# Patient Record
Sex: MALE | Race: WHITE | NOT HISPANIC OR LATINO
[De-identification: names, ages, dates, MRNs, and addresses within clinical notes are randomized per-mention and may not be internally consistent; named-entity substitution may affect disease eponyms.]

---

## 2019-07-15 ENCOUNTER — APPOINTMENT (OUTPATIENT)
Dept: FAMILY MEDICINE | Facility: CLINIC | Age: 69
End: 2019-07-15
Payer: MEDICARE

## 2019-07-15 ENCOUNTER — RECORD ABSTRACTING (OUTPATIENT)
Age: 69
End: 2019-07-15

## 2019-07-15 VITALS
BODY MASS INDEX: 28.98 KG/M2 | WEIGHT: 202.4 LBS | HEART RATE: 59 BPM | HEIGHT: 70 IN | TEMPERATURE: 98.8 F | OXYGEN SATURATION: 97 %

## 2019-07-15 VITALS — HEART RATE: 75 BPM | DIASTOLIC BLOOD PRESSURE: 75 MMHG | SYSTOLIC BLOOD PRESSURE: 135 MMHG

## 2019-07-15 DIAGNOSIS — Z87.19 PERSONAL HISTORY OF OTHER DISEASES OF THE DIGESTIVE SYSTEM: ICD-10-CM

## 2019-07-15 DIAGNOSIS — Z83.3 FAMILY HISTORY OF DIABETES MELLITUS: ICD-10-CM

## 2019-07-15 DIAGNOSIS — Z78.9 OTHER SPECIFIED HEALTH STATUS: ICD-10-CM

## 2019-07-15 DIAGNOSIS — Z82.49 FAMILY HISTORY OF ISCHEMIC HEART DISEASE AND OTHER DISEASES OF THE CIRCULATORY SYSTEM: ICD-10-CM

## 2019-07-15 PROCEDURE — 99214 OFFICE O/P EST MOD 30 MIN: CPT | Mod: 25

## 2019-07-15 PROCEDURE — 36415 COLL VENOUS BLD VENIPUNCTURE: CPT

## 2019-07-15 RX ORDER — ASPIRIN 81 MG
81 TABLET, DELAYED RELEASE (ENTERIC COATED) ORAL
Refills: 0 | Status: ACTIVE | COMMUNITY

## 2019-07-16 LAB
ALBUMIN SERPL ELPH-MCNC: 4.9 G/DL
ALP BLD-CCNC: 37 U/L
ALT SERPL-CCNC: 49 U/L
ANION GAP SERPL CALC-SCNC: 13 MMOL/L
AST SERPL-CCNC: 33 U/L
BASOPHILS # BLD AUTO: 0.05 K/UL
BASOPHILS NFR BLD AUTO: 0.6 %
BILIRUB SERPL-MCNC: 0.6 MG/DL
BUN SERPL-MCNC: 30 MG/DL
CALCIUM SERPL-MCNC: 10.6 MG/DL
CALCIUM SERPL-MCNC: 10.7 MG/DL
CHLORIDE SERPL-SCNC: 105 MMOL/L
CHOLEST SERPL-MCNC: 188 MG/DL
CHOLEST/HDLC SERPL: 4.5 RATIO
CO2 SERPL-SCNC: 23 MMOL/L
CREAT SERPL-MCNC: 1.93 MG/DL
EOSINOPHIL # BLD AUTO: 0.26 K/UL
EOSINOPHIL NFR BLD AUTO: 3.3 %
ESTIMATED AVERAGE GLUCOSE: 126 MG/DL
GLUCOSE SERPL-MCNC: 148 MG/DL
HBA1C MFR BLD HPLC: 6 %
HCT VFR BLD CALC: 49.4 %
HDLC SERPL-MCNC: 42 MG/DL
HGB BLD-MCNC: 15.9 G/DL
IMM GRANULOCYTES NFR BLD AUTO: 0.4 %
LDLC SERPL CALC-MCNC: 121 MG/DL
LYMPHOCYTES # BLD AUTO: 1.69 K/UL
LYMPHOCYTES NFR BLD AUTO: 21.6 %
MAGNESIUM SERPL-MCNC: 1.9 MG/DL
MAN DIFF?: NORMAL
MCHC RBC-ENTMCNC: 29.9 PG
MCHC RBC-ENTMCNC: 32.2 GM/DL
MCV RBC AUTO: 93 FL
MONOCYTES # BLD AUTO: 0.32 K/UL
MONOCYTES NFR BLD AUTO: 4.1 %
NEUTROPHILS # BLD AUTO: 5.48 K/UL
NEUTROPHILS NFR BLD AUTO: 70 %
PARATHYROID HORMONE INTACT: 26 PG/ML
PLATELET # BLD AUTO: 153 K/UL
POTASSIUM SERPL-SCNC: 3.9 MMOL/L
PROT SERPL-MCNC: 7.3 G/DL
PSA SERPL-MCNC: 3.49 NG/ML
RBC # BLD: 5.31 M/UL
RBC # FLD: 13.8 %
SODIUM SERPL-SCNC: 141 MMOL/L
T4 FREE SERPL-MCNC: 1.1 NG/DL
TRIGL SERPL-MCNC: 127 MG/DL
TSH SERPL-ACNC: 1.27 UIU/ML
WBC # FLD AUTO: 7.83 K/UL

## 2019-08-08 ENCOUNTER — APPOINTMENT (OUTPATIENT)
Dept: FAMILY MEDICINE | Facility: CLINIC | Age: 69
End: 2019-08-08
Payer: MEDICARE

## 2019-08-08 VITALS — SYSTOLIC BLOOD PRESSURE: 126 MMHG | DIASTOLIC BLOOD PRESSURE: 84 MMHG | HEART RATE: 72 BPM

## 2019-08-08 VITALS
HEIGHT: 70 IN | BODY MASS INDEX: 28.92 KG/M2 | WEIGHT: 202 LBS | OXYGEN SATURATION: 96 % | HEART RATE: 57 BPM | TEMPERATURE: 98.8 F

## 2019-08-08 PROCEDURE — 36415 COLL VENOUS BLD VENIPUNCTURE: CPT

## 2019-08-08 PROCEDURE — 99213 OFFICE O/P EST LOW 20 MIN: CPT | Mod: 25

## 2019-08-08 NOTE — PHYSICAL EXAM
[No Acute Distress] : no acute distress [Well Nourished] : well nourished [Well Developed] : well developed [Well-Appearing] : well-appearing [Normal Sclera/Conjunctiva] : normal sclera/conjunctiva [EOMI] : extraocular movements intact [PERRL] : pupils equal round and reactive to light [Normal Outer Ear/Nose] : the outer ears and nose were normal in appearance [Normal Oropharynx] : the oropharynx was normal [No JVD] : no jugular venous distention [No Lymphadenopathy] : no lymphadenopathy [Supple] : supple [Thyroid Normal, No Nodules] : the thyroid was normal and there were no nodules present [No Respiratory Distress] : no respiratory distress  [No Accessory Muscle Use] : no accessory muscle use [Clear to Auscultation] : lungs were clear to auscultation bilaterally [Normal Rate] : normal rate  [Regular Rhythm] : with a regular rhythm [Normal S1, S2] : normal S1 and S2 [No Murmur] : no murmur heard [No Carotid Bruits] : no carotid bruits [No Abdominal Bruit] : a ~M bruit was not heard ~T in the abdomen [No Varicosities] : no varicosities [Pedal Pulses Present] : the pedal pulses are present [No Edema] : there was no peripheral edema [No Palpable Aorta] : no palpable aorta [No Extremity Clubbing/Cyanosis] : no extremity clubbing/cyanosis [Soft] : abdomen soft [Non Tender] : non-tender [Non-distended] : non-distended [No Masses] : no abdominal mass palpated [Normal Bowel Sounds] : normal bowel sounds [No HSM] : no HSM [Normal Posterior Cervical Nodes] : no posterior cervical lymphadenopathy [Normal Anterior Cervical Nodes] : no anterior cervical lymphadenopathy [No CVA Tenderness] : no CVA  tenderness [No Spinal Tenderness] : no spinal tenderness [Grossly Normal Strength/Tone] : grossly normal strength/tone [No Joint Swelling] : no joint swelling [No Rash] : no rash [Coordination Grossly Intact] : coordination grossly intact [No Focal Deficits] : no focal deficits [Normal Gait] : normal gait [Deep Tendon Reflexes (DTR)] : deep tendon reflexes were 2+ and symmetric [Normal Affect] : the affect was normal [Normal Insight/Judgement] : insight and judgment were intact

## 2019-08-09 LAB
ALBUMIN SERPL ELPH-MCNC: 4.6 G/DL
ANION GAP SERPL CALC-SCNC: 14 MMOL/L
BUN SERPL-MCNC: 22 MG/DL
CALCIUM SERPL-MCNC: 10.5 MG/DL
CHLORIDE SERPL-SCNC: 102 MMOL/L
CO2 SERPL-SCNC: 26 MMOL/L
CREAT SERPL-MCNC: 1.45 MG/DL
GLUCOSE SERPL-MCNC: 102 MG/DL
PHOSPHATE SERPL-MCNC: 3.2 MG/DL
POTASSIUM SERPL-SCNC: 4.2 MMOL/L
SODIUM SERPL-SCNC: 142 MMOL/L

## 2019-08-09 RX ORDER — LOSARTAN POTASSIUM AND HYDROCHLOROTHIAZIDE 12.5; 1 MG/1; MG/1
100-12.5 TABLET ORAL DAILY
Qty: 90 | Refills: 3 | Status: ACTIVE | COMMUNITY
Start: 2019-08-09 | End: 1900-01-01

## 2019-08-14 ENCOUNTER — APPOINTMENT (OUTPATIENT)
Dept: FAMILY MEDICINE | Facility: CLINIC | Age: 69
End: 2019-08-14
Payer: MEDICARE

## 2019-08-14 VITALS
HEART RATE: 74 BPM | TEMPERATURE: 98.5 F | HEIGHT: 70 IN | WEIGHT: 20 LBS | OXYGEN SATURATION: 97 % | BODY MASS INDEX: 2.86 KG/M2

## 2019-08-14 VITALS — SYSTOLIC BLOOD PRESSURE: 138 MMHG | HEART RATE: 76 BPM | DIASTOLIC BLOOD PRESSURE: 86 MMHG

## 2019-08-14 PROCEDURE — 99213 OFFICE O/P EST LOW 20 MIN: CPT

## 2019-08-14 NOTE — PHYSICAL EXAM
[No Acute Distress] : no acute distress [Well Developed] : well developed [Well Nourished] : well nourished [Well-Appearing] : well-appearing [PERRL] : pupils equal round and reactive to light [Normal Sclera/Conjunctiva] : normal sclera/conjunctiva [EOMI] : extraocular movements intact [Normal Oropharynx] : the oropharynx was normal [Normal Outer Ear/Nose] : the outer ears and nose were normal in appearance [No Lymphadenopathy] : no lymphadenopathy [No JVD] : no jugular venous distention [Supple] : supple [Thyroid Normal, No Nodules] : the thyroid was normal and there were no nodules present [No Respiratory Distress] : no respiratory distress  [No Accessory Muscle Use] : no accessory muscle use [Clear to Auscultation] : lungs were clear to auscultation bilaterally [Regular Rhythm] : with a regular rhythm [Normal Rate] : normal rate  [Normal S1, S2] : normal S1 and S2 [No Murmur] : no murmur heard [No Carotid Bruits] : no carotid bruits [No Abdominal Bruit] : a ~M bruit was not heard ~T in the abdomen [No Varicosities] : no varicosities [Pedal Pulses Present] : the pedal pulses are present [No Edema] : there was no peripheral edema [No Extremity Clubbing/Cyanosis] : no extremity clubbing/cyanosis [No Palpable Aorta] : no palpable aorta [Non Tender] : non-tender [Soft] : abdomen soft [Non-distended] : non-distended [No HSM] : no HSM [No Masses] : no abdominal mass palpated [Normal Bowel Sounds] : normal bowel sounds [Normal Anterior Cervical Nodes] : no anterior cervical lymphadenopathy [Normal Posterior Cervical Nodes] : no posterior cervical lymphadenopathy [No CVA Tenderness] : no CVA  tenderness [No Spinal Tenderness] : no spinal tenderness [Grossly Normal Strength/Tone] : grossly normal strength/tone [No Joint Swelling] : no joint swelling [No Rash] : no rash [No Focal Deficits] : no focal deficits [Coordination Grossly Intact] : coordination grossly intact [Normal Gait] : normal gait [Normal Insight/Judgement] : insight and judgment were intact [Deep Tendon Reflexes (DTR)] : deep tendon reflexes were 2+ and symmetric [Normal Affect] : the affect was normal

## 2019-08-16 RX ORDER — LOSARTAN POTASSIUM AND HYDROCHLOROTHIAZIDE 12.5; 1 MG/1; MG/1
100-12.5 TABLET ORAL
Qty: 30 | Refills: 0 | Status: DISCONTINUED | COMMUNITY
Start: 2019-07-15 | End: 2019-08-16

## 2019-08-27 ENCOUNTER — APPOINTMENT (OUTPATIENT)
Dept: FAMILY MEDICINE | Facility: CLINIC | Age: 69
End: 2019-08-27

## 2019-08-28 ENCOUNTER — APPOINTMENT (OUTPATIENT)
Dept: FAMILY MEDICINE | Facility: CLINIC | Age: 69
End: 2019-08-28

## 2019-09-04 ENCOUNTER — APPOINTMENT (OUTPATIENT)
Dept: FAMILY MEDICINE | Facility: CLINIC | Age: 69
End: 2019-09-04
Payer: MEDICARE

## 2019-09-04 VITALS — SYSTOLIC BLOOD PRESSURE: 145 MMHG | HEART RATE: 64 BPM | DIASTOLIC BLOOD PRESSURE: 90 MMHG

## 2019-09-04 VITALS
HEART RATE: 78 BPM | BODY MASS INDEX: 29.78 KG/M2 | HEIGHT: 70 IN | OXYGEN SATURATION: 98 % | TEMPERATURE: 98.8 F | WEIGHT: 208 LBS

## 2019-09-04 PROCEDURE — 36415 COLL VENOUS BLD VENIPUNCTURE: CPT

## 2019-09-04 PROCEDURE — 99213 OFFICE O/P EST LOW 20 MIN: CPT | Mod: 25

## 2019-09-05 ENCOUNTER — RX RENEWAL (OUTPATIENT)
Age: 69
End: 2019-09-05

## 2019-09-05 LAB
ALBUMIN SERPL ELPH-MCNC: 4.6 G/DL
ANION GAP SERPL CALC-SCNC: 14 MMOL/L
BUN SERPL-MCNC: 22 MG/DL
CALCIUM SERPL-MCNC: 10 MG/DL
CHLORIDE SERPL-SCNC: 105 MMOL/L
CO2 SERPL-SCNC: 23 MMOL/L
CREAT SERPL-MCNC: 1.38 MG/DL
GLUCOSE SERPL-MCNC: 95 MG/DL
PHOSPHATE SERPL-MCNC: 3.1 MG/DL
POTASSIUM SERPL-SCNC: 4.5 MMOL/L
SODIUM SERPL-SCNC: 142 MMOL/L

## 2019-09-18 ENCOUNTER — RX RENEWAL (OUTPATIENT)
Age: 69
End: 2019-09-18

## 2019-09-18 ENCOUNTER — APPOINTMENT (OUTPATIENT)
Dept: FAMILY MEDICINE | Facility: CLINIC | Age: 69
End: 2019-09-18
Payer: MEDICARE

## 2019-09-18 VITALS — DIASTOLIC BLOOD PRESSURE: 80 MMHG | SYSTOLIC BLOOD PRESSURE: 140 MMHG | HEART RATE: 72 BPM

## 2019-09-18 VITALS — TEMPERATURE: 98.3 F | HEART RATE: 79 BPM | OXYGEN SATURATION: 97 %

## 2019-09-18 PROCEDURE — 99213 OFFICE O/P EST LOW 20 MIN: CPT

## 2019-09-18 NOTE — PHYSICAL EXAM
[No Acute Distress] : no acute distress [Well Nourished] : well nourished [Well Developed] : well developed [Well-Appearing] : well-appearing [Normal Sclera/Conjunctiva] : normal sclera/conjunctiva [PERRL] : pupils equal round and reactive to light [EOMI] : extraocular movements intact [Normal Outer Ear/Nose] : the outer ears and nose were normal in appearance [Normal Oropharynx] : the oropharynx was normal [No JVD] : no jugular venous distention [No Lymphadenopathy] : no lymphadenopathy [Supple] : supple [Thyroid Normal, No Nodules] : the thyroid was normal and there were no nodules present [No Respiratory Distress] : no respiratory distress  [No Accessory Muscle Use] : no accessory muscle use [Clear to Auscultation] : lungs were clear to auscultation bilaterally [Normal Rate] : normal rate  [Regular Rhythm] : with a regular rhythm [Normal S1, S2] : normal S1 and S2 [No Murmur] : no murmur heard [No Carotid Bruits] : no carotid bruits [No Abdominal Bruit] : a ~M bruit was not heard ~T in the abdomen [No Varicosities] : no varicosities [Pedal Pulses Present] : the pedal pulses are present [No Edema] : there was no peripheral edema [No Palpable Aorta] : no palpable aorta [No Extremity Clubbing/Cyanosis] : no extremity clubbing/cyanosis [Soft] : abdomen soft [Non Tender] : non-tender [No Masses] : no abdominal mass palpated [Non-distended] : non-distended [No HSM] : no HSM [Normal Bowel Sounds] : normal bowel sounds [Normal Posterior Cervical Nodes] : no posterior cervical lymphadenopathy [Normal Anterior Cervical Nodes] : no anterior cervical lymphadenopathy [No CVA Tenderness] : no CVA  tenderness [No Spinal Tenderness] : no spinal tenderness [No Joint Swelling] : no joint swelling [Grossly Normal Strength/Tone] : grossly normal strength/tone [Coordination Grossly Intact] : coordination grossly intact [No Rash] : no rash [No Focal Deficits] : no focal deficits [Normal Gait] : normal gait [Deep Tendon Reflexes (DTR)] : deep tendon reflexes were 2+ and symmetric [Normal Affect] : the affect was normal [Normal Insight/Judgement] : insight and judgment were intact

## 2019-09-19 ENCOUNTER — APPOINTMENT (OUTPATIENT)
Dept: FAMILY MEDICINE | Facility: CLINIC | Age: 69
End: 2019-09-19
Payer: MEDICARE

## 2019-09-19 VITALS — TEMPERATURE: 98.4 F | HEART RATE: 84 BPM | OXYGEN SATURATION: 98 %

## 2019-09-19 PROCEDURE — 99213 OFFICE O/P EST LOW 20 MIN: CPT

## 2019-09-19 NOTE — HISTORY OF PRESENT ILLNESS
[FreeTextEntry1] : pt here for recheck [de-identified] : pt starting to take overmeds water pills more hydralazine more losartin

## 2019-09-25 ENCOUNTER — APPOINTMENT (OUTPATIENT)
Dept: FAMILY MEDICINE | Facility: CLINIC | Age: 69
End: 2019-09-25

## 2019-10-03 ENCOUNTER — MEDICATION RENEWAL (OUTPATIENT)
Age: 69
End: 2019-10-03

## 2019-10-03 ENCOUNTER — APPOINTMENT (OUTPATIENT)
Dept: FAMILY MEDICINE | Facility: CLINIC | Age: 69
End: 2019-10-03
Payer: MEDICARE

## 2019-10-03 VITALS — OXYGEN SATURATION: 97 % | TEMPERATURE: 98.5 F | HEART RATE: 71 BPM

## 2019-10-03 VITALS — HEART RATE: 72 BPM | SYSTOLIC BLOOD PRESSURE: 135 MMHG | DIASTOLIC BLOOD PRESSURE: 82 MMHG

## 2019-10-03 PROCEDURE — 99213 OFFICE O/P EST LOW 20 MIN: CPT

## 2019-10-03 RX ORDER — ICOSAPENT ETHYL 1000 MG/1
1 CAPSULE ORAL
Qty: 120 | Refills: 3 | Status: ACTIVE | COMMUNITY
Start: 2019-10-03 | End: 1900-01-01

## 2019-10-03 NOTE — PHYSICAL EXAM
[No Acute Distress] : no acute distress [Well Nourished] : well nourished [Well Developed] : well developed [Well-Appearing] : well-appearing [Normal Sclera/Conjunctiva] : normal sclera/conjunctiva [PERRL] : pupils equal round and reactive to light [EOMI] : extraocular movements intact [Normal Outer Ear/Nose] : the outer ears and nose were normal in appearance [Normal Oropharynx] : the oropharynx was normal [No JVD] : no jugular venous distention [No Lymphadenopathy] : no lymphadenopathy [Supple] : supple [Thyroid Normal, No Nodules] : the thyroid was normal and there were no nodules present [No Respiratory Distress] : no respiratory distress  [No Accessory Muscle Use] : no accessory muscle use [Clear to Auscultation] : lungs were clear to auscultation bilaterally [Normal Rate] : normal rate  [Regular Rhythm] : with a regular rhythm [Normal S1, S2] : normal S1 and S2 [No Murmur] : no murmur heard [No Carotid Bruits] : no carotid bruits [No Abdominal Bruit] : a ~M bruit was not heard ~T in the abdomen [No Varicosities] : no varicosities [Pedal Pulses Present] : the pedal pulses are present [No Edema] : there was no peripheral edema [No Palpable Aorta] : no palpable aorta [No Extremity Clubbing/Cyanosis] : no extremity clubbing/cyanosis [Soft] : abdomen soft [Non Tender] : non-tender [Non-distended] : non-distended [No Masses] : no abdominal mass palpated [No HSM] : no HSM [Normal Bowel Sounds] : normal bowel sounds [Normal Posterior Cervical Nodes] : no posterior cervical lymphadenopathy [Normal Anterior Cervical Nodes] : no anterior cervical lymphadenopathy [No CVA Tenderness] : no CVA  tenderness [No Spinal Tenderness] : no spinal tenderness [Grossly Normal Strength/Tone] : grossly normal strength/tone [No Joint Swelling] : no joint swelling [No Rash] : no rash [Coordination Grossly Intact] : coordination grossly intact [No Focal Deficits] : no focal deficits [Normal Gait] : normal gait [Deep Tendon Reflexes (DTR)] : deep tendon reflexes were 2+ and symmetric [Normal Affect] : the affect was normal [Normal Insight/Judgement] : insight and judgment were intact

## 2019-10-30 ENCOUNTER — APPOINTMENT (OUTPATIENT)
Dept: FAMILY MEDICINE | Facility: CLINIC | Age: 69
End: 2019-10-30
Payer: MEDICARE

## 2019-10-30 VITALS — OXYGEN SATURATION: 97 % | HEART RATE: 66 BPM | BODY MASS INDEX: 29.87 KG/M2 | WEIGHT: 208.2 LBS

## 2019-10-30 VITALS — HEART RATE: 72 BPM | DIASTOLIC BLOOD PRESSURE: 92 MMHG | SYSTOLIC BLOOD PRESSURE: 140 MMHG

## 2019-10-30 PROCEDURE — G0008: CPT

## 2019-10-30 PROCEDURE — 99213 OFFICE O/P EST LOW 20 MIN: CPT | Mod: 25

## 2019-10-30 PROCEDURE — 90662 IIV NO PRSV INCREASED AG IM: CPT

## 2019-10-30 NOTE — HISTORY OF PRESENT ILLNESS
[FreeTextEntry1] : recheck on his b/p [de-identified] : saw cardiology took pt off hydralazine and put on bystolic

## 2020-01-14 ENCOUNTER — APPOINTMENT (OUTPATIENT)
Dept: FAMILY MEDICINE | Facility: CLINIC | Age: 70
End: 2020-01-14
Payer: MEDICARE

## 2020-01-14 VITALS — HEART RATE: 67 BPM | TEMPERATURE: 98.2 F | BODY MASS INDEX: 29.59 KG/M2 | WEIGHT: 206.2 LBS | OXYGEN SATURATION: 97 %

## 2020-01-14 VITALS — SYSTOLIC BLOOD PRESSURE: 120 MMHG | DIASTOLIC BLOOD PRESSURE: 82 MMHG | HEART RATE: 72 BPM

## 2020-01-14 PROCEDURE — 99213 OFFICE O/P EST LOW 20 MIN: CPT

## 2020-01-14 NOTE — PHYSICAL EXAM
[No Acute Distress] : no acute distress [Well Developed] : well developed [Well Nourished] : well nourished [Normal Sclera/Conjunctiva] : normal sclera/conjunctiva [Well-Appearing] : well-appearing [PERRL] : pupils equal round and reactive to light [EOMI] : extraocular movements intact [Normal Outer Ear/Nose] : the outer ears and nose were normal in appearance [Normal Oropharynx] : the oropharynx was normal [No JVD] : no jugular venous distention [No Lymphadenopathy] : no lymphadenopathy [Supple] : supple [Thyroid Normal, No Nodules] : the thyroid was normal and there were no nodules present [No Accessory Muscle Use] : no accessory muscle use [No Respiratory Distress] : no respiratory distress  [Regular Rhythm] : with a regular rhythm [Normal Rate] : normal rate  [Clear to Auscultation] : lungs were clear to auscultation bilaterally [No Murmur] : no murmur heard [Normal S1, S2] : normal S1 and S2 [No Carotid Bruits] : no carotid bruits [Pedal Pulses Present] : the pedal pulses are present [No Abdominal Bruit] : a ~M bruit was not heard ~T in the abdomen [No Varicosities] : no varicosities [No Edema] : there was no peripheral edema [No Palpable Aorta] : no palpable aorta [Soft] : abdomen soft [No Extremity Clubbing/Cyanosis] : no extremity clubbing/cyanosis [Non-distended] : non-distended [Non Tender] : non-tender [No HSM] : no HSM [No Masses] : no abdominal mass palpated [Normal Posterior Cervical Nodes] : no posterior cervical lymphadenopathy [Normal Anterior Cervical Nodes] : no anterior cervical lymphadenopathy [Normal Bowel Sounds] : normal bowel sounds [No CVA Tenderness] : no CVA  tenderness [Grossly Normal Strength/Tone] : grossly normal strength/tone [No Joint Swelling] : no joint swelling [No Spinal Tenderness] : no spinal tenderness [Coordination Grossly Intact] : coordination grossly intact [No Rash] : no rash [No Focal Deficits] : no focal deficits [Normal Gait] : normal gait [Deep Tendon Reflexes (DTR)] : deep tendon reflexes were 2+ and symmetric [Normal Affect] : the affect was normal [Normal Insight/Judgement] : insight and judgment were intact

## 2020-05-11 ENCOUNTER — APPOINTMENT (OUTPATIENT)
Dept: FAMILY MEDICINE | Facility: CLINIC | Age: 70
End: 2020-05-11
Payer: MEDICARE

## 2020-05-11 PROCEDURE — 99213 OFFICE O/P EST LOW 20 MIN: CPT

## 2020-08-10 ENCOUNTER — APPOINTMENT (OUTPATIENT)
Dept: FAMILY MEDICINE | Facility: CLINIC | Age: 70
End: 2020-08-10
Payer: MEDICARE

## 2020-08-10 VITALS — HEART RATE: 54 BPM | TEMPERATURE: 98.3 F | OXYGEN SATURATION: 97 %

## 2020-08-10 VITALS — DIASTOLIC BLOOD PRESSURE: 84 MMHG | SYSTOLIC BLOOD PRESSURE: 135 MMHG | HEART RATE: 60 BPM

## 2020-08-10 LAB
BILIRUB UR QL STRIP: NORMAL
GLUCOSE UR-MCNC: NORMAL
HCG UR QL: 0.2 EU/DL
HGB UR QL STRIP.AUTO: NORMAL
KETONES UR-MCNC: NORMAL
LEUKOCYTE ESTERASE UR QL STRIP: NORMAL
NITRITE UR QL STRIP: NORMAL
PH UR STRIP: 5.5
PROT UR STRIP-MCNC: NORMAL
SP GR UR STRIP: 1.02

## 2020-08-10 PROCEDURE — 99213 OFFICE O/P EST LOW 20 MIN: CPT | Mod: 25

## 2020-08-10 PROCEDURE — 81003 URINALYSIS AUTO W/O SCOPE: CPT | Mod: QW

## 2020-08-10 PROCEDURE — 36415 COLL VENOUS BLD VENIPUNCTURE: CPT

## 2020-08-10 NOTE — PHYSICAL EXAM
[Well Nourished] : well nourished [No Acute Distress] : no acute distress [Well Developed] : well developed [Well-Appearing] : well-appearing [PERRL] : pupils equal round and reactive to light [Normal Sclera/Conjunctiva] : normal sclera/conjunctiva [EOMI] : extraocular movements intact [Normal Outer Ear/Nose] : the outer ears and nose were normal in appearance [Normal Oropharynx] : the oropharynx was normal [No JVD] : no jugular venous distention [No Lymphadenopathy] : no lymphadenopathy [Supple] : supple [Thyroid Normal, No Nodules] : the thyroid was normal and there were no nodules present [No Respiratory Distress] : no respiratory distress  [Clear to Auscultation] : lungs were clear to auscultation bilaterally [No Accessory Muscle Use] : no accessory muscle use [Regular Rhythm] : with a regular rhythm [Normal Rate] : normal rate  [Normal S1, S2] : normal S1 and S2 [No Murmur] : no murmur heard [No Carotid Bruits] : no carotid bruits [No Abdominal Bruit] : a ~M bruit was not heard ~T in the abdomen [No Varicosities] : no varicosities [Pedal Pulses Present] : the pedal pulses are present [No Edema] : there was no peripheral edema [No Palpable Aorta] : no palpable aorta [Soft] : abdomen soft [No Extremity Clubbing/Cyanosis] : no extremity clubbing/cyanosis [Non-distended] : non-distended [Non Tender] : non-tender [No HSM] : no HSM [No Masses] : no abdominal mass palpated [Normal Posterior Cervical Nodes] : no posterior cervical lymphadenopathy [Normal Bowel Sounds] : normal bowel sounds [Normal Anterior Cervical Nodes] : no anterior cervical lymphadenopathy [No CVA Tenderness] : no CVA  tenderness [No Spinal Tenderness] : no spinal tenderness [No Joint Swelling] : no joint swelling [Grossly Normal Strength/Tone] : grossly normal strength/tone [No Rash] : no rash [Coordination Grossly Intact] : coordination grossly intact [Normal Gait] : normal gait [No Focal Deficits] : no focal deficits [Normal Insight/Judgement] : insight and judgment were intact [Normal Affect] : the affect was normal

## 2020-08-11 LAB
ALBUMIN SERPL ELPH-MCNC: 4.8 G/DL
ALP BLD-CCNC: 89 U/L
ALT SERPL-CCNC: 31 U/L
ANION GAP SERPL CALC-SCNC: 15 MMOL/L
AST SERPL-CCNC: 20 U/L
BASOPHILS # BLD AUTO: 0.05 K/UL
BASOPHILS NFR BLD AUTO: 0.7 %
BILIRUB SERPL-MCNC: 0.6 MG/DL
BUN SERPL-MCNC: 20 MG/DL
CALCIUM SERPL-MCNC: 10 MG/DL
CHLORIDE SERPL-SCNC: 104 MMOL/L
CHOLEST SERPL-MCNC: 162 MG/DL
CHOLEST/HDLC SERPL: 4.7 RATIO
CO2 SERPL-SCNC: 22 MMOL/L
CREAT SERPL-MCNC: 1.32 MG/DL
EOSINOPHIL # BLD AUTO: 0.36 K/UL
EOSINOPHIL NFR BLD AUTO: 5.2 %
ESTIMATED AVERAGE GLUCOSE: 120 MG/DL
GLUCOSE SERPL-MCNC: 114 MG/DL
HBA1C MFR BLD HPLC: 5.8 %
HCT VFR BLD CALC: 47.5 %
HDLC SERPL-MCNC: 34 MG/DL
HGB BLD-MCNC: 15 G/DL
IMM GRANULOCYTES NFR BLD AUTO: 0.4 %
LDLC SERPL CALC-MCNC: 80 MG/DL
LYMPHOCYTES # BLD AUTO: 2.11 K/UL
LYMPHOCYTES NFR BLD AUTO: 30.7 %
MAN DIFF?: NORMAL
MCHC RBC-ENTMCNC: 30.7 PG
MCHC RBC-ENTMCNC: 31.6 GM/DL
MCV RBC AUTO: 97.1 FL
MONOCYTES # BLD AUTO: 0.4 K/UL
MONOCYTES NFR BLD AUTO: 5.8 %
NEUTROPHILS # BLD AUTO: 3.93 K/UL
NEUTROPHILS NFR BLD AUTO: 57.2 %
PLATELET # BLD AUTO: 124 K/UL
POTASSIUM SERPL-SCNC: 4.3 MMOL/L
PROT SERPL-MCNC: 7 G/DL
PSA SERPL-MCNC: 3.56 NG/ML
RBC # BLD: 4.89 M/UL
RBC # FLD: 13.1 %
SODIUM SERPL-SCNC: 140 MMOL/L
T4 FREE SERPL-MCNC: 1 NG/DL
TRIGL SERPL-MCNC: 237 MG/DL
TSH SERPL-ACNC: 1.64 UIU/ML
WBC # FLD AUTO: 6.88 K/UL

## 2020-08-31 ENCOUNTER — RX RENEWAL (OUTPATIENT)
Age: 70
End: 2020-08-31

## 2020-10-05 ENCOUNTER — RX RENEWAL (OUTPATIENT)
Age: 70
End: 2020-10-05

## 2021-09-29 ENCOUNTER — APPOINTMENT (OUTPATIENT)
Dept: FAMILY MEDICINE | Facility: CLINIC | Age: 71
End: 2021-09-29
Payer: MEDICARE

## 2021-09-29 VITALS
BODY MASS INDEX: 29.2 KG/M2 | HEART RATE: 55 BPM | OXYGEN SATURATION: 96 % | WEIGHT: 204 LBS | TEMPERATURE: 97.8 F | HEIGHT: 70 IN

## 2021-09-29 VITALS — HEART RATE: 60 BPM | SYSTOLIC BLOOD PRESSURE: 125 MMHG | DIASTOLIC BLOOD PRESSURE: 82 MMHG

## 2021-09-29 DIAGNOSIS — N28.9 DISORDER OF KIDNEY AND URETER, UNSPECIFIED: ICD-10-CM

## 2021-09-29 LAB
BILIRUB UR QL STRIP: NORMAL
GLUCOSE UR-MCNC: NORMAL
HCG UR QL: 0.2 EU/DL
HGB UR QL STRIP.AUTO: NORMAL
KETONES UR-MCNC: NORMAL
LEUKOCYTE ESTERASE UR QL STRIP: NORMAL
NITRITE UR QL STRIP: NORMAL
PH UR STRIP: 5.5
PROT UR STRIP-MCNC: NORMAL
SP GR UR STRIP: 1.03

## 2021-09-29 PROCEDURE — G0438: CPT

## 2021-09-29 PROCEDURE — 36415 COLL VENOUS BLD VENIPUNCTURE: CPT

## 2021-09-29 PROCEDURE — 81003 URINALYSIS AUTO W/O SCOPE: CPT | Mod: QW

## 2021-09-30 LAB
ALBUMIN SERPL ELPH-MCNC: 5 G/DL
ALP BLD-CCNC: 88 U/L
ALT SERPL-CCNC: 54 U/L
ANION GAP SERPL CALC-SCNC: 14 MMOL/L
AST SERPL-CCNC: 30 U/L
BASOPHILS # BLD AUTO: 0.06 K/UL
BASOPHILS NFR BLD AUTO: 0.8 %
BILIRUB SERPL-MCNC: 0.7 MG/DL
BUN SERPL-MCNC: 26 MG/DL
CALCIUM SERPL-MCNC: 10.3 MG/DL
CHLORIDE SERPL-SCNC: 101 MMOL/L
CHOLEST SERPL-MCNC: 166 MG/DL
CO2 SERPL-SCNC: 26 MMOL/L
CREAT SERPL-MCNC: 1.44 MG/DL
EOSINOPHIL # BLD AUTO: 0.27 K/UL
EOSINOPHIL NFR BLD AUTO: 3.7 %
ESTIMATED AVERAGE GLUCOSE: 126 MG/DL
GLUCOSE SERPL-MCNC: 98 MG/DL
HBA1C MFR BLD HPLC: 6 %
HCT VFR BLD CALC: 48.4 %
HDLC SERPL-MCNC: 30 MG/DL
HGB BLD-MCNC: 15.8 G/DL
IMM GRANULOCYTES NFR BLD AUTO: 0.4 %
LDLC SERPL CALC-MCNC: 73 MG/DL
LYMPHOCYTES # BLD AUTO: 2.2 K/UL
LYMPHOCYTES NFR BLD AUTO: 29.8 %
MAN DIFF?: NORMAL
MCHC RBC-ENTMCNC: 30.7 PG
MCHC RBC-ENTMCNC: 32.6 GM/DL
MCV RBC AUTO: 94 FL
MONOCYTES # BLD AUTO: 0.46 K/UL
MONOCYTES NFR BLD AUTO: 6.2 %
NEUTROPHILS # BLD AUTO: 4.37 K/UL
NEUTROPHILS NFR BLD AUTO: 59.1 %
NONHDLC SERPL-MCNC: 136 MG/DL
PLATELET # BLD AUTO: 139 K/UL
POTASSIUM SERPL-SCNC: 4.3 MMOL/L
PROT SERPL-MCNC: 7.4 G/DL
PSA SERPL-MCNC: 3.97 NG/ML
RBC # BLD: 5.15 M/UL
RBC # FLD: 13.8 %
SODIUM SERPL-SCNC: 140 MMOL/L
T4 FREE SERPL-MCNC: 1.1 NG/DL
TRIGL SERPL-MCNC: 316 MG/DL
TSH SERPL-ACNC: 2.65 UIU/ML
WBC # FLD AUTO: 7.39 K/UL

## 2021-10-05 ENCOUNTER — NON-APPOINTMENT (OUTPATIENT)
Age: 71
End: 2021-10-05

## 2022-04-13 ENCOUNTER — APPOINTMENT (OUTPATIENT)
Dept: FAMILY MEDICINE | Facility: CLINIC | Age: 72
End: 2022-04-13
Payer: MEDICARE

## 2022-04-13 VITALS
BODY MASS INDEX: 29.63 KG/M2 | HEIGHT: 70 IN | OXYGEN SATURATION: 98 % | WEIGHT: 207 LBS | HEART RATE: 57 BPM | TEMPERATURE: 98 F

## 2022-04-13 DIAGNOSIS — R29.898 OTHER SYMPTOMS AND SIGNS INVOLVING THE MUSCULOSKELETAL SYSTEM: ICD-10-CM

## 2022-04-13 PROCEDURE — 99214 OFFICE O/P EST MOD 30 MIN: CPT

## 2022-04-19 NOTE — HISTORY OF PRESENT ILLNESS
[FreeTextEntry1] : pt is here because he has weakness in his right arm. [de-identified] : weakness in right arm mostly right thumb

## 2022-07-06 ENCOUNTER — NON-APPOINTMENT (OUTPATIENT)
Age: 72
End: 2022-07-06

## 2022-07-08 ENCOUNTER — TRANSCRIPTION ENCOUNTER (OUTPATIENT)
Age: 72
End: 2022-07-08

## 2022-07-10 ENCOUNTER — OUTPATIENT (OUTPATIENT)
Dept: OUTPATIENT SERVICES | Facility: HOSPITAL | Age: 72
LOS: 1 days | End: 2022-07-10

## 2022-07-10 ENCOUNTER — APPOINTMENT (OUTPATIENT)
Dept: DISASTER EMERGENCY | Facility: HOSPITAL | Age: 72
End: 2022-07-10

## 2022-07-10 VITALS
SYSTOLIC BLOOD PRESSURE: 155 MMHG | TEMPERATURE: 99 F | RESPIRATION RATE: 18 BRPM | HEART RATE: 58 BPM | OXYGEN SATURATION: 97 % | DIASTOLIC BLOOD PRESSURE: 77 MMHG

## 2022-07-10 VITALS
OXYGEN SATURATION: 98 % | RESPIRATION RATE: 17 BRPM | SYSTOLIC BLOOD PRESSURE: 111 MMHG | TEMPERATURE: 98 F | DIASTOLIC BLOOD PRESSURE: 77 MMHG | HEART RATE: 57 BPM

## 2022-07-10 DIAGNOSIS — U07.1 COVID-19: ICD-10-CM

## 2022-07-10 RX ORDER — BEBTELOVIMAB 87.5 MG/ML
175 INJECTION, SOLUTION INTRAVENOUS ONCE
Refills: 0 | Status: COMPLETED | OUTPATIENT
Start: 2022-07-10 | End: 2022-07-10

## 2022-07-10 RX ADMIN — BEBTELOVIMAB 175 MILLIGRAM(S): 87.5 INJECTION, SOLUTION INTRAVENOUS at 10:30

## 2022-07-10 NOTE — CHART NOTE - NSCHARTNOTEFT_GEN_A_CORE
CC: Monoclonal Antibody Infusion/COVID 19 Positive  71y Male with PMH of CKD stage 3, HTN  and recent dx of COVID 19+ who presents today for elective Bebtelovimab. Patient has been screened and was deemed to be a candidate.    Symptoms/ Criteria  Date of Symptom Onset: 7/5/2022  Symptoms: Body aches not due to chronic disease, malaise, fatigue, chills, headache  Date of Positive COVID PCR: 7/5/2022  Risk Profile includes: Age .65, chronic medical conditions: Cardiovascular and renal disease      Vaccination Status: Received Moderna vaccine series and one booster dose    PMHx:  Infection due to severe acute respiratory syndrome coronavirus 2 (SARS-CoV-2)    HTN (hypertension)    Hyperlipidemia    NO SIGNIFICANT PAST SURGICAL HISTORY        Exam/findings:  T(C): 37.2 (07-10-22 @ 10:30), Max: 37.2 (07-10-22 @ 10:30)  HR: 58 (07-10-22 @ 10:30) (58 - 58)  BP: 155/77 (07-10-22 @ 10:30) (155/77 - 155/77)  RR: 18 (07-10-22 @ 10:30) (18 - 18)  SpO2: 97% (07-10-22 @ 10:30) (97% - 97%)    PE:   Appearance: NAD	  HEENT:  NC/AT  Cardiovascular:  No edema  Respiratory: no use of accessory muscles  Gastrointestinal:  non-distended   Skin: warm and dry  Neurologic: Non-focal  Extremities: Normal range of motion    ASSESSMENT:  Pt is COVID positive with mild to moderate symptoms who was referred for elective MAB (Bebtelovimab).    PLAN:  - MAB treatment explained to patient. I have reviewed the Bebtelovimab Emergency Use Authorization (EUA) and I have provided the patient or patient's caregiver with the following information:   1. FDA has authorized emergency use of Bebtelovimab to be administered for the treatment of mild to moderate COVID-19, which is not an FDA-approved biological product.   2. The patient or patient's caregiver has the option to accept or refuse administration of MAB.   3. The significant known and potential risks and benefits of Bebtelovimab and the extent to which such risks and benefits are unknown.  4. Information on available alternative treatments and risks and benefits of those alternatives.  - Patient verbalized understanding of plan and agrees to treatment. All questions answered.  - Consent for MAB obtained.   - 175mg of Bebtelovimab administered as a single intravenous injection over at least 30 seconds.   - Observe patient for one hour post medication administration and then if stable, discharge home with outpatient follow up as planned by PCP.      POST ASSESSMENT:   Patient completed MAB, and monitored x 1 hour post-infusion with no adverse reactions noted, remained hemodynamically stable.  - Patient tolerated infusion well; denies complaints of chest pain/SOB/dizziness/palpitations.   - VSS for discharge home.  - D/C instructions given/ fact sheet included.  - Patient was instructed to self-isolate and use infection control measures (e.g wear mask, isolate, social distance, avoid sharing personal items, clean and disinfect "high touch" surfaces, and frequent handwashing according to the CDC guidelines.   - The patient was informed on what symptoms to be aware of for the next couple of days, and if there are any issues to call the 24/7 clinical call center. Patient was instructed to follow up with primary care provider as needed.    DISCHARGE stable

## 2022-09-30 ENCOUNTER — APPOINTMENT (OUTPATIENT)
Dept: FAMILY MEDICINE | Facility: CLINIC | Age: 72
End: 2022-09-30

## 2022-09-30 VITALS
TEMPERATURE: 97.2 F | HEART RATE: 54 BPM | DIASTOLIC BLOOD PRESSURE: 76 MMHG | SYSTOLIC BLOOD PRESSURE: 112 MMHG | WEIGHT: 203.4 LBS | BODY MASS INDEX: 29.18 KG/M2 | OXYGEN SATURATION: 98 % | RESPIRATION RATE: 14 BRPM

## 2022-09-30 DIAGNOSIS — Z12.5 ENCOUNTER FOR SCREENING FOR MALIGNANT NEOPLASM OF PROSTATE: ICD-10-CM

## 2022-09-30 PROCEDURE — G0444 DEPRESSION SCREEN ANNUAL: CPT

## 2022-09-30 PROCEDURE — 90686 IIV4 VACC NO PRSV 0.5 ML IM: CPT

## 2022-09-30 PROCEDURE — G0008: CPT

## 2022-09-30 PROCEDURE — 36415 COLL VENOUS BLD VENIPUNCTURE: CPT

## 2022-09-30 PROCEDURE — G0439: CPT

## 2022-09-30 RX ORDER — VENLAFAXINE HYDROCHLORIDE 75 MG/1
75 CAPSULE, EXTENDED RELEASE ORAL DAILY
Qty: 90 | Refills: 0 | Status: DISCONTINUED | COMMUNITY
Start: 2019-10-03 | End: 2022-09-30

## 2022-09-30 RX ORDER — ROSUVASTATIN CALCIUM 10 MG/1
10 TABLET, FILM COATED ORAL
Refills: 0 | Status: DISCONTINUED | COMMUNITY
End: 2022-09-30

## 2022-09-30 RX ORDER — LOSARTAN POTASSIUM 100 MG/1
100 TABLET, FILM COATED ORAL
Qty: 90 | Refills: 0 | Status: DISCONTINUED | COMMUNITY
Start: 2019-09-18 | End: 2022-09-30

## 2022-09-30 RX ORDER — HYDRALAZINE HYDROCHLORIDE 10 MG/1
10 TABLET ORAL EVERY 6 HOURS
Qty: 120 | Refills: 0 | Status: DISCONTINUED | COMMUNITY
Start: 2019-09-18 | End: 2022-09-30

## 2022-09-30 RX ORDER — TOBRAMYCIN AND DEXAMETHASONE 3; 1 MG/ML; MG/ML
0.3-0.1 SUSPENSION/ DROPS OPHTHALMIC EVERY 4 HOURS
Qty: 1 | Refills: 1 | Status: DISCONTINUED | COMMUNITY
Start: 2019-10-03 | End: 2022-09-30

## 2022-09-30 RX ORDER — VALSARTAN AND HYDROCHLOROTHIAZIDE 160; 12.5 MG/1; MG/1
160-12.5 TABLET, FILM COATED ORAL
Refills: 0 | Status: DISCONTINUED | COMMUNITY
End: 2022-09-30

## 2022-09-30 RX ORDER — HYDRALAZINE HYDROCHLORIDE 10 MG/1
10 TABLET ORAL
Qty: 60 | Refills: 3 | Status: DISCONTINUED | COMMUNITY
Start: 2019-09-04 | End: 2022-09-30

## 2022-09-30 RX ORDER — HYDRALAZINE HYDROCHLORIDE 25 MG/1
25 TABLET ORAL
Qty: 180 | Refills: 0 | Status: DISCONTINUED | COMMUNITY
Start: 2019-10-03 | End: 2022-09-30

## 2022-09-30 RX ORDER — OMEGA-3-ACID ETHYL ESTERS 1 G/1
1 CAPSULE, LIQUID FILLED ORAL
Refills: 0 | Status: DISCONTINUED | COMMUNITY
End: 2022-09-30

## 2022-09-30 RX ORDER — SILDENAFIL 20 MG/1
20 TABLET ORAL AS DIRECTED
Qty: 90 | Refills: 3 | Status: DISCONTINUED | COMMUNITY
Start: 2019-07-15 | End: 2022-09-30

## 2022-09-30 RX ORDER — LOSARTAN POTASSIUM 100 MG/1
100 TABLET, FILM COATED ORAL DAILY
Qty: 90 | Refills: 3 | Status: DISCONTINUED | COMMUNITY
Start: 2019-08-14 | End: 2022-09-30

## 2022-09-30 RX ORDER — ATORVASTATIN CALCIUM 20 MG/1
20 TABLET, FILM COATED ORAL
Qty: 90 | Refills: 2 | Status: ACTIVE | COMMUNITY
Start: 2022-09-30

## 2022-09-30 RX ORDER — FENOFIBRATE 145 MG/1
145 TABLET ORAL
Refills: 0 | Status: DISCONTINUED | COMMUNITY
End: 2022-09-30

## 2022-09-30 RX ORDER — LOSARTAN POTASSIUM AND HYDROCHLOROTHIAZIDE 12.5; 1 MG/1; MG/1
100-12.5 TABLET ORAL
Refills: 0 | Status: DISCONTINUED | COMMUNITY
End: 2022-09-30

## 2022-09-30 NOTE — REVIEW OF SYSTEMS
Sent to [facility] [Dizziness] : dizziness [Anxiety] : anxiety [Fever] : no fever [Chills] : no chills [Vision Problems] : no vision problems [Hearing Loss] : no hearing loss [Chest Pain] : no chest pain [Palpitations] : no palpitations [Lower Ext Edema] : no lower extremity edema [Shortness Of Breath] : no shortness of breath [Wheezing] : no wheezing [Cough] : no cough [Abdominal Pain] : no abdominal pain [Nausea] : no nausea [Constipation] : no constipation [Diarrhea] : no diarrhea [Vomiting] : no vomiting [Dysuria] : no dysuria [Hematuria] : no hematuria [Joint Pain] : no joint pain [Back Pain] : no back pain [Headache] : no headache [Depression] : no depression

## 2022-09-30 NOTE — HISTORY OF PRESENT ILLNESS
[FreeTextEntry1] : Pt is here for CPE. [de-identified] : 73 y/o male with pmhx of anxiety/depression, HLD, HTN, CKD, AAA presents for CPE. He was recently seen by cardiology (Dr. Moore) and put on atorvastatin and placed back on the vascepa. He is seeing his nephrologist Dr. Mandujano soon for his CKD. States he is overall doing well. He occasionally is feeling dizziness, states he has reduced the dose of his bystolic to 5mg daily as it was causing his bp to go too low. He is no longer taking the effexor. He is using xanax as needed but recently has been on a more frequent basis.

## 2022-09-30 NOTE — PHYSICAL EXAM
[Coordination Grossly Intact] : coordination grossly intact [No Focal Deficits] : no focal deficits [Normal Gait] : normal gait Yes [Normal] : affect was normal and insight and judgment were intact

## 2022-09-30 NOTE — ASSESSMENT
[FreeTextEntry1] : CPE:\par -will order CBC w/ diff, CMP, Lipid, TSH and HgbA1c, labs to be drawn in office\par \par Screening:\par -Colon Cancer screening: up to date, last in 2020 due again in 2025\par \par Vaccinations:\par Seasonal flu  - high dose flu vaccine administered in left deltoid today\par Tdap - recommended\par Shingles - recommended\par \par -previous notes and labs reviewed, consultants notes reviewed\par -patient expressed understanding of plan, all questions answered\par -follow up in 6 months\par \par HTN:\par patient with BP of 112/76 at today's visit\par -will continue current medication regimen of losartan-hctz 100-12.5mg daily, bystolic 5mg daily\par -Patient to discuss with cardiologist decreasing htn medications in setting of normal bp with occasional dizziness\par -patient to follow up in 6 months or as needed.\par \par HLD:\par -will check lipid panel\par -continue atorvastatin 20mg daily and vascepa\par \par Anxiety\par no longer on effexor\par pt has been taking xanax .25 mg as needed\par discussed harmful risks of benzos including risk of cognitive decline/dementia with long term benzo use, increased risk of hip fractures, car accidents, and risk of substance abuse/dependence\par if patient with persistent anxiety, recommend restarting an ssri or snri for better control as xanax not been to be taken on a regular basis\par \par CKD\par will check cmp\par f/u with nephro\par

## 2022-09-30 NOTE — HEALTH RISK ASSESSMENT
[Never] : Never [Yes] : Yes [2 - 3 times a week (3 pts)] : 2 - 3  times a week (3 points) [1 or 2 (0 pts)] : 1 or 2 (0 points) [Never (0 pts)] : Never (0 points) [No] : In the past 12 months have you used drugs other than those required for medical reasons? No [No falls in past year] : Patient reported no falls in the past year [0] : 2) Feeling down, depressed, or hopeless: Not at all (0) [PHQ-2 Negative - No further assessment needed] : PHQ-2 Negative - No further assessment needed [With Family] : lives with family [Retired] : retired [# Of Children ___] : has [unfilled] children [Audit-CScore] : 3 [[id number]] : 0 [ColonoscopyDate] : 2020 [ColonoscopyComments] : follow up in 2025

## 2022-10-01 LAB
ALBUMIN SERPL ELPH-MCNC: 4.5 G/DL
ALP BLD-CCNC: 93 U/L
ALT SERPL-CCNC: 36 U/L
ANION GAP SERPL CALC-SCNC: 13 MMOL/L
AST SERPL-CCNC: 23 U/L
BASOPHILS # BLD AUTO: 0.05 K/UL
BASOPHILS NFR BLD AUTO: 0.8 %
BILIRUB SERPL-MCNC: 0.6 MG/DL
BUN SERPL-MCNC: 24 MG/DL
CALCIUM SERPL-MCNC: 10 MG/DL
CHLORIDE SERPL-SCNC: 103 MMOL/L
CHOLEST SERPL-MCNC: 144 MG/DL
CO2 SERPL-SCNC: 26 MMOL/L
CREAT SERPL-MCNC: 1.44 MG/DL
EGFR: 52 ML/MIN/1.73M2
EOSINOPHIL # BLD AUTO: 0.29 K/UL
EOSINOPHIL NFR BLD AUTO: 4.5 %
ESTIMATED AVERAGE GLUCOSE: 120 MG/DL
FOLATE SERPL-MCNC: 17.9 NG/ML
GLUCOSE SERPL-MCNC: 107 MG/DL
HBA1C MFR BLD HPLC: 5.8 %
HCT VFR BLD CALC: 47.4 %
HDLC SERPL-MCNC: 28 MG/DL
HGB BLD-MCNC: 15.7 G/DL
IMM GRANULOCYTES NFR BLD AUTO: 0.5 %
LDLC SERPL CALC-MCNC: 61 MG/DL
LYMPHOCYTES # BLD AUTO: 1.86 K/UL
LYMPHOCYTES NFR BLD AUTO: 29 %
MAN DIFF?: NORMAL
MCHC RBC-ENTMCNC: 30.8 PG
MCHC RBC-ENTMCNC: 33.1 GM/DL
MCV RBC AUTO: 92.9 FL
MONOCYTES # BLD AUTO: 0.36 K/UL
MONOCYTES NFR BLD AUTO: 5.6 %
NEUTROPHILS # BLD AUTO: 3.83 K/UL
NEUTROPHILS NFR BLD AUTO: 59.6 %
NONHDLC SERPL-MCNC: 116 MG/DL
PLATELET # BLD AUTO: 117 K/UL
POTASSIUM SERPL-SCNC: 4.3 MMOL/L
PROT SERPL-MCNC: 7.2 G/DL
PSA SERPL-MCNC: 4.21 NG/ML
RBC # BLD: 5.1 M/UL
RBC # FLD: 13.2 %
SODIUM SERPL-SCNC: 142 MMOL/L
TRIGL SERPL-MCNC: 274 MG/DL
VIT B12 SERPL-MCNC: 481 PG/ML
WBC # FLD AUTO: 6.42 K/UL

## 2023-02-08 ENCOUNTER — APPOINTMENT (OUTPATIENT)
Dept: FAMILY MEDICINE | Facility: CLINIC | Age: 73
End: 2023-02-08
Payer: MEDICARE

## 2023-02-08 VITALS
HEIGHT: 60 IN | TEMPERATURE: 97.3 F | WEIGHT: 203 LBS | SYSTOLIC BLOOD PRESSURE: 120 MMHG | DIASTOLIC BLOOD PRESSURE: 70 MMHG | OXYGEN SATURATION: 97 % | BODY MASS INDEX: 39.85 KG/M2 | HEART RATE: 59 BPM

## 2023-02-08 PROCEDURE — 99214 OFFICE O/P EST MOD 30 MIN: CPT

## 2023-02-08 RX ORDER — NEBIVOLOL HYDROCHLORIDE 5 MG/1
5 TABLET ORAL
Refills: 0 | Status: ACTIVE | COMMUNITY
Start: 2022-09-30

## 2023-02-08 NOTE — ASSESSMENT
[FreeTextEntry1] : HTN:\par patient with BP of 120/70 at today's visit\par -will continue current medication regimen of losartan-hctz 100-12.5mg daily, bystolic 5mg daily\par -patient to follow up in 6 months for cpe\par \par HLD:\par -continue atorvastatin 20mg daily and vascepa\par \par CKD\par stable, continue to follow with nephrology\par \par Elevated PSA\par was evaluated by urology\par \par Thrombocytopenia\par chronic, stable\par saw hematology in the past

## 2023-02-08 NOTE — REVIEW OF SYSTEMS
[Dizziness] : dizziness [Anxiety] : anxiety [Fever] : no fever [Chills] : no chills [Vision Problems] : no vision problems [Hearing Loss] : no hearing loss [Chest Pain] : no chest pain [Palpitations] : no palpitations [Lower Ext Edema] : no lower extremity edema [Shortness Of Breath] : no shortness of breath [Wheezing] : no wheezing [Cough] : no cough [Abdominal Pain] : no abdominal pain [Nausea] : no nausea [Constipation] : no constipation [Diarrhea] : no diarrhea [Vomiting] : no vomiting [Dysuria] : no dysuria [Hematuria] : no hematuria [Joint Pain] : no joint pain [Back Pain] : no back pain [Headache] : no headache [Depression] : no depression

## 2023-02-08 NOTE — HISTORY OF PRESENT ILLNESS
[FreeTextEntry1] : here for check up [de-identified] : 73 y/o male with pmhx of anxiety/depression, HLD, HTN, CKD, AAA, thrombocytopenia presents for follow up\par \par For history of HTN and HLD:He follows with cardiology (Dr. Moore) and is on atorvastatin and vascepa.For bp patient is currently on bystolic 5mg daily and losartan-hctz 100-12.5mg daily. Had an TTE recently\par \par For CKD:  He is seeing his nephrologist Dr. Mandujano for his CKD, recently had lab work\par \par For elevated PSA: saw urology, states he was told everything was normal and psa elevation was likely due to age and will continue to monitor

## 2023-09-06 ENCOUNTER — APPOINTMENT (OUTPATIENT)
Dept: FAMILY MEDICINE | Facility: CLINIC | Age: 73
End: 2023-09-06

## 2023-09-06 RX ORDER — ALPRAZOLAM 0.25 MG/1
0.25 TABLET ORAL
Qty: 60 | Refills: 0 | Status: DISCONTINUED | COMMUNITY
Start: 2022-04-13 | End: 2023-09-06

## 2023-10-04 ENCOUNTER — APPOINTMENT (OUTPATIENT)
Dept: FAMILY MEDICINE | Facility: CLINIC | Age: 73
End: 2023-10-04
Payer: MEDICARE

## 2023-10-04 VITALS
RESPIRATION RATE: 14 BRPM | TEMPERATURE: 98 F | SYSTOLIC BLOOD PRESSURE: 122 MMHG | HEIGHT: 70 IN | HEART RATE: 59 BPM | OXYGEN SATURATION: 97 % | BODY MASS INDEX: 29.35 KG/M2 | WEIGHT: 205 LBS | DIASTOLIC BLOOD PRESSURE: 74 MMHG

## 2023-10-04 DIAGNOSIS — R97.20 ELEVATED PROSTATE, SPECIFIC ANTIGEN [PSA]: ICD-10-CM

## 2023-10-04 DIAGNOSIS — Z23 ENCOUNTER FOR IMMUNIZATION: ICD-10-CM

## 2023-10-04 DIAGNOSIS — Z00.00 ENCOUNTER FOR GENERAL ADULT MEDICAL EXAMINATION W/OUT ABNORMAL FINDINGS: ICD-10-CM

## 2023-10-04 PROCEDURE — 36415 COLL VENOUS BLD VENIPUNCTURE: CPT

## 2023-10-04 PROCEDURE — 90714 TD VACC NO PRESV 7 YRS+ IM: CPT

## 2023-10-04 PROCEDURE — G0008: CPT | Mod: 59

## 2023-10-04 PROCEDURE — G0439: CPT

## 2023-10-04 PROCEDURE — 90662 IIV NO PRSV INCREASED AG IM: CPT

## 2023-10-04 PROCEDURE — 90471 IMMUNIZATION ADMIN: CPT

## 2024-01-16 ENCOUNTER — OFFICE (OUTPATIENT)
Dept: URBAN - METROPOLITAN AREA CLINIC 104 | Facility: CLINIC | Age: 74
Setting detail: OPHTHALMOLOGY
End: 2024-01-16
Payer: COMMERCIAL

## 2024-01-16 DIAGNOSIS — H01.002: ICD-10-CM

## 2024-01-16 DIAGNOSIS — H01.004: ICD-10-CM

## 2024-01-16 DIAGNOSIS — H01.005: ICD-10-CM

## 2024-01-16 DIAGNOSIS — H25.13: ICD-10-CM

## 2024-01-16 DIAGNOSIS — H01.001: ICD-10-CM

## 2024-01-16 DIAGNOSIS — H43.812: ICD-10-CM

## 2024-01-16 DIAGNOSIS — H52.4: ICD-10-CM

## 2024-01-16 PROCEDURE — 92004 COMPRE OPH EXAM NEW PT 1/>: CPT | Performed by: OPTOMETRIST

## 2024-01-16 PROCEDURE — 92015 DETERMINE REFRACTIVE STATE: CPT | Performed by: OPTOMETRIST

## 2024-01-16 ASSESSMENT — REFRACTION_CURRENTRX
OS_ADD: +2.25
OD_ADD: +2.25
OS_VPRISM_DIRECTION: PROGS
OS_OVR_VA: 20/
OS_CYLINDER: SPH
OD_VPRISM_DIRECTION: PROGS
OD_CYLINDER: SPH
OS_SPHERE: +2.00
OD_SPHERE: +2.25
OD_OVR_VA: 20/

## 2024-01-16 ASSESSMENT — LID EXAM ASSESSMENTS
OD_BLEPHARITIS: RLL RUL 2+ 3+
OS_BLEPHARITIS: LLL LUL 2+ 3+

## 2024-01-16 ASSESSMENT — REFRACTION_MANIFEST
OS_CYLINDER: SPH
OS_ADD: +2.50
OD_AXIS: 065
OD_VA1: 20/20
OD_ADD: +2.50
OD_CYLINDER: -0.50
OD_SPHERE: +2.25
OS_VA1: 20/25
OS_SPHERE: +2.00

## 2024-01-16 ASSESSMENT — SPHEQUIV_DERIVED
OD_SPHEQUIV: 2.75
OS_SPHEQUIV: 2
OD_SPHEQUIV: 2

## 2024-01-16 ASSESSMENT — REFRACTION_AUTOREFRACTION
OD_SPHERE: +3.25
OD_AXIS: 095
OD_CYLINDER: -1.00
OS_SPHERE: +2.25
OS_CYLINDER: -0.50
OS_AXIS: 114

## 2024-01-16 ASSESSMENT — CONFRONTATIONAL VISUAL FIELD TEST (CVF)
OD_FINDINGS: FULL
OS_FINDINGS: FULL

## 2024-03-22 ENCOUNTER — NON-APPOINTMENT (OUTPATIENT)
Age: 74
End: 2024-03-22

## 2024-03-26 ENCOUNTER — APPOINTMENT (OUTPATIENT)
Dept: THORACIC SURGERY | Facility: CLINIC | Age: 74
End: 2024-03-26
Payer: MEDICARE

## 2024-03-26 VITALS
OXYGEN SATURATION: 98 % | HEART RATE: 64 BPM | HEIGHT: 69 IN | SYSTOLIC BLOOD PRESSURE: 146 MMHG | DIASTOLIC BLOOD PRESSURE: 79 MMHG | RESPIRATION RATE: 16 BRPM | WEIGHT: 200 LBS | BODY MASS INDEX: 29.62 KG/M2

## 2024-03-26 DIAGNOSIS — F32.A DEPRESSION, UNSPECIFIED: ICD-10-CM

## 2024-03-26 PROCEDURE — 99205 OFFICE O/P NEW HI 60 MIN: CPT

## 2024-03-26 RX ORDER — TAMSULOSIN HYDROCHLORIDE 0.4 MG/1
0.4 CAPSULE ORAL
Refills: 0 | Status: ACTIVE | COMMUNITY

## 2024-03-26 RX ORDER — UBIDECARENONE 30 MG
CAPSULE ORAL
Refills: 0 | Status: ACTIVE | COMMUNITY

## 2024-03-26 RX ORDER — SILDENAFIL 100 MG/1
100 TABLET, FILM COATED ORAL
Qty: 30 | Refills: 5 | Status: COMPLETED | COMMUNITY
Start: 2022-04-13 | End: 2024-03-26

## 2024-03-26 NOTE — HISTORY OF PRESENT ILLNESS
[FreeTextEntry1] : Mr. KAYLEE WARNER, 73 year old male, former smoker, w/ hx of HTN, HLD, Depression, CKD Stage III, BCC of face, who c/o chest congestion, nasal congestion and cough, went to urgent care, found to have abnormal CXR, was given a course of ABX and steroid. Subsequent CT and PET showed AUGIE nodule and mediastinal lymphadenopathy. Referred by [facility] for evaluation.   CT chest on 03/07/2024: ([facility]) - There is a lobulated spiculated 2.2 cm lesion in the left upper lobe abutting the major fissure (series 3 image 195), and a 6 mm right upper lobe nodule on the saved.  PET/CT on 03/18/2024: ([facility]) - There is intense FDG activity corresponding with a left upper lobe mass (SUV 12.1, 2.0 x 2.0 cm, series 3 image 101). This corresponds with the nodule described in this region on the recent CT chest of 3/7/2024. Malignancy is suspected.  - There are intensely FDG avid mediastinal and bilateral hilar lymph nodes. For example a left subcarinal node (SUV 8.0, 1.7 x 1.0 cm, series 3 image 97); a left hilar node (SUV 6.4); and in the right hilar region (SUV 3.7). Metastatic lymphadenopathy is suspected.   Patient is here today for CT surgery consultation. Patient denies shortness of breath, cough, chest pain, fever, chills, loss of appetite, weight loss, or hemoptysis.

## 2024-03-26 NOTE — PHYSICAL EXAM
[General Appearance - Alert] : alert [General Appearance - In No Acute Distress] : in no acute distress [Sclera] : the sclera and conjunctiva were normal [PERRL With Normal Accommodation] : pupils were equal in size, round, and reactive to light [Outer Ear] : the ears and nose were normal in appearance [Extraocular Movements] : extraocular movements were intact [Oropharynx] : the oropharynx was normal [Neck Appearance] : the appearance of the neck was normal [Jugular Venous Distention Increased] : there was no jugular-venous distention [Neck Cervical Mass (___cm)] : no neck mass was observed [Thyroid Diffuse Enlargement] : the thyroid was not enlarged [Thyroid Nodule] : there were no palpable thyroid nodules [Auscultation Breath Sounds / Voice Sounds] : lungs were clear to auscultation bilaterally [Heart Rate And Rhythm] : heart rate was normal and rhythm regular [Heart Sounds] : normal S1 and S2 [Heart Sounds Gallop] : no gallops [Murmurs] : no murmurs [Heart Sounds Pericardial Friction Rub] : no pericardial rub [Examination Of The Chest] : the chest was normal in appearance [Chest Visual Inspection Thoracic Asymmetry] : no chest asymmetry [Diminished Respiratory Excursion] : normal chest expansion [2+] : left 2+ [No Abnormalities] : the abdominal aorta was not enlarged and no bruit was heard [Breast Appearance] : normal in appearance [Breast Palpation Mass] : no palpable masses [Bowel Sounds] : normal bowel sounds [Abdomen Tenderness] : non-tender [Abdomen Soft] : soft [Abdomen Mass (___ Cm)] : no abdominal mass palpated [Cervical Lymph Nodes Enlarged Posterior Bilaterally] : posterior cervical [Supraclavicular Lymph Nodes Enlarged Bilaterally] : supraclavicular [Cervical Lymph Nodes Enlarged Anterior Bilaterally] : anterior cervical [No Spinal Tenderness] : no spinal tenderness [No CVA Tenderness] : no ~M costovertebral angle tenderness [Abnormal Walk] : normal gait [Nail Clubbing] : no clubbing  or cyanosis of the fingernails [Musculoskeletal - Swelling] : no joint swelling seen [Motor Tone] : muscle strength and tone were normal [Skin Turgor] : normal skin turgor [Skin Color & Pigmentation] : normal skin color and pigmentation [Deep Tendon Reflexes (DTR)] : deep tendon reflexes were 2+ and symmetric [] : no rash [No Focal Deficits] : no focal deficits [Sensation] : the sensory exam was normal to light touch and pinprick [Impaired Insight] : insight and judgment were intact [Oriented To Time, Place, And Person] : oriented to person, place, and time [Affect] : the affect was normal [Right Carotid Bruit] : no bruit heard over the right carotid [Left Carotid Bruit] : no bruit heard over the left carotid [Right Femoral Bruit] : no bruit heard over the right femoral artery [Left Femoral Bruit] : no bruit heard over the left femoral artery [FreeTextEntry1] : Deferred

## 2024-03-26 NOTE — ASSESSMENT
[FreeTextEntry1] : Mr. KAYLEE WARNER, 73 year old male, former smoker, w/ hx of HTN, HLD, Depression, CKD Stage III, BCC of face, who c/o chest congestion, nasal congestion and cough, went to urgent care, found to have abnormal CXR, was given a course of ABX and steroid. Subsequent CT and PET showed AUGIE nodule and mediastinal lymphadenopathy. Referred by [facility] for evaluation.   CT chest on 03/07/2024: ([facility]) - There is a lobulated spiculated 2.2 cm lesion in the left upper lobe abutting the major fissure (series 3 image 195), and a 6 mm right upper lobe nodule on the saved.  PET/CT on 03/18/2024: ([facility]) - There is intense FDG activity corresponding with a left upper lobe mass (SUV 12.1, 2.0 x 2.0 cm, series 3 image 101). This corresponds with the nodule described in this region on the recent CT chest of 3/7/2024. Malignancy is suspected.  - There are intensely FDG avid mediastinal and bilateral hilar lymph nodes. For example a left subcarinal node (SUV 8.0, 1.7 x 1.0 cm, series 3 image 97); a left hilar node (SUV 6.4); and in the right hilar region (SUV 3.7). Metastatic lymphadenopathy is suspected.   I have reviewed the patient's medical records and diagnostic images at time of this office consultation and have made the following recommendation: 1. CT chest and PET/CT reviewed and explained to patient, AUGIE nodule is suspicious for malignancy. Mediastinal lymphadenopathy could be reactive/metastatic disease. We discussed surgical biopsy for the LNs first. If LNs are reactive, will discuss lung resection in the near future. Will schedule for Flexible Bronchoscopy, Endobronchial ultrasound biopsy on 04/09/2024. The risks benefits and alternatives of the procedure were explained to the patient. All of his questions were answered, and patient freely consented to the procedure.  2. Medical clearance, Cardiac clearance, and PST.  3. Obtain PFTs from Dr. Roddy Choi office 4. Obtain CT and PET.CT CD from  and upload.   Preoperative checklist: - Anticoagulation/antiplatelets: None  I, AROLDO Rhodes, personally performed the evaluation and management (E/M) services for this new patient.  That E/M includes conducting the initial examination, assessing all conditions, and establishing the plan of care.  Today, my ACP, Jacquie Albert ANP-C, was here to observe my evaluation and management services for this patient to be followed going forward.

## 2024-03-27 ENCOUNTER — NON-APPOINTMENT (OUTPATIENT)
Age: 74
End: 2024-03-27

## 2024-04-05 ENCOUNTER — OUTPATIENT (OUTPATIENT)
Dept: OUTPATIENT SERVICES | Facility: HOSPITAL | Age: 74
LOS: 1 days | End: 2024-04-05

## 2024-04-05 VITALS
OXYGEN SATURATION: 97 % | WEIGHT: 201.94 LBS | SYSTOLIC BLOOD PRESSURE: 143 MMHG | HEIGHT: 69 IN | HEART RATE: 58 BPM | TEMPERATURE: 98 F | DIASTOLIC BLOOD PRESSURE: 76 MMHG | RESPIRATION RATE: 16 BRPM

## 2024-04-05 DIAGNOSIS — Z98.890 OTHER SPECIFIED POSTPROCEDURAL STATES: Chronic | ICD-10-CM

## 2024-04-05 DIAGNOSIS — G47.33 OBSTRUCTIVE SLEEP APNEA (ADULT) (PEDIATRIC): ICD-10-CM

## 2024-04-05 DIAGNOSIS — I10 ESSENTIAL (PRIMARY) HYPERTENSION: ICD-10-CM

## 2024-04-05 DIAGNOSIS — R59.0 LOCALIZED ENLARGED LYMPH NODES: ICD-10-CM

## 2024-04-05 DIAGNOSIS — R91.1 SOLITARY PULMONARY NODULE: ICD-10-CM

## 2024-04-05 LAB
ANION GAP SERPL CALC-SCNC: 12 MMOL/L — SIGNIFICANT CHANGE UP (ref 7–14)
BUN SERPL-MCNC: 20 MG/DL — SIGNIFICANT CHANGE UP (ref 7–23)
CALCIUM SERPL-MCNC: 10 MG/DL — SIGNIFICANT CHANGE UP (ref 8.4–10.5)
CHLORIDE SERPL-SCNC: 102 MMOL/L — SIGNIFICANT CHANGE UP (ref 98–107)
CO2 SERPL-SCNC: 27 MMOL/L — SIGNIFICANT CHANGE UP (ref 22–31)
CREAT SERPL-MCNC: 1.44 MG/DL — HIGH (ref 0.5–1.3)
EGFR: 52 ML/MIN/1.73M2 — LOW
GLUCOSE SERPL-MCNC: 100 MG/DL — HIGH (ref 70–99)
HCT VFR BLD CALC: 44.9 % — SIGNIFICANT CHANGE UP (ref 39–50)
HGB BLD-MCNC: 15.1 G/DL — SIGNIFICANT CHANGE UP (ref 13–17)
MCHC RBC-ENTMCNC: 30.1 PG — SIGNIFICANT CHANGE UP (ref 27–34)
MCHC RBC-ENTMCNC: 33.6 GM/DL — SIGNIFICANT CHANGE UP (ref 32–36)
MCV RBC AUTO: 89.4 FL — SIGNIFICANT CHANGE UP (ref 80–100)
NRBC # BLD: 0 /100 WBCS — SIGNIFICANT CHANGE UP (ref 0–0)
NRBC # FLD: 0 K/UL — SIGNIFICANT CHANGE UP (ref 0–0)
PLATELET # BLD AUTO: 148 K/UL — LOW (ref 150–400)
POTASSIUM SERPL-MCNC: 4.2 MMOL/L — SIGNIFICANT CHANGE UP (ref 3.5–5.3)
POTASSIUM SERPL-SCNC: 4.2 MMOL/L — SIGNIFICANT CHANGE UP (ref 3.5–5.3)
RBC # BLD: 5.02 M/UL — SIGNIFICANT CHANGE UP (ref 4.2–5.8)
RBC # FLD: 12.9 % — SIGNIFICANT CHANGE UP (ref 10.3–14.5)
SODIUM SERPL-SCNC: 141 MMOL/L — SIGNIFICANT CHANGE UP (ref 135–145)
WBC # BLD: 5.93 K/UL — SIGNIFICANT CHANGE UP (ref 3.8–10.5)
WBC # FLD AUTO: 5.93 K/UL — SIGNIFICANT CHANGE UP (ref 3.8–10.5)

## 2024-04-05 RX ORDER — SODIUM CHLORIDE 9 MG/ML
1000 INJECTION INTRAMUSCULAR; INTRAVENOUS; SUBCUTANEOUS
Refills: 0 | Status: DISCONTINUED | OUTPATIENT
Start: 2024-04-09 | End: 2024-04-23

## 2024-04-05 NOTE — H&P PST ADULT - HISTORY OF PRESENT ILLNESS
Patient is a 64y old  Male who presents with a chief complaint of chest pain and SOB (03 Mar 2024 10:02)      HPI:  64 years old male history of alcohol abuse, hypertension, COPD? ( heavy smoker. acc to pt had PFTs done and uses inhalers at home ), recently diagnosed with hyperthyroidism few weeks ago and started on methimazole 5 mg by PMD presents complaining of palpitations and exertional shortness of breath over the past few weeks.   At my encounter family not at bedside, acc to pt his only complaint is palpitaiosn, His HR was ranging 120-130 at home. No chest pain. He has cough and mild SOB which he attributes to smoking 1 pack daily. Otherwise he describes himself as very healthy. Pt is tremelous and restless. He admitted to drinking alcohol  (15-20 drinks of liquor daily). Last drink was earlier this evening.  Patient also reports he does want to stop drinking and try to cut down his alcohol use.    As per family, patient was seen by his cardiologist Dr. Johnson who increased that his metoprolol to 75 mg twice a day which did not improve his heart rate.  Patient had outpatient echocardiogram yesterday and noted to be tachycardic so he was recommended to come to ED for evaluation.  Otherwise denies fever, chills, recent illness, coughing, chest pain, abdominal pain, diarrhea or urinary symptoms.  Denies drug use.    Vitals: T 98.2, , /90, spo2 94% on RA   Labs remarkable for elevated ALT AST, Mg 1.5  Lactate 4 on VBGs   Alcohol level 202   CXR clear   EKG showed sinus tachycardia     s/p valium and ativan in ED  s/p 1 L NS, magnesium   HR improved from 130 to 90s  (29 Feb 2024 01:57)      EP consulted for     REVIEW OF SYSTEMS    [ ] A ten-point review of systems was otherwise negative except as noted.  [x] Due to altered mental status/intubation, subjective information were not able to be obtained from the patient. History was obtained, to the extent possible, from review of the chart and collateral sources of information.      PAST MEDICAL & SURGICAL HISTORY:  HTN (hypertension)      Alcohol abuse      Hyperthyroidism          Home Medications:      Allergies:    No Known Allergies      FAMILY HISTORY:      SOCIAL HISTORY:  CIGARETTES:  ALCOHOL:    MEDICATIONS  (STANDING):  albuterol/ipratropium for Nebulization 3 milliLiter(s) Nebulizer every 6 hours  cefTRIAXone   IVPB 1000 milliGRAM(s) IV Intermittent every 24 hours  cefTRIAXone   IVPB      chlorhexidine 0.12% Liquid 15 milliLiter(s) Oral Mucosa two times a day  chlorhexidine 2% Cloths 1 Application(s) Topical daily  dexMEDEtomidine Infusion 0.054 MICROgram(s)/kG/Hr (0.99 mL/Hr) IV Continuous <Continuous>  fentaNYL   Infusion. 0.539 MICROgram(s)/kG/Hr (3.94 mL/Hr) IV Continuous <Continuous>  folic acid 1 milliGRAM(s) Oral daily  heparin  Infusion. 1000 Unit(s)/Hr (10 mL/Hr) IV Continuous <Continuous>  influenza   Vaccine 0.5 milliLiter(s) IntraMuscular once  lactated ringers Bolus 1000 milliLiter(s) IV Bolus once  lactated ringers. 1000 milliLiter(s) (75 mL/Hr) IV Continuous <Continuous>  LORazepam   Injectable 1 milliGRAM(s) IV Push every 4 hours  LORazepam   Injectable   IV Push   methimazole 5 milliGRAM(s) Oral daily  metoprolol tartrate 75 milliGRAM(s) Oral two times a day  multivitamin 1 Tablet(s) Oral daily  nystatin    Suspension 245338 Unit(s) Oral four times a day  polyethylene glycol 3350 17 Gram(s) Oral daily  predniSONE   Tablet 40 milliGRAM(s) Oral daily  propofol Infusion 10.784 MICROgram(s)/kG/Min (4.73 mL/Hr) IV Continuous <Continuous>  senna 2 Tablet(s) Oral at bedtime  thiamine IVPB 500 milliGRAM(s) IV Intermittent every 8 hours    MEDICATIONS  (PRN):  acetaminophen     Tablet .. 650 milliGRAM(s) Oral every 6 hours PRN Temp greater or equal to 38C (100.4F), Mild Pain (1 - 3)  haloperidol    Injectable 5 milliGRAM(s) IntraMuscular every 6 hours PRN Agitation  heparin   Injectable 4700 Unit(s) IV Push every 6 hours PRN For aPTT less than 40  LORazepam   Injectable 2 milliGRAM(s) IV Push every 2 hours PRN Symptom-triggered: each CIWA -Ar score 8 or GREATER      Vital Signs Last 24 Hrs  T(C): 38.1 (03 Mar 2024 06:00), Max: 38.1 (03 Mar 2024 06:00)  T(F): 100.6 (03 Mar 2024 06:00), Max: 100.6 (03 Mar 2024 06:00)  HR: 61 (03 Mar 2024 08:37) (57 - 74)  BP: 124/71 (03 Mar 2024 08:00) (96/54 - 124/71)  BP(mean): 91 (03 Mar 2024 08:00) (70 - 91)  RR: 15 (03 Mar 2024 08:00) (15 - 54)  SpO2: 99% (03 Mar 2024 08:37) (96% - 100%)    Parameters below as of 03 Mar 2024 08:00  Patient On (Oxygen Delivery Method): ventilator    O2 Concentration (%): 80    PHYSICAL EXAM:    GENERAL: In no apparent distress, well nourished, and hydrated.  HEART: Regular rate and rhythm; No murmurs, rubs, or gallops.  PULMONARY: Clear to auscultation and perfusion.  No rales, wheezing, or rhonchi bilaterally.  ABDOMEN: Soft, Nontender, Nondistended; Bowel sounds present  EXTREMITIES:  2+ Peripheral Pulses, No clubbing, cyanosis, or edema  NEUROLOGICAL: AO x4, [place], speech clear    INTERPRETATION OF TELEMETRY:    ECG:    I&O's Detail    02 Mar 2024 07:01  -  03 Mar 2024 07:00  --------------------------------------------------------  IN:    Dexmedetomidine: 73 mL    Dexmedetomidine: 197.4 mL    FentaNYL: 146 mL    FentaNYL: 204.4 mL    Heparin Infusion: 198 mL    IV PiggyBack: 150 mL    Jevity 1.2: 240 mL    Lactated Ringers: 1800 mL    Lactated Ringers Bolus: 1000 mL    Propofol: 26.4 mL  Total IN: 4035.2 mL    OUT:    Indwelling Catheter - Urethral (mL): 875 mL    Propofol: 0 mL  Total OUT: 875 mL    Total NET: 3160.2 mL      03 Mar 2024 07:01  -  03 Mar 2024 10:49  --------------------------------------------------------  IN:    Dexmedetomidine: 18.3 mL    FentaNYL: 14.6 mL    Heparin Infusion: 6 mL    Lactated Ringers: 75 mL  Total IN: 113.9 mL    OUT:    Propofol: 0 mL  Total OUT: 0 mL    Total NET: 113.9 mL          LABS:                        13.8   15.13 )-----------( 120      ( 03 Mar 2024 04:41 )             40.7     03-03    140  |  108  |  19  ----------------------------<  97  4.3   |  24  |  0.7    Ca    8.1<L>      03 Mar 2024 04:41  Phos  2.6     03-01  Mg     2.3     03-03    TPro  5.3<L>  /  Alb  2.7<L>  /  TBili  1.8<H>  /  DBili  x   /  AST  57<H>  /  ALT  40  /  AlkPhos  97  03-03        PTT - ( 03 Mar 2024 00:45 )  PTT:>200.0 sec  BNP      I&O's Detail    02 Mar 2024 07:01  -  03 Mar 2024 07:00  --------------------------------------------------------  IN:    Dexmedetomidine: 73 mL    Dexmedetomidine: 197.4 mL    FentaNYL: 146 mL    FentaNYL: 204.4 mL    Heparin Infusion: 198 mL    IV PiggyBack: 150 mL    Jevity 1.2: 240 mL    Lactated Ringers: 1800 mL    Lactated Ringers Bolus: 1000 mL    Propofol: 26.4 mL  Total IN: 4035.2 mL    OUT:    Indwelling Catheter - Urethral (mL): 875 mL    Propofol: 0 mL  Total OUT: 875 mL    Total NET: 3160.2 mL      03 Mar 2024 07:01  -  03 Mar 2024 10:49  --------------------------------------------------------  IN:    Dexmedetomidine: 18.3 mL    FentaNYL: 14.6 mL    Heparin Infusion: 6 mL    Lactated Ringers: 75 mL  Total IN: 113.9 mL    OUT:    Propofol: 0 mL  Total OUT: 0 mL    Total NET: 113.9 mL          RADIOLOGY: Patient is a 64y old  Male who presents with a chief complaint of chest pain and SOB (03 Mar 2024 10:02)      HPI:  64 years old male history of alcohol abuse, hypertension, COPD? ( heavy smoker. acc to pt had PFTs done and uses inhalers at home ), recently diagnosed with hyperthyroidism few weeks ago and started on methimazole 5 mg by PMD presents complaining of palpitations and exertional shortness of breath over the past few weeks.   At my encounter family not at bedside, acc to pt his only complaint is palpitaiosn, His HR was ranging 120-130 at home. No chest pain. He has cough and mild SOB which he attributes to smoking 1 pack daily. Otherwise he describes himself as very healthy. Pt is tremelous and restless. He admitted to drinking alcohol  (15-20 drinks of liquor daily). Last drink was earlier this evening.  Patient also reports he does want to stop drinking and try to cut down his alcohol use.    As per family, patient was seen by his cardiologist Dr. Johnson who increased that his metoprolol to 75 mg twice a day which did not improve his heart rate.  Patient had outpatient echocardiogram yesterday and noted to be tachycardic so he was recommended to come to ED for evaluation.  Otherwise denies fever, chills, recent illness, coughing, chest pain, abdominal pain, diarrhea or urinary symptoms.  Denies drug use.    Vitals: T 98.2, , /90, spo2 94% on RA   Labs remarkable for elevated ALT AST, Mg 1.5  Lactate 4 on VBGs   Alcohol level 202   CXR clear   EKG showed sinus tachycardia     s/p valium and ativan in ED  s/p 1 L NS, magnesium   HR improved from 130 to 90s  (29 Feb 2024 01:57)      EP consulted for suspected AF episode while in the ED.     REVIEW OF SYSTEMS    [ ] A ten-point review of systems was otherwise negative except as noted.  [x] Due to altered mental status/intubation, subjective information were not able to be obtained from the patient. History was obtained, to the extent possible, from review of the chart and collateral sources of information.      PAST MEDICAL & SURGICAL HISTORY:  HTN (hypertension)      Alcohol abuse      Hyperthyroidism          Home Medications:      Allergies:    No Known Allergies      FAMILY HISTORY:      SOCIAL HISTORY:  CIGARETTES: Denies  ALCOHOL: YES    MEDICATIONS  (STANDING):  albuterol/ipratropium for Nebulization 3 milliLiter(s) Nebulizer every 6 hours  cefTRIAXone   IVPB 1000 milliGRAM(s) IV Intermittent every 24 hours  cefTRIAXone   IVPB      chlorhexidine 0.12% Liquid 15 milliLiter(s) Oral Mucosa two times a day  chlorhexidine 2% Cloths 1 Application(s) Topical daily  dexMEDEtomidine Infusion 0.054 MICROgram(s)/kG/Hr (0.99 mL/Hr) IV Continuous <Continuous>  fentaNYL   Infusion. 0.539 MICROgram(s)/kG/Hr (3.94 mL/Hr) IV Continuous <Continuous>  folic acid 1 milliGRAM(s) Oral daily  heparin  Infusion. 1000 Unit(s)/Hr (10 mL/Hr) IV Continuous <Continuous>  influenza   Vaccine 0.5 milliLiter(s) IntraMuscular once  lactated ringers Bolus 1000 milliLiter(s) IV Bolus once  lactated ringers. 1000 milliLiter(s) (75 mL/Hr) IV Continuous <Continuous>  LORazepam   Injectable 1 milliGRAM(s) IV Push every 4 hours  LORazepam   Injectable   IV Push   methimazole 5 milliGRAM(s) Oral daily  metoprolol tartrate 75 milliGRAM(s) Oral two times a day  multivitamin 1 Tablet(s) Oral daily  nystatin    Suspension 481569 Unit(s) Oral four times a day  polyethylene glycol 3350 17 Gram(s) Oral daily  predniSONE   Tablet 40 milliGRAM(s) Oral daily  propofol Infusion 10.784 MICROgram(s)/kG/Min (4.73 mL/Hr) IV Continuous <Continuous>  senna 2 Tablet(s) Oral at bedtime  thiamine IVPB 500 milliGRAM(s) IV Intermittent every 8 hours    MEDICATIONS  (PRN):  acetaminophen     Tablet .. 650 milliGRAM(s) Oral every 6 hours PRN Temp greater or equal to 38C (100.4F), Mild Pain (1 - 3)  haloperidol    Injectable 5 milliGRAM(s) IntraMuscular every 6 hours PRN Agitation  heparin   Injectable 4700 Unit(s) IV Push every 6 hours PRN For aPTT less than 40  LORazepam   Injectable 2 milliGRAM(s) IV Push every 2 hours PRN Symptom-triggered: each CIWA -Ar score 8 or GREATER      Vital Signs Last 24 Hrs  T(C): 38.1 (03 Mar 2024 06:00), Max: 38.1 (03 Mar 2024 06:00)  T(F): 100.6 (03 Mar 2024 06:00), Max: 100.6 (03 Mar 2024 06:00)  HR: 61 (03 Mar 2024 08:37) (57 - 74)  BP: 124/71 (03 Mar 2024 08:00) (96/54 - 124/71)  BP(mean): 91 (03 Mar 2024 08:00) (70 - 91)  RR: 15 (03 Mar 2024 08:00) (15 - 54)  SpO2: 99% (03 Mar 2024 08:37) (96% - 100%)    Parameters below as of 03 Mar 2024 08:00  Patient On (Oxygen Delivery Method): ventilator    O2 Concentration (%): 80    PHYSICAL EXAM:    GENERAL: In no apparent distress, well nourished, and hydrated.  HEART: Regular rate and rhythm; No murmurs, rubs, or gallops.  PULMONARY: Intubated. Clear to auscultation and perfusion.  No rales, wheezing, or rhonchi bilaterally.  ABDOMEN: Soft, Nontender, Nondistended; Bowel sounds present  EXTREMITIES:  2+ Peripheral Pulses, No clubbing, cyanosis, or edema  NEUROLOGICAL: Sedated    INTERPRETATION OF TELEMETRY: SR 62 bpm    ECG:  < from: 12 Lead ECG (02.29.24 @ 12:33) >    Ventricular Rate 90 BPM    Atrial Rate 90 BPM    P-R Interval 140 ms    QRS Duration 90 ms    Q-T Interval 404 ms    QTC Calculation(Bazett) 494 ms    P Axis 31 degrees    R Axis -14 degrees    T Axis 4 degrees    Diagnosis Line Sinus rhythm with Premature atrial complexes  Septal infarct , age undetermined  Possible Lateral infarct , age undetermined  Abnormal ECG    < end of copied text >    I&O's Detail    02 Mar 2024 07:01  -  03 Mar 2024 07:00  --------------------------------------------------------  IN:    Dexmedetomidine: 73 mL    Dexmedetomidine: 197.4 mL    FentaNYL: 146 mL    FentaNYL: 204.4 mL    Heparin Infusion: 198 mL    IV PiggyBack: 150 mL    Jevity 1.2: 240 mL    Lactated Ringers: 1800 mL    Lactated Ringers Bolus: 1000 mL    Propofol: 26.4 mL  Total IN: 4035.2 mL    OUT:    Indwelling Catheter - Urethral (mL): 875 mL    Propofol: 0 mL  Total OUT: 875 mL    Total NET: 3160.2 mL      03 Mar 2024 07:01  -  03 Mar 2024 10:49  --------------------------------------------------------  IN:    Dexmedetomidine: 18.3 mL    FentaNYL: 14.6 mL    Heparin Infusion: 6 mL    Lactated Ringers: 75 mL  Total IN: 113.9 mL    OUT:    Propofol: 0 mL  Total OUT: 0 mL    Total NET: 113.9 mL          LABS:                        13.8   15.13 )-----------( 120      ( 03 Mar 2024 04:41 )             40.7     03-03    140  |  108  |  19  ----------------------------<  97  4.3   |  24  |  0.7    Ca    8.1<L>      03 Mar 2024 04:41  Phos  2.6     03-01  Mg     2.3     03-03    TPro  5.3<L>  /  Alb  2.7<L>  /  TBili  1.8<H>  /  DBili  x   /  AST  57<H>  /  ALT  40  /  AlkPhos  97  03-03        PTT - ( 03 Mar 2024 00:45 )  PTT:>200.0 sec  BNP      I&O's Detail    02 Mar 2024 07:01  -  03 Mar 2024 07:00  --------------------------------------------------------  IN:    Dexmedetomidine: 73 mL    Dexmedetomidine: 197.4 mL    FentaNYL: 146 mL    FentaNYL: 204.4 mL    Heparin Infusion: 198 mL    IV PiggyBack: 150 mL    Jevity 1.2: 240 mL    Lactated Ringers: 1800 mL    Lactated Ringers Bolus: 1000 mL    Propofol: 26.4 mL  Total IN: 4035.2 mL    OUT:    Indwelling Catheter - Urethral (mL): 875 mL    Propofol: 0 mL  Total OUT: 875 mL    Total NET: 3160.2 mL      03 Mar 2024 07:01  -  03 Mar 2024 10:49  --------------------------------------------------------  IN:    Dexmedetomidine: 18.3 mL    FentaNYL: 14.6 mL    Heparin Infusion: 6 mL    Lactated Ringers: 75 mL  Total IN: 113.9 mL    OUT:    Propofol: 0 mL  Total OUT: 0 mL    Total NET: 113.9 mL      RADIOLOGY:   Patient is a 64y old  Male who presents with a chief complaint of chest pain and SOB (03 Mar 2024 10:02)      HPI:  64 years old male history of alcohol abuse, hypertension, COPD? ( heavy smoker. acc to pt had PFTs done and uses inhalers at home ), recently diagnosed with hyperthyroidism few weeks ago and started on methimazole 5 mg by PMD presents complaining of palpitations and exertional shortness of breath over the past few weeks.   At my encounter family not at bedside, acc to pt his only complaint is palpitations His HR was ranging 120-130 at home. No chest pain. He has cough and mild SOB which he attributes to smoking 1 pack daily. Otherwise he describes himself as very healthy. Pt is tremelous and restless. He admitted to drinking alcohol  (15-20 drinks of liquor daily). Last drink was earlier this evening.  Patient also reports he does want to stop drinking and try to cut down his alcohol use.    As per family, patient was seen by his cardiologist Dr. Johnson who increased that his metoprolol to 75 mg twice a day which did not improve his heart rate.  Patient had outpatient echocardiogram yesterday and noted to be tachycardic so he was recommended to come to ED for evaluation.  Otherwise denies fever, chills, recent illness, coughing, chest pain, abdominal pain, diarrhea or urinary symptoms.  Denies drug use.    Vitals: T 98.2, , /90, spo2 94% on RA   Labs remarkable for elevated ALT AST, Mg 1.5  Lactate 4 on VBGs   Alcohol level 202   CXR clear   EKG showed sinus tachycardia     s/p valium and ativan in ED  s/p 1 L NS, magnesium   HR improved from 130 to 90s  (29 Feb 2024 01:57)      EP consulted for suspected AF episode while in the ED.     REVIEW OF SYSTEMS    [ ] A ten-point review of systems was otherwise negative except as noted.  [x] Due to altered mental status/intubation, subjective information were not able to be obtained from the patient. History was obtained, to the extent possible, from review of the chart and collateral sources of information.      PAST MEDICAL & SURGICAL HISTORY:  HTN (hypertension)      Alcohol abuse      Hyperthyroidism          Home Medications:      Allergies:    No Known Allergies      FAMILY HISTORY:      SOCIAL HISTORY:  CIGARETTES: Denies  ALCOHOL: YES    MEDICATIONS  (STANDING):  albuterol/ipratropium for Nebulization 3 milliLiter(s) Nebulizer every 6 hours  cefTRIAXone   IVPB 1000 milliGRAM(s) IV Intermittent every 24 hours  cefTRIAXone   IVPB      chlorhexidine 0.12% Liquid 15 milliLiter(s) Oral Mucosa two times a day  chlorhexidine 2% Cloths 1 Application(s) Topical daily  dexMEDEtomidine Infusion 0.054 MICROgram(s)/kG/Hr (0.99 mL/Hr) IV Continuous <Continuous>  fentaNYL   Infusion. 0.539 MICROgram(s)/kG/Hr (3.94 mL/Hr) IV Continuous <Continuous>  folic acid 1 milliGRAM(s) Oral daily  heparin  Infusion. 1000 Unit(s)/Hr (10 mL/Hr) IV Continuous <Continuous>  influenza   Vaccine 0.5 milliLiter(s) IntraMuscular once  lactated ringers Bolus 1000 milliLiter(s) IV Bolus once  lactated ringers. 1000 milliLiter(s) (75 mL/Hr) IV Continuous <Continuous>  LORazepam   Injectable 1 milliGRAM(s) IV Push every 4 hours  LORazepam   Injectable   IV Push   methimazole 5 milliGRAM(s) Oral daily  metoprolol tartrate 75 milliGRAM(s) Oral two times a day  multivitamin 1 Tablet(s) Oral daily  nystatin    Suspension 883121 Unit(s) Oral four times a day  polyethylene glycol 3350 17 Gram(s) Oral daily  predniSONE   Tablet 40 milliGRAM(s) Oral daily  propofol Infusion 10.784 MICROgram(s)/kG/Min (4.73 mL/Hr) IV Continuous <Continuous>  senna 2 Tablet(s) Oral at bedtime  thiamine IVPB 500 milliGRAM(s) IV Intermittent every 8 hours    MEDICATIONS  (PRN):  acetaminophen     Tablet .. 650 milliGRAM(s) Oral every 6 hours PRN Temp greater or equal to 38C (100.4F), Mild Pain (1 - 3)  haloperidol    Injectable 5 milliGRAM(s) IntraMuscular every 6 hours PRN Agitation  heparin   Injectable 4700 Unit(s) IV Push every 6 hours PRN For aPTT less than 40  LORazepam   Injectable 2 milliGRAM(s) IV Push every 2 hours PRN Symptom-triggered: each CIWA -Ar score 8 or GREATER      Vital Signs Last 24 Hrs  T(C): 38.1 (03 Mar 2024 06:00), Max: 38.1 (03 Mar 2024 06:00)  T(F): 100.6 (03 Mar 2024 06:00), Max: 100.6 (03 Mar 2024 06:00)  HR: 61 (03 Mar 2024 08:37) (57 - 74)  BP: 124/71 (03 Mar 2024 08:00) (96/54 - 124/71)  BP(mean): 91 (03 Mar 2024 08:00) (70 - 91)  RR: 15 (03 Mar 2024 08:00) (15 - 54)  SpO2: 99% (03 Mar 2024 08:37) (96% - 100%)    Parameters below as of 03 Mar 2024 08:00  Patient On (Oxygen Delivery Method): ventilator    O2 Concentration (%): 80    PHYSICAL EXAM:    GENERAL: In no apparent distress, well nourished, and hydrated.  HEART: Regular rate and rhythm; No murmurs, rubs, or gallops.  PULMONARY: Intubated. Coarse ant breath sounds  ABDOMEN: Soft, Nontender, Nondistended; Bowel sounds present  EXTREMITIES:  2+ Peripheral Pulses, No clubbing, cyanosis, or edema  NEUROLOGICAL: Sedated    INTERPRETATION OF TELEMETRY: SR 62 bpm    ECG:  < from: 12 Lead ECG (02.29.24 @ 12:33) >    Ventricular Rate 90 BPM    Atrial Rate 90 BPM    P-R Interval 140 ms    QRS Duration 90 ms    Q-T Interval 404 ms    QTC Calculation(Bazett) 494 ms    P Axis 31 degrees    R Axis -14 degrees    T Axis 4 degrees    Diagnosis Line Sinus rhythm with Premature atrial complexes  Septal infarct , age undetermined  Possible Lateral infarct , age undetermined  Abnormal ECG    < end of copied text >    I&O's Detail    02 Mar 2024 07:01  -  03 Mar 2024 07:00  --------------------------------------------------------  IN:    Dexmedetomidine: 73 mL    Dexmedetomidine: 197.4 mL    FentaNYL: 146 mL    FentaNYL: 204.4 mL    Heparin Infusion: 198 mL    IV PiggyBack: 150 mL    Jevity 1.2: 240 mL    Lactated Ringers: 1800 mL    Lactated Ringers Bolus: 1000 mL    Propofol: 26.4 mL  Total IN: 4035.2 mL    OUT:    Indwelling Catheter - Urethral (mL): 875 mL    Propofol: 0 mL  Total OUT: 875 mL    Total NET: 3160.2 mL      03 Mar 2024 07:01  -  03 Mar 2024 10:49  --------------------------------------------------------  IN:    Dexmedetomidine: 18.3 mL    FentaNYL: 14.6 mL    Heparin Infusion: 6 mL    Lactated Ringers: 75 mL  Total IN: 113.9 mL    OUT:    Propofol: 0 mL  Total OUT: 0 mL    Total NET: 113.9 mL          LABS:                        13.8   15.13 )-----------( 120      ( 03 Mar 2024 04:41 )             40.7     03-03    140  |  108  |  19  ----------------------------<  97  4.3   |  24  |  0.7    Ca    8.1<L>      03 Mar 2024 04:41  Phos  2.6     03-01  Mg     2.3     03-03    TPro  5.3<L>  /  Alb  2.7<L>  /  TBili  1.8<H>  /  DBili  x   /  AST  57<H>  /  ALT  40  /  AlkPhos  97  03-03        PTT - ( 03 Mar 2024 00:45 )  PTT:>200.0 sec  BNP      I&O's Detail    02 Mar 2024 07:01  -  03 Mar 2024 07:00  --------------------------------------------------------  IN:    Dexmedetomidine: 73 mL    Dexmedetomidine: 197.4 mL    FentaNYL: 146 mL    FentaNYL: 204.4 mL    Heparin Infusion: 198 mL    IV PiggyBack: 150 mL    Jevity 1.2: 240 mL    Lactated Ringers: 1800 mL    Lactated Ringers Bolus: 1000 mL    Propofol: 26.4 mL  Total IN: 4035.2 mL    OUT:    Indwelling Catheter - Urethral (mL): 875 mL    Propofol: 0 mL  Total OUT: 875 mL    Total NET: 3160.2 mL      03 Mar 2024 07:01  -  03 Mar 2024 10:49  --------------------------------------------------------  IN:    Dexmedetomidine: 18.3 mL    FentaNYL: 14.6 mL    Heparin Infusion: 6 mL    Lactated Ringers: 75 mL  Total IN: 113.9 mL    OUT:    Propofol: 0 mL  Total OUT: 0 mL    Total NET: 113.9 mL      RADIOLOGY:   73 yr old male with PMH of HTN, HLD, CKD stage III, Renal artery stenosis, BCC on face (s/p excision), pt presents to PST for preop evaluation. Pt has c/o cold like symptoms a month ago, found to have abnormal CXR and subsequent CT/PET scan showed AUGIE nodule and mediastinal lymphadenopathy. Pt scheduled for flexible bronchoscopy, endobronchial ultrasound guided biopsy with cytology on 4/9/2024. Pt denies any SOB, wheezing or SOB.

## 2024-04-05 NOTE — H&P PST ADULT - CARDIOVASCULAR COMMENTS
Hx of AA, last echo 3/2024 showed "the aortic valve is trileaflet and appears structurally normal". {copy in chart from allscripts}

## 2024-04-05 NOTE — H&P PST ADULT - PROBLEM SELECTOR PLAN 2
Patient instructed to take Losartan/HCTZ  and Nebivolol with a sip of water on the morning of procedure.

## 2024-04-05 NOTE — H&P PST ADULT - LAST STRESS TEST
Exercise stress test 7/2023 done at cardiologist, No significant ST changes, no significant arrhythmia. Normal Nuclear stress test on 12/2019.

## 2024-04-05 NOTE — H&P PST ADULT - ENMT COMMENTS
MALLAMPATI I Full ROM neck. Denies dentures. Denies loose teeth. c/o intermittent pain on tooth #14, no abscess, dental evaluation done, referred to another dentist to r/o metastasis of lung cancer.

## 2024-04-05 NOTE — H&P PST ADULT - NEGATIVE ENMT SYMPTOMS
no hearing difficulty/no ear pain/no tinnitus/no vertigo/no sinus symptoms/no nasal discharge/no recurrent cold sores/no dysphagia

## 2024-04-05 NOTE — H&P PST ADULT - OTHER CARE PROVIDERS
Carlos Moore Cardiologist [phone]                                                          Dr. Gerry Mandujano Nephrologist [phone]

## 2024-04-05 NOTE — H&P PST ADULT - NSICDXPASTMEDICALHX_GEN_ALL_CORE_FT
PAST MEDICAL HISTORY:  Anxiety and depression     Aortic aneurysm of unspecified site, without rupture     Basal cell carcinoma     H/O aortic aneurysm     HTN (hypertension) 10 years    Hyperlipidemia 5 years    Renal artery stenosis     Stage 3 chronic kidney disease

## 2024-04-05 NOTE — H&P PST ADULT - PROBLEM SELECTOR PLAN 1
Pt scheduled for flexible bronchoscopy, endobronchial ultrasound guided biopsy with cytology on 4/9/2024. Pt denies any SOB, wheezing or SOB on 4/9/2024. Pre-op instructions provided. Pt given verbal and written instructions. Patient verbalized understanding.     CBC and BMP done at PST.  Copy of EKG, Echo and stress done at cardiologist {copy in chart from allscripts}   Instructed the pt to stop ASA last dose 4/5/24.    Hx of Aortic aneurysm, HTN, As per Teresa Gonzalez MD, "Pt is at relatively low cardiac risk for low procedure bronchoscopy and may proceed with procedure on current medical therapy". {copy in chart from allscripts}

## 2024-04-08 ENCOUNTER — TRANSCRIPTION ENCOUNTER (OUTPATIENT)
Age: 74
End: 2024-04-08

## 2024-04-08 ENCOUNTER — APPOINTMENT (OUTPATIENT)
Dept: INTERNAL MEDICINE | Facility: CLINIC | Age: 74
End: 2024-04-08
Payer: MEDICARE

## 2024-04-08 VITALS
RESPIRATION RATE: 16 BRPM | HEART RATE: 68 BPM | DIASTOLIC BLOOD PRESSURE: 81 MMHG | HEIGHT: 69 IN | WEIGHT: 200 LBS | BODY MASS INDEX: 29.62 KG/M2 | SYSTOLIC BLOOD PRESSURE: 140 MMHG

## 2024-04-08 DIAGNOSIS — Z01.818 ENCOUNTER FOR OTHER PREPROCEDURAL EXAMINATION: ICD-10-CM

## 2024-04-08 PROBLEM — F41.9 ANXIETY DISORDER, UNSPECIFIED: Chronic | Status: ACTIVE | Noted: 2024-04-05

## 2024-04-08 PROBLEM — I71.9 AORTIC ANEURYSM OF UNSPECIFIED SITE, WITHOUT RUPTURE: Chronic | Status: ACTIVE | Noted: 2024-04-05

## 2024-04-08 PROBLEM — Z86.79 PERSONAL HISTORY OF OTHER DISEASES OF THE CIRCULATORY SYSTEM: Chronic | Status: ACTIVE | Noted: 2024-04-05

## 2024-04-08 PROBLEM — C44.91 BASAL CELL CARCINOMA OF SKIN, UNSPECIFIED: Chronic | Status: ACTIVE | Noted: 2024-04-05

## 2024-04-08 PROBLEM — I70.1 ATHEROSCLEROSIS OF RENAL ARTERY: Chronic | Status: ACTIVE | Noted: 2024-04-05

## 2024-04-08 PROBLEM — N18.30 CHRONIC KIDNEY DISEASE, STAGE 3 UNSPECIFIED: Chronic | Status: ACTIVE | Noted: 2024-04-05

## 2024-04-08 PROCEDURE — 99214 OFFICE O/P EST MOD 30 MIN: CPT

## 2024-04-08 NOTE — ASSESSMENT
[Patient Optimized for Surgery] : Patient optimized for surgery [No Further Testing Recommended] : no further testing recommended [As per surgery] : as per surgery [FreeTextEntry4] :  Mr. WARNER is a 73 year male with PMH of HTN, CKD 3, who comes to the office as a new patient for preoperative evaluation for flexible bronchoscopy: EBUS with Dr Singh. Patient has greater than 4 METS, is a moderate perioperative risk for Major adverse cardiac event. Blood work reviewed. No PTT, PT/INR on file but patient denies any bleeding disorders. His platelets are 148 but are higher that pervious lab work and has a history of low platelets and denies any bleeding symptoms. Patient saw cardio Dr Chow on 03/13/2024 and was cleared on 04/01/2024 for the procedure per note addendum. No further testing indicated at this time. Patient is medically optimized for this procedure. Would recommend to monitor for bleeding signs and treat accordingly and if needed, transfuse platelets and/or consult heme/onc.   During conversation with patient extensive medical records were reviewed including but not limited to hospital records, out patient records, laboratory data  In addition extensive time was also spent in reviewing diagnostic studies.  Avoid NSAIDs, vitamins and aspirin containing products prior to surgery  Counseling included abnormal lab results, differential diagnoses, treatment options, risks and benefits, lifestyle changes, current condition, medications, and dose adjustments.  The patient was interactive, attentive, asked questions, and verbalized understanding.  Fax this note to PST at [facility]  Follow up after the procedure with his PCP Dr Chi

## 2024-04-08 NOTE — HISTORY OF PRESENT ILLNESS
[No Pertinent Cardiac History] : no history of aortic stenosis, atrial fibrillation, coronary artery disease, recent myocardial infarction, or implantable device/pacemaker [No Pertinent Pulmonary History] : no history of asthma, COPD, sleep apnea, or smoking [No Adverse Anesthesia Reaction] : no adverse anesthesia reaction in self or family member [Chronic Kidney Disease] : chronic kidney disease [(Patient denies any chest pain, claudication, dyspnea on exertion, orthopnea, palpitations or syncope)] : Patient denies any chest pain, claudication, dyspnea on exertion, orthopnea, palpitations or syncope [____ METs%] : [unfilled] METs% [Chronic Anticoagulation] : no chronic anticoagulation [Diabetes] : no diabetes [FreeTextEntry1] : Flexible bronschosopy: EBUS [FreeTextEntry2] : 04/09/2024 [FreeTextEntry3] : Shun Valencia [FreeTextEntry4] : Mr. KAYLEE WARNER is a pleasant 73-year-old male with PMH of HTN, CKD 3, who comes who normally sees Dr Chi and comes to the office as a new patient for preoperative evaluation for flexible bronchoscopy: EBUS with Dr Singh. Patient reports that is able to walk 4 blocks or a flight of stairs without getting chest pain, palpitations or shortness of breath. Denies bleeding or clotting disorders. Patient saw cardio Dr Chow on 03/13/2024 and was cleared on 04/01/2024 for the procedure per note addendum.

## 2024-04-09 ENCOUNTER — APPOINTMENT (OUTPATIENT)
Dept: THORACIC SURGERY | Facility: HOSPITAL | Age: 74
End: 2024-04-09

## 2024-04-09 ENCOUNTER — OUTPATIENT (OUTPATIENT)
Dept: OUTPATIENT SERVICES | Facility: HOSPITAL | Age: 74
LOS: 1 days | Discharge: HOME | End: 2024-04-09
Payer: MEDICARE

## 2024-04-09 ENCOUNTER — TRANSCRIPTION ENCOUNTER (OUTPATIENT)
Age: 74
End: 2024-04-09

## 2024-04-09 ENCOUNTER — RESULT REVIEW (OUTPATIENT)
Age: 74
End: 2024-04-09

## 2024-04-09 VITALS
OXYGEN SATURATION: 97 % | TEMPERATURE: 98 F | WEIGHT: 201.94 LBS | DIASTOLIC BLOOD PRESSURE: 72 MMHG | HEIGHT: 69 IN | HEART RATE: 53 BPM | SYSTOLIC BLOOD PRESSURE: 134 MMHG | RESPIRATION RATE: 16 BRPM

## 2024-04-09 VITALS
DIASTOLIC BLOOD PRESSURE: 71 MMHG | OXYGEN SATURATION: 95 % | RESPIRATION RATE: 16 BRPM | SYSTOLIC BLOOD PRESSURE: 130 MMHG | TEMPERATURE: 98 F | HEART RATE: 50 BPM

## 2024-04-09 DIAGNOSIS — Z98.890 OTHER SPECIFIED POSTPROCEDURAL STATES: Chronic | ICD-10-CM

## 2024-04-09 DIAGNOSIS — R91.1 SOLITARY PULMONARY NODULE: ICD-10-CM

## 2024-04-09 PROCEDURE — 31654 BRONCH EBUS IVNTJ PERPH LES: CPT

## 2024-04-09 PROCEDURE — 31624 DX BRONCHOSCOPE/LAVAGE: CPT

## 2024-04-09 PROCEDURE — 88112 CYTOPATH CELL ENHANCE TECH: CPT | Mod: 26,59

## 2024-04-09 PROCEDURE — 88305 TISSUE EXAM BY PATHOLOGIST: CPT | Mod: 26

## 2024-04-09 PROCEDURE — 71045 X-RAY EXAM CHEST 1 VIEW: CPT | Mod: 26

## 2024-04-09 PROCEDURE — 88173 CYTOPATH EVAL FNA REPORT: CPT | Mod: 26

## 2024-04-09 NOTE — ASU DISCHARGE PLAN (ADULT/PEDIATRIC) - COMMENTS
Follow up with Dr. Hoffmann at his [facility] office  Call [phone] to schedule an appointment. Follow up with Dr. Hoffmann at his [facility] office  Call [phone] to schedule a follow up appointment on 4/30/24.

## 2024-04-09 NOTE — BRIEF OPERATIVE NOTE - ELECTIVE PROCEDURE
[Behavioral health counseling provided] : Behavioral health counseling provided [Engage in a relaxing activity] : Engage in a relaxing activity [None] : None [Needs reinforcement, provided] : Patient needs reinforcement on understanding of lifestyle changes and steps needed to achieve self management goal; reinforcement was provided Yes

## 2024-04-09 NOTE — ASU PREOP CHECKLIST - LATEX ALLERGY
----- Message from Jason Luna sent at 11/27/2023 12:11 PM CST -----  Regarding: call back  Pt's wife call to reschedule appt wife states pt goes to dialysis on tomorrow requesting call back    Call      no

## 2024-04-09 NOTE — ASU DISCHARGE PLAN (ADULT/PEDIATRIC) - NS MD DC FALL RISK RISK
For information on Fall & Injury Prevention, visit: [email or web address] OR  [email or web address] OR  https://www.cdc.gov/steadi/patient.html

## 2024-04-09 NOTE — ASU DISCHARGE PLAN (ADULT/PEDIATRIC) - CARE PROVIDER_API CALL
Erik Hoffmann  Thoracic Surgery  [address] [place] [id number]  Phone: [phone]  Fax: [phone]  Follow Up Time:

## 2024-04-10 RX ORDER — ASPIRIN/CALCIUM CARB/MAGNESIUM 324 MG
1 TABLET ORAL
Qty: 0 | Refills: 0 | DISCHARGE
Start: 2024-04-10

## 2024-04-12 ENCOUNTER — NON-APPOINTMENT (OUTPATIENT)
Age: 74
End: 2024-04-12

## 2024-04-15 LAB — NON-GYNECOLOGICAL CYTOLOGY STUDY: SIGNIFICANT CHANGE UP

## 2024-04-16 ENCOUNTER — APPOINTMENT (OUTPATIENT)
Dept: PULMONOLOGY | Facility: CLINIC | Age: 74
End: 2024-04-16

## 2024-04-30 ENCOUNTER — RESULT REVIEW (OUTPATIENT)
Age: 74
End: 2024-04-30

## 2024-04-30 ENCOUNTER — NON-APPOINTMENT (OUTPATIENT)
Age: 74
End: 2024-04-30

## 2024-04-30 ENCOUNTER — OUTPATIENT (OUTPATIENT)
Dept: OUTPATIENT SERVICES | Facility: HOSPITAL | Age: 74
LOS: 1 days | End: 2024-04-30
Payer: MEDICARE

## 2024-04-30 ENCOUNTER — APPOINTMENT (OUTPATIENT)
Dept: RADIOLOGY | Facility: HOSPITAL | Age: 74
End: 2024-04-30

## 2024-04-30 ENCOUNTER — TRANSCRIPTION ENCOUNTER (OUTPATIENT)
Age: 74
End: 2024-04-30

## 2024-04-30 ENCOUNTER — APPOINTMENT (OUTPATIENT)
Dept: THORACIC SURGERY | Facility: CLINIC | Age: 74
End: 2024-04-30
Payer: MEDICARE

## 2024-04-30 VITALS
WEIGHT: 202 LBS | RESPIRATION RATE: 17 BRPM | BODY MASS INDEX: 29.92 KG/M2 | HEART RATE: 54 BPM | OXYGEN SATURATION: 97 % | HEIGHT: 69 IN | SYSTOLIC BLOOD PRESSURE: 149 MMHG | DIASTOLIC BLOOD PRESSURE: 83 MMHG

## 2024-04-30 DIAGNOSIS — R91.1 SOLITARY PULMONARY NODULE: ICD-10-CM

## 2024-04-30 PROCEDURE — 71046 X-RAY EXAM CHEST 2 VIEWS: CPT | Mod: 26

## 2024-04-30 PROCEDURE — 99214 OFFICE O/P EST MOD 30 MIN: CPT

## 2024-04-30 NOTE — HISTORY OF PRESENT ILLNESS
[FreeTextEntry1] : Mr. KAYLEE WARNER, 73 year old male, former smoker, w/ hx of HTN, HLD, Depression, CKD Stage III, BCC of face, who c/o chest congestion, nasal congestion and cough, went to urgent care, found to have abnormal CXR, was given a course of ABX and steroid. Subsequent CT and PET showed AUGIE nodule and mediastinal lymphadenopathy. Referred by [facility] for evaluation.   CT chest on 03/07/2024: (ZP) - There is a lobulated spiculated 2.2 cm lesion in the left upper lobe abutting the major fissure (series 3 image 195), and a 6 mm right upper lobe nodule on the saved.  PFTs on 03/12/24: FVC 3.58, 89%; FEV1 3.09, 102%; DLCO 26.0, 107%  PET/CT on 03/18/2024: (ZP) - There is intense FDG activity corresponding with a left upper lobe mass (SUV 12.1, 2.0 x 2.0 cm, series 3 image 101). This corresponds with the nodule described in this region on the recent CT chest of 3/7/2024. Malignancy is suspected.  - There are intensely FDG avid mediastinal and bilateral hilar lymph nodes. For example a left subcarinal node (SUV 8.0, 1.7 x 1.0 cm, series 3 image 97); a left hilar node (SUV 6.4); and in the right hilar region (SUV 3.7). Metastatic lymphadenopathy is suspected.   On 03/26/2024, AUGIE nodule is suspicious for malignancy. Mediastinal lymphadenopathy could be reactive/metastatic disease. We discussed surgical biopsy for the LNs first. If LNs are reactive, will discuss lung resection in the near future. Will schedule for Flexible Bronchoscopy, Endobronchial ultrasound biopsy on 04/09/2024. Obtain PFTs from Dr. Roddy Choi.   Patient is s/p Flexible bronchoscopy with bronchial lavage and then a bronchial ultrasound on 04/09/2024. *Cytopathology of subcarinal, paratracheal (left and right) is non-diagnostic.  *Cytopathology of BAL is negative for malignant cells. Thinprep show moderate cellularity composed of groups of uniform ciliated bronchial epithelial cells, scattered alveolar macrophages and mixed inflammatory cells.   Patient is here today for a follow up.

## 2024-04-30 NOTE — DATA REVIEWED
[FreeTextEntry1] : I have independently reviewed the following: PFTs on 03/12/24 Cytopathology from 04/09/24

## 2024-04-30 NOTE — ASSESSMENT
[FreeTextEntry1] : Mr. KAYLEE WARNER, 73 year old male, former smoker, w/ hx of HTN, HLD, Depression, CKD Stage III, BCC of face, who c/o chest congestion, nasal congestion and cough, went to urgent care, found to have abnormal CXR, was given a course of ABX and steroid. Subsequent CT and PET showed AUGIE nodule and mediastinal lymphadenopathy. Referred by [facility] for evaluation.   Patient is s/p Flexible bronchoscopy with bronchial lavage and then a bronchial ultrasound on 04/09/2024. *Cytopathology of subcarinal, paratracheal (left and right) is non-diagnostic.  *Cytopathology of BAL is negative for malignant cells. Thinprep show moderate cellularity composed of groups of uniform ciliated bronchial epithelial cells, scattered alveolar macrophages and mixed inflammatory cells.   I have reviewed the patient's medical records and diagnostic images at time of this office consultation and have made the following recommendation: 1. Path reviewed and explained to patient, subcarinal, paratracheal (left and right) is non-diagnostic. Will proceed with lung resection. Will schedule for FB, Left VATS, RA, Lung resection on 05/21/2024. The risks benefits and alternatives of the procedure were explained to the patient. All of his questions were answered, and patient freely consented to the procedure.  2. Updated cardiac clearance and PST.   I, AROLDO Rhodes, personally performed the evaluation and management (E/M) services for this established patient who follow up today with an existing condition.  That E/M includes conducting the examination, assessing all new/exacerbated/existing conditions, and establishing a plan of care.  Today, my ACP, CONY Owusu, was here to observe my evaluation and management services for this existing condition to be followed going forward.

## 2024-05-02 ENCOUNTER — NON-APPOINTMENT (OUTPATIENT)
Age: 74
End: 2024-05-02

## 2024-05-14 ENCOUNTER — OUTPATIENT (OUTPATIENT)
Dept: OUTPATIENT SERVICES | Facility: HOSPITAL | Age: 74
LOS: 1 days | End: 2024-05-14

## 2024-05-14 VITALS
WEIGHT: 205.03 LBS | OXYGEN SATURATION: 98 % | SYSTOLIC BLOOD PRESSURE: 137 MMHG | DIASTOLIC BLOOD PRESSURE: 74 MMHG | RESPIRATION RATE: 16 BRPM | TEMPERATURE: 98 F | HEART RATE: 53 BPM | HEIGHT: 70 IN

## 2024-05-14 DIAGNOSIS — R91.1 SOLITARY PULMONARY NODULE: ICD-10-CM

## 2024-05-14 DIAGNOSIS — Z98.890 OTHER SPECIFIED POSTPROCEDURAL STATES: Chronic | ICD-10-CM

## 2024-05-14 LAB
A1C WITH ESTIMATED AVERAGE GLUCOSE RESULT: 6.1 % — HIGH (ref 4–5.6)
BLD GP AB SCN SERPL QL: NEGATIVE — SIGNIFICANT CHANGE UP
ESTIMATED AVERAGE GLUCOSE: 128 — SIGNIFICANT CHANGE UP
RH IG SCN BLD-IMP: POSITIVE — SIGNIFICANT CHANGE UP
RH IG SCN BLD-IMP: POSITIVE — SIGNIFICANT CHANGE UP

## 2024-05-14 RX ORDER — SODIUM CHLORIDE 9 MG/ML
1000 INJECTION INTRAMUSCULAR; INTRAVENOUS; SUBCUTANEOUS
Refills: 0 | Status: DISCONTINUED | OUTPATIENT
Start: 2024-05-21 | End: 2024-05-22

## 2024-05-14 NOTE — H&P PST ADULT - NSANTHOSAYNRD_GEN_A_CORE
No. RIVERA screening performed.  STOP BANG Legend: 0-2 = LOW Risk; 3-4 = INTERMEDIATE Risk; 5-8 = HIGH Risk never tested/No. RIVERA screening performed.  STOP BANG Legend: 0-2 = LOW Risk; 3-4 = INTERMEDIATE Risk; 5-8 = HIGH Risk

## 2024-05-14 NOTE — H&P PST ADULT - LAST ECHOCARDIOGRAM
3/13/2024 done at cardiologist, Left ventricular systolic function is hyperdynamic with EF od >70%, 3/13/2024 done at cardiologist, Left ventricular systolic function is hyperdynamic with EF od >70%, in allscripts 3/13/2024 done at cardiologist,

## 2024-05-14 NOTE — H&P PST ADULT - FUNCTIONAL STATUS
DASI 9.89 Walks 1 to 2 blocks, climbs 1 flight of stairs, ADLs DASI 9.89,  Walks 1 to 2 blocks, climbs 1 flight of stairs, ADLs

## 2024-05-14 NOTE — H&P PST ADULT - CARDIOVASCULAR COMMENTS
Hx of AA, last echo 3/2024 showed "the aortic valve is trileaflet and appears structurally normal". {copy in chart from allscripts} Hx of AA, last echo 3/2024 showed "the aortic valve is trileaflet and appears structurally normal". Hx of AA, last echo 3/2024

## 2024-05-14 NOTE — H&P PST ADULT - PROBLEM SELECTOR PLAN 1
Pt scheduled for flexible bronchoscopy, endobronchial ultrasound guided biopsy with cytology on 4/9/2024. Pt denies any SOB, wheezing or SOB on 4/9/2024. Pre-op instructions provided. Pt given verbal and written instructions. Patient verbalized understanding.     CBC and BMP done at PST.  Copy of EKG, Echo and stress done at cardiologist {copy in chart from allscripts}   Instructed the pt to stop ASA last dose 4/5/24.    Hx of Aortic aneurysm, HTN, As per Teresa Gonzalez MD, "Pt is at relatively low cardiac risk for low procedure bronchoscopy and may proceed with procedure on current medical therapy". {copy in chart from allscripts} 73 yr old male with PMH of HTN, HLD, CKD stage III, Renal artery stenosis, BCC on face (s/p excision), presents to PST for preop evaluation to have Flexible bronchoscopy, left VATS, robotic assisted lung resection on 5/21/24.     CBC and BMP done on 5/6/24 in chart, A1C, TYPE, AND ABO done at PST.   EKG, Echo and stress done at cardiologist in  allscripts}     Instructed the pt to stop ASA and multivitamin on 5/14/24. . 73 yr old male with PMH of HTN, HLD, CKD stage III, Renal artery stenosis, BCC on face (s/p excision), presents to PST for preop evaluation to have Flexible bronchoscopy, left VATS, robotic assisted lung resection on 5/21/24.     CBC and BMP done on 5/6/24 in chart, A1C, TYPE, AND ABO done at PST.   EKG, Echo and stress done at cardiologist  - will obtain copy    Instructed the pt to stop ASA and multivitamin on 5/14/24. .

## 2024-05-14 NOTE — H&P PST ADULT - OTHER CARE PROVIDERS
Carlos Moore Cardiologist [phone]                                                          Dr. Gerry Mandujano Nephrologist [phone] Carlos Moore -  Cardiologist -  [phone] ,  Dr. Gerry Mandujano -  Nephrologist -  [phone]

## 2024-05-14 NOTE — H&P PST ADULT - NEGATIVE MUSCULOSKELETAL SYMPTOMS
no arthralgia/no muscle cramps/no muscle weakness/no stiffness/no neck pain no muscle cramps/no muscle weakness/no stiffness/no neck pain

## 2024-05-14 NOTE — H&P PST ADULT - LAST STRESS TEST
Exercise stress test 7/2023 done at cardiologist, No significant ST changes, no significant arrhythmia. Normal Nuclear stress test on 12/2019. Exercise stress test 7/2023 done at cardiologist, No significant ST changes, no significant arrhythmia. Normal Nuclear stress test on 12/2019. in allscripts Exercise stress test 7/2023 done at cardiologist,

## 2024-05-14 NOTE — H&P PST ADULT - ENMT COMMENTS
Denies dentures. Denies loose teeth. c/o intermittent pain on tooth #14, no abscess, dental evaluation done, referred to another dentist to r/o metastasis of lung cancer. MALLAMPATI I Full ROM neck. Denies dentures. Denies loose teeth. , MALLAMPATI II Full ROM neck.

## 2024-05-14 NOTE — H&P PST ADULT - NSICDXPASTMEDICALHX_GEN_ALL_CORE_FT
PAST MEDICAL HISTORY:  Anxiety and depression     Aortic aneurysm of unspecified site, without rupture     Basal cell carcinoma     H/O aortic aneurysm     HTN (hypertension) 10 years    Hyperlipidemia 5 years    Renal artery stenosis     Stage 3 chronic kidney disease      PAST MEDICAL HISTORY:  Anxiety and depression     Aortic aneurysm of unspecified site, without rupture     Basal cell carcinoma     H/O aortic aneurysm     History of BPH     HTN (hypertension) 10 years    Hyperlipidemia 5 years    Renal artery stenosis     Renal calculi     Stage 3 chronic kidney disease

## 2024-05-14 NOTE — H&P PST ADULT - NSICDXPASTSURGICALHX_GEN_ALL_CORE_FT
PAST SURGICAL HISTORY:  H/O basal cell carcinoma excision     S/P rotator cuff surgery      PAST SURGICAL HISTORY:  H/O basal cell carcinoma excision     History of bronchoscopy     S/P rotator cuff surgery

## 2024-05-14 NOTE — H&P PST ADULT - HISTORY OF PRESENT ILLNESS
73 yr old male with PMH of HTN, HLD, CKD stage III, Renal artery stenosis, BCC on face (s/p excision), pt presents to PST for preop evaluation. Pt has c/o cold like symptoms a month ago, found to have abnormal CXR and subsequent CT/PET scan showed AUGIE nodule and mediastinal lymphadenopathy. Pt scheduled for flexible bronchoscopy, endobronchial ultrasound guided biopsy with cytology on 4/9/2024. Pt denies any SOB, wheezing or SOB. 73 yr old male with PMH of HTN, HLD, CKD stage III, Renal artery stenosis, BCC on face (s/p excision), presents to PST for preop evaluation to have Flexible bronchoscopy, left VATS, robotic assisted lung resection on 5/21/24.    Pt stated that he had cold like symptoms a month ago, found to have abnormal CXR and subsequent CT/PET scan showed AUGIE nodule and mediastinal lymphadenopathy. Pt had flexible bronchoscopy, endobronchial ultrasound guided biopsy with cytology on 4/9/2024.   Pt denies any SOB, wheezing or SOB.

## 2024-05-14 NOTE — H&P PST ADULT - PRIMARY CARE PROVIDER
Damien SLY Chi MD [facility] [phone] Dr. Damien Chi MD - [facility] - [phone]

## 2024-05-14 NOTE — H&P PST ADULT - PROBLEM SELECTOR PLAN 3
RIVERA precautions. Intermediate risk for RIVERA identified by screening tool.   Airway precautions recommended.

## 2024-05-15 ENCOUNTER — APPOINTMENT (OUTPATIENT)
Dept: INTERNAL MEDICINE | Facility: CLINIC | Age: 74
End: 2024-05-15
Payer: MEDICARE

## 2024-05-15 VITALS
DIASTOLIC BLOOD PRESSURE: 70 MMHG | BODY MASS INDEX: 29.92 KG/M2 | HEART RATE: 61 BPM | SYSTOLIC BLOOD PRESSURE: 130 MMHG | WEIGHT: 202 LBS | HEIGHT: 69 IN | OXYGEN SATURATION: 98 %

## 2024-05-15 DIAGNOSIS — N17.9 ACUTE KIDNEY FAILURE, UNSPECIFIED: ICD-10-CM

## 2024-05-15 DIAGNOSIS — E78.5 HYPERLIPIDEMIA, UNSPECIFIED: ICD-10-CM

## 2024-05-15 DIAGNOSIS — N18.9 ACUTE KIDNEY FAILURE, UNSPECIFIED: ICD-10-CM

## 2024-05-15 DIAGNOSIS — I10 ESSENTIAL (PRIMARY) HYPERTENSION: ICD-10-CM

## 2024-05-15 DIAGNOSIS — N18.9 CHRONIC KIDNEY DISEASE, UNSPECIFIED: ICD-10-CM

## 2024-05-15 PROBLEM — Z87.438 PERSONAL HISTORY OF OTHER DISEASES OF MALE GENITAL ORGANS: Chronic | Status: ACTIVE | Noted: 2024-05-14

## 2024-05-15 PROBLEM — N20.0 CALCULUS OF KIDNEY: Chronic | Status: ACTIVE | Noted: 2024-05-14

## 2024-05-15 PROCEDURE — 99214 OFFICE O/P EST MOD 30 MIN: CPT

## 2024-05-15 PROCEDURE — 36415 COLL VENOUS BLD VENIPUNCTURE: CPT

## 2024-05-15 NOTE — HISTORY OF PRESENT ILLNESS
[FreeTextEntry1] : follow up [de-identified] : 72 y/o male with pmhx of anxiety/depression, HLD, HTN, CKD, AAA, thrombocytopenia presents for follow up.  For history of HTN and HLD: He follows with cardiology (Dr. Moore) and is on atorvastatin and vascepa. For bp patient is currently on bystolic 5mg daily and losartan-hctz 100-12.5mg daily.  For CKD: He is seeing his nephrologist Dr. Mandujano for his CKD For elevated PSA: saw urology, states he was told everything was normal and psa elevation was likely due to age and will continue to monitor  Patient with recent diagnosis of a left upper lobe nodule that was suspicious for malignancy with mediastinal lymphadenopathy concerning for metastatic disease.  Patient underwent flexible bronchoscopy with bronchial lavage and bronchial ultrasound on 4/29/2024 and subcarinal, paratracheal (left and right) is non-diagnostic. Will be going for a FB, left VATS, RA lung resection on 5/21/24 with Dr. Hoffmann  Patient was recently seen at [facility] on 5/6/2024 and found to have likely passed a kidney stone.  Patient's creatinine was slightly elevated from baseline at that time.

## 2024-05-15 NOTE — ASSESSMENT
[FreeTextEntry1] : HTN: BP stable at todays visit -will continue current medication regimen of losartan-hctz 100-12.5mg daily, bystolic 5mg daily -patient to follow up in 6 months or as needed.  HLD: -continue atorvastatin 20mg daily and vascepa  CKD - with recent FESTUS in setting of nephrolithiasis will repeat bmp today f/u with nephro  Lung nodule with mediastinal lymphadenopathy patient to undergo FB, left VATS, RA lung resection on 5/21/24 with Dr. Hoffmann  Patient brought up that he had requested a chest x-ray to be ordered back at his appointment in October which was denied by myself.  Discussed that a chest x-ray was not indicated at his appointment in October as he was not having any symptoms and it is not recommended to perform routine chest x-rays for screening for lung cancer due to the risks including exposure to radiation unnecessarily.  Patient did not qualify for lung cancer screening with CT chest as he only smoked from ages [age] years old

## 2024-05-16 LAB
ANION GAP SERPL CALC-SCNC: 14 MMOL/L
BUN SERPL-MCNC: 22 MG/DL
CALCIUM SERPL-MCNC: 10 MG/DL
CHLORIDE SERPL-SCNC: 102 MMOL/L
CO2 SERPL-SCNC: 25 MMOL/L
CREAT SERPL-MCNC: 1.43 MG/DL
EGFR: 52 ML/MIN/1.73M2
GLUCOSE SERPL-MCNC: 128 MG/DL
POTASSIUM SERPL-SCNC: 4.2 MMOL/L
SODIUM SERPL-SCNC: 141 MMOL/L

## 2024-05-20 NOTE — ASU PATIENT PROFILE, ADULT - NSICDXPASTMEDICALHX_GEN_ALL_CORE_FT
PAST MEDICAL HISTORY:  Anxiety and depression     Aortic aneurysm of unspecified site, without rupture     Basal cell carcinoma     H/O aortic aneurysm     History of BPH     HTN (hypertension) 10 years    Hyperlipidemia 5 years    Renal artery stenosis     Renal calculi     Stage 3 chronic kidney disease

## 2024-05-20 NOTE — ASU PATIENT PROFILE, ADULT - NSICDXPASTSURGICALHX_GEN_ALL_CORE_FT
PAST SURGICAL HISTORY:  H/O basal cell carcinoma excision     History of bronchoscopy     S/P rotator cuff surgery

## 2024-05-21 ENCOUNTER — TRANSCRIPTION ENCOUNTER (OUTPATIENT)
Age: 74
End: 2024-05-21

## 2024-05-21 ENCOUNTER — RESULT REVIEW (OUTPATIENT)
Age: 74
End: 2024-05-21

## 2024-05-21 ENCOUNTER — APPOINTMENT (OUTPATIENT)
Dept: THORACIC SURGERY | Facility: HOSPITAL | Age: 74
End: 2024-05-21

## 2024-05-21 ENCOUNTER — INPATIENT (INPATIENT)
Facility: HOSPITAL | Age: 74
LOS: 1 days | Discharge: HOME | End: 2024-05-23
Attending: THORACIC SURGERY (CARDIOTHORACIC VASCULAR SURGERY) | Admitting: THORACIC SURGERY (CARDIOTHORACIC VASCULAR SURGERY)
Payer: MEDICARE

## 2024-05-21 VITALS
HEIGHT: 70 IN | RESPIRATION RATE: 16 BRPM | TEMPERATURE: 98 F | OXYGEN SATURATION: 98 % | SYSTOLIC BLOOD PRESSURE: 134 MMHG | HEART RATE: 52 BPM | WEIGHT: 205.03 LBS | DIASTOLIC BLOOD PRESSURE: 73 MMHG

## 2024-05-21 DIAGNOSIS — Z98.890 OTHER SPECIFIED POSTPROCEDURAL STATES: Chronic | ICD-10-CM

## 2024-05-21 DIAGNOSIS — R91.1 SOLITARY PULMONARY NODULE: ICD-10-CM

## 2024-05-21 PROCEDURE — 88360 TUMOR IMMUNOHISTOCHEM/MANUAL: CPT | Mod: 26

## 2024-05-21 PROCEDURE — 88313 SPECIAL STAINS GROUP 2: CPT | Mod: 26

## 2024-05-21 PROCEDURE — 71045 X-RAY EXAM CHEST 1 VIEW: CPT | Mod: 26

## 2024-05-21 PROCEDURE — 88305 TISSUE EXAM BY PATHOLOGIST: CPT | Mod: 26

## 2024-05-21 PROCEDURE — 31622 DX BRONCHOSCOPE/WASH: CPT

## 2024-05-21 PROCEDURE — 32669 THORACOSCOPY REMOVE SEGMENT: CPT | Mod: LT

## 2024-05-21 PROCEDURE — S2900 ROBOTIC SURGICAL SYSTEM: CPT | Mod: NC

## 2024-05-21 PROCEDURE — 32674 THORACOSCOPY LYMPH NODE EXC: CPT

## 2024-05-21 PROCEDURE — 99233 SBSQ HOSP IP/OBS HIGH 50: CPT

## 2024-05-21 PROCEDURE — 88309 TISSUE EXAM BY PATHOLOGIST: CPT | Mod: 26

## 2024-05-21 DEVICE — STAPLER COVIDIEN TRI-STAPLE 45MM PURPLE RELOAD: Type: IMPLANTABLE DEVICE | Status: FUNCTIONAL

## 2024-05-21 DEVICE — PROGEL PLEURAL AIR LEAK 4ML: Type: IMPLANTABLE DEVICE | Status: FUNCTIONAL

## 2024-05-21 DEVICE — CHEST DRAIN THORACIC ARGYLE PVC 24FR STRAIGHT: Type: IMPLANTABLE DEVICE | Status: FUNCTIONAL

## 2024-05-21 DEVICE — SURGICEL FIBRILLAR 2 X 4": Type: IMPLANTABLE DEVICE | Status: FUNCTIONAL

## 2024-05-21 DEVICE — STAPLER COVIDIEN TRI-STAPLE CURVED 45MM TAN RELOAD: Type: IMPLANTABLE DEVICE | Status: FUNCTIONAL

## 2024-05-21 DEVICE — STAPLER COVIDIEN TRI-STAPLE 60MM PURPLE RELOAD: Type: IMPLANTABLE DEVICE | Status: FUNCTIONAL

## 2024-05-21 DEVICE — SURGICEL 2 X 14": Type: IMPLANTABLE DEVICE | Status: FUNCTIONAL

## 2024-05-21 RX ORDER — HYDROMORPHONE HYDROCHLORIDE 2 MG/ML
0.5 INJECTION INTRAMUSCULAR; INTRAVENOUS; SUBCUTANEOUS
Refills: 0 | Status: DISCONTINUED | OUTPATIENT
Start: 2024-05-21 | End: 2024-05-22

## 2024-05-21 RX ORDER — SENNA PLUS 8.6 MG/1
2 TABLET ORAL AT BEDTIME
Refills: 0 | Status: DISCONTINUED | OUTPATIENT
Start: 2024-05-21 | End: 2024-05-23

## 2024-05-21 RX ORDER — ACETAMINOPHEN 500 MG
1000 TABLET ORAL EVERY 6 HOURS
Refills: 0 | Status: COMPLETED | OUTPATIENT
Start: 2024-05-21 | End: 2024-05-22

## 2024-05-21 RX ORDER — ONDANSETRON 8 MG/1
4 TABLET, FILM COATED ORAL EVERY 6 HOURS
Refills: 0 | Status: DISCONTINUED | OUTPATIENT
Start: 2024-05-21 | End: 2024-05-22

## 2024-05-21 RX ORDER — HYDROMORPHONE HYDROCHLORIDE 2 MG/ML
30 INJECTION INTRAMUSCULAR; INTRAVENOUS; SUBCUTANEOUS
Refills: 0 | Status: DISCONTINUED | OUTPATIENT
Start: 2024-05-21 | End: 2024-05-22

## 2024-05-21 RX ORDER — HEPARIN SODIUM 5000 [USP'U]/ML
5000 INJECTION INTRAVENOUS; SUBCUTANEOUS EVERY 8 HOURS
Refills: 0 | Status: DISCONTINUED | OUTPATIENT
Start: 2024-05-21 | End: 2024-05-23

## 2024-05-21 RX ORDER — NALOXONE HYDROCHLORIDE 4 MG/.1ML
0.1 SPRAY NASAL
Refills: 0 | Status: DISCONTINUED | OUTPATIENT
Start: 2024-05-21 | End: 2024-05-22

## 2024-05-21 RX ORDER — METOCLOPRAMIDE HCL 10 MG
10 TABLET ORAL ONCE
Refills: 0 | Status: COMPLETED | OUTPATIENT
Start: 2024-05-21 | End: 2024-05-21

## 2024-05-21 RX ORDER — ALBUTEROL 90 UG/1
2.5 AEROSOL, METERED ORAL EVERY 6 HOURS
Refills: 0 | Status: DISCONTINUED | OUTPATIENT
Start: 2024-05-21 | End: 2024-05-23

## 2024-05-21 RX ORDER — POLYETHYLENE GLYCOL 3350 17 G/17G
17 POWDER, FOR SOLUTION ORAL DAILY
Refills: 0 | Status: DISCONTINUED | OUTPATIENT
Start: 2024-05-21 | End: 2024-05-23

## 2024-05-21 RX ORDER — HEPARIN SODIUM 5000 [USP'U]/ML
5000 INJECTION INTRAVENOUS; SUBCUTANEOUS ONCE
Refills: 0 | Status: COMPLETED | OUTPATIENT
Start: 2024-05-21 | End: 2024-05-21

## 2024-05-21 RX ORDER — BACITRACIN ZINC 500 UNIT/G
1 OINTMENT IN PACKET (EA) TOPICAL DAILY
Refills: 0 | Status: DISCONTINUED | OUTPATIENT
Start: 2024-05-21 | End: 2024-05-23

## 2024-05-21 RX ORDER — ATORVASTATIN CALCIUM 80 MG/1
20 TABLET, FILM COATED ORAL AT BEDTIME
Refills: 0 | Status: DISCONTINUED | OUTPATIENT
Start: 2024-05-21 | End: 2024-05-23

## 2024-05-21 RX ORDER — ASPIRIN/CALCIUM CARB/MAGNESIUM 324 MG
81 TABLET ORAL DAILY
Refills: 0 | Status: DISCONTINUED | OUTPATIENT
Start: 2024-05-21 | End: 2024-05-23

## 2024-05-21 RX ORDER — ACETAMINOPHEN 500 MG
975 TABLET ORAL ONCE
Refills: 0 | Status: COMPLETED | OUTPATIENT
Start: 2024-05-21 | End: 2024-05-21

## 2024-05-21 RX ORDER — LIDOCAINE 4 G/100G
1 CREAM TOPICAL
Refills: 0 | Status: DISCONTINUED | OUTPATIENT
Start: 2024-05-21 | End: 2024-05-23

## 2024-05-21 RX ADMIN — HEPARIN SODIUM 5000 UNIT(S): 5000 INJECTION INTRAVENOUS; SUBCUTANEOUS at 14:56

## 2024-05-21 RX ADMIN — Medication 1000 MILLIGRAM(S): at 00:05

## 2024-05-21 RX ADMIN — ALBUTEROL 2.5 MILLIGRAM(S): 90 AEROSOL, METERED ORAL at 20:10

## 2024-05-21 RX ADMIN — Medication 975 MILLIGRAM(S): at 07:15

## 2024-05-21 RX ADMIN — ALBUTEROL 2.5 MILLIGRAM(S): 90 AEROSOL, METERED ORAL at 15:33

## 2024-05-21 RX ADMIN — SODIUM CHLORIDE 30 MILLILITER(S): 9 INJECTION INTRAMUSCULAR; INTRAVENOUS; SUBCUTANEOUS at 12:23

## 2024-05-21 RX ADMIN — Medication 1 APPLICATION(S): at 21:05

## 2024-05-21 RX ADMIN — HEPARIN SODIUM 5000 UNIT(S): 5000 INJECTION INTRAVENOUS; SUBCUTANEOUS at 21:03

## 2024-05-21 RX ADMIN — ONDANSETRON 4 MILLIGRAM(S): 8 TABLET, FILM COATED ORAL at 15:16

## 2024-05-21 RX ADMIN — LIDOCAINE 1 PATCH: 4 CREAM TOPICAL at 19:19

## 2024-05-21 RX ADMIN — Medication 400 MILLIGRAM(S): at 13:00

## 2024-05-21 RX ADMIN — HYDROMORPHONE HYDROCHLORIDE 30 MILLILITER(S): 2 INJECTION INTRAMUSCULAR; INTRAVENOUS; SUBCUTANEOUS at 19:22

## 2024-05-21 RX ADMIN — Medication 400 MILLIGRAM(S): at 17:45

## 2024-05-21 RX ADMIN — LIDOCAINE 1 PATCH: 4 CREAM TOPICAL at 17:45

## 2024-05-21 RX ADMIN — Medication 400 MILLIGRAM(S): at 23:54

## 2024-05-21 RX ADMIN — SENNA PLUS 2 TABLET(S): 8.6 TABLET ORAL at 21:02

## 2024-05-21 RX ADMIN — HYDROMORPHONE HYDROCHLORIDE 0.5 MILLIGRAM(S): 2 INJECTION INTRAMUSCULAR; INTRAVENOUS; SUBCUTANEOUS at 12:20

## 2024-05-21 RX ADMIN — HEPARIN SODIUM 5000 UNIT(S): 5000 INJECTION INTRAVENOUS; SUBCUTANEOUS at 07:15

## 2024-05-21 RX ADMIN — SODIUM CHLORIDE 30 MILLILITER(S): 9 INJECTION INTRAMUSCULAR; INTRAVENOUS; SUBCUTANEOUS at 19:26

## 2024-05-21 RX ADMIN — Medication 1000 MILLIGRAM(S): at 13:30

## 2024-05-21 RX ADMIN — Medication 10 MILLIGRAM(S): at 21:16

## 2024-05-21 RX ADMIN — HYDROMORPHONE HYDROCHLORIDE 0.5 MILLIGRAM(S): 2 INJECTION INTRAMUSCULAR; INTRAVENOUS; SUBCUTANEOUS at 12:00

## 2024-05-21 RX ADMIN — ATORVASTATIN CALCIUM 20 MILLIGRAM(S): 80 TABLET, FILM COATED ORAL at 21:01

## 2024-05-21 NOTE — PROGRESS NOTE ADULT - SUBJECTIVE AND OBJECTIVE BOX
CHIEF COMPLAINT: FOLLOW UP IN ICU FOR POSTOPERATIVE CARE OF PATIENT WHO IS S/P LUNG RESECTION      PROCEDURES:   - L VATS, robotic lingulectomy, MLND 21-May-2024  - Bronchoscopy, EBUS, needle biopsy of mediastinal lymph nodes, and bronchoalveolar lavage 09-Apr-2024    ISSUES:   Lung nodule  Post op pain  Chest tube in place  CKD stage 3  Renal artery stenosis  Thoracic aortic aneurysm   HTN  HLD  Anxiety  Depression  Hx of renal calculi   Hx of basal cell carcinoma of face s/p excision   Former smoker      INTERVAL EVENTS:   OR today. Extubated in OR. Transferred to CTICU.      HISTORY:   Patient reports moderate pain at chest wall incision sites which is worse with coughing and deep breathing without associated fever or dyspnea. Pain is improved with use of pain meds.     PHYSICAL EXAM:   Gen: Comfortable, No acute distress  Eyes: Sclera white, Conjunctiva normal, Eyelids normal, Pupils symmetrical   ENT: Mucous membranes moist,  ,  ,    Neck: Trachea midline,  ,  ,  ,  ,  ,    CV: Rate regular, Rhythm regular,  ,  ,    Resp: Breath sounds clear, No accessory muscles use, L chest tube in place,  ,    Abd: Soft, Non-distended, Non-tender,   ,  ,  ,    Skin: Warm, No peripheral edema of lower extremities,  ,    : No casiano  Neuro: Moving all 4 extremities,    Psych: A&Ox3      ASSESSMENT AND PLAN:     NEURO:  Post-operative Pain - Stable. Pain control with PCA and Tylenol IV PRN.        RESPIRATORY:  Hypoxia - Wean nasal cannula for goal O2sat above 92. Obtain CXR. Incentive spirometry. Chest PT and frequent suctioning. Continue bronchodilators. OOB to chair & ambulate w/ assistance. Continuous pulse oximetry for support & to prevent decompensation.       Chest tube – Pleurevac regulated suctioning. Monitor chest tube output.               CARDIOVASCULAR:  Hemodynamically stable - Not on pressors. Continue hemodynamic monitoring.  Telemetry (medical test) - Reviewed by me today independently. Normal sinus rhythm.    HTN - stable. Hold home antihypertensives for now.     Renal artery stenosis - stable.  - Continue aspirin PO     Thoracic aortic aneurysm - stable  - Maintain BP control      RENAL:  CKD stage 3 – Stable. Renally dose medications. Monitor for hyperkalemia and uremia. Avoid nephrotoxic medications. Monitor IOs and electrolytes.              GASTROINTESTINAL:  GI prophylaxis not indicated  Zofran and Reglan IV PRN for nausea  Regular consistency diet            HEMATOLOGIC:  No signs of active bleeding. Monitor Hgb in CBC in AM  DVT prophylaxis with heparin subQ               INFECTIOUS DISEASE:  No signs of active infection. Will monitor for fever and leukocytosis.           ENDOCRINE:  Stable – Monitor glucose fingersticks for goal 120-180.               ONCOLOGY:  Lung nodule - Improved. S/P resection. Follow up final pathology.           Pertinent clinical, laboratory, radiographic, hemodynamic, echocardiographic, respiratory data, microbiologic data and chart were reviewed by myself and analyzed frequently throughout the course of the day and night by myself.    Plan discussed at length with the CTICU staff and Attending CT Surgeon -   Dr Erik Hoffmann.      Patient's status was discussed with patient at bedside.       	  I have spent 50 minutes of time with this patient monitoring hemodynamic status, respiratory status, and coordinating care in the ICU.    ________________________________________________      _________________________  VITAL SIGNS:  Vital Signs Last 24 Hrs  T(C): 36.4 (21 May 2024 12:05), Max: 36.7 (21 May 2024 06:59)  T(F): 97.6 (21 May 2024 12:05), Max: 98.1 (21 May 2024 06:59)  HR: 72 (21 May 2024 12:45) (52 - 74)  BP: 149/72 (21 May 2024 12:45) (134/73 - 168/74)  BP(mean): 90 (21 May 2024 12:45) (90 - 102)  RR: 17 (21 May 2024 12:45) (13 - 22)  SpO2: 98% (21 May 2024 12:45) (97% - 100%)    Parameters below as of 21 May 2024 12:45  Patient On (Oxygen Delivery Method): nasal cannula w/ humidification  O2 Flow (L/min): 2    I/Os:   I&O's Detail    21 May 2024 07:01  -  21 May 2024 12:59  --------------------------------------------------------  IN:    sodium chloride 0.9%: 60 mL  Total IN: 60 mL    OUT:    Chest Tube (mL): 5 mL  Total OUT: 5 mL    Total NET: 55 mL              MEDICATIONS:  MEDICATIONS  (STANDING):  acetaminophen   IVPB .. 1000 milliGRAM(s) IV Intermittent every 6 hours  albuterol    0.083% 2.5 milliGRAM(s) Nebulizer every 6 hours  aspirin enteric coated 81 milliGRAM(s) Oral daily  atorvastatin 20 milliGRAM(s) Oral at bedtime  bacitracin   Ointment 1 Application(s) Topical daily  heparin   Injectable 5000 Unit(s) SubCutaneous every 8 hours  HYDROmorphone PCA (1 mG/mL) 30 milliLiter(s) PCA Continuous PCA Continuous  lidocaine   4% Patch 1 Patch Transdermal <User Schedule>  multivitamin 1 Tablet(s) Oral daily  polyethylene glycol 3350 17 Gram(s) Oral daily  senna 2 Tablet(s) Oral at bedtime  sodium chloride 0.9%. 1000 milliLiter(s) (30 mL/Hr) IV Continuous <Continuous>    MEDICATIONS  (PRN):  HYDROmorphone PCA (1 mG/mL) Rescue Clinician Bolus 0.5 milliGRAM(s) IV Push every 15 minutes PRN for Pain Scale GREATER THAN 6  naloxone Injectable 0.1 milliGRAM(s) IV Push every 3 minutes PRN For ANY of the following changes in patient status:  A. RR LESS THAN 10 breaths per minute, B. Oxygen saturation LESS THAN 90%, C. Sedation score of 6  ondansetron Injectable 4 milliGRAM(s) IV Push every 6 hours PRN Nausea      LABS:  Pre-op Laboratory data was independently reviewed by me today.     4/5/24 - Hgb 15.1, Cr 1.44        RADIOLOGY:   Radiology images were independently reviewed by me today. Reports were reviewed by me today.    Post-op CXR 5/21/24 - Lungs clear. Chest tube in place. No pneumothorax        Xray Chest 2 Views PA/Lat:   ACC: [id number] EXAM:  XR CHEST PA LAT 2V   ORDERED BY: MIRANDA ANDERSON     PROCEDURE DATE:  04/30/2024          INTERPRETATION:  CLINICAL INFORMATION: Lung nodule    TIME OF EXAMINATION: April 30, 2024 at 11:38 AM    EXAM: PA and lateral chest:    FINDINGS:  There is a 2 cm faint nodule in the left midlung field more easily seen   than on the last study.  Remainder of both lungs are clear and the heart is not enlarged.  No effusions or pneumothorax.      COMPARISON: April 9, 2024        IMPRESSION: Left lower lobe nodule.    --- End of Report ---            TOM PEREZ MD; Attending Radiologist  This document has been electronically signed. Apr 30 2024 10:57AM (04-30-24 @ 09:32)   	    CHIEF COMPLAINT: FOLLOW UP IN ICU FOR POSTOPERATIVE CARE OF PATIENT WHO IS S/P LUNG RESECTION      PROCEDURES:   - L VATS, robotic lingulectomy, MLND 21-May-2024  - Bronchoscopy, EBUS, needle biopsy of mediastinal lymph nodes, and bronchoalveolar lavage 09-Apr-2024    ISSUES:   Lung nodule  Post op pain  Chest tube in place  CKD stage 3  Renal artery stenosis  Thoracic aortic aneurysm   HTN  HLD  Anxiety  Depression  Hx of renal calculi   Hx of basal cell carcinoma of face s/p excision   Former smoker      INTERVAL EVENTS:   OR today. Extubated in OR. Transferred to CTICU.      HISTORY:   Patient reports moderate pain at chest wall incision sites which is worse with coughing and deep breathing without associated fever or dyspnea. Pain is improved with use of pain meds.     PHYSICAL EXAM:   Gen: Comfortable, No acute distress  Eyes: Sclera white, Conjunctiva normal, Eyelids normal, Pupils symmetrical   ENT: Mucous membranes moist,  ,  ,    Neck: Trachea midline,  ,  ,  ,  ,  ,    CV: Rate regular, Rhythm regular,  ,  ,    Resp: Breath sounds clear, No accessory muscles use, L chest tube in place,  ,    Abd: Soft, Non-distended, Non-tender,   ,  ,  ,    Skin: Warm, No peripheral edema of lower extremities,  ,    : No casiano  Neuro: Moving all 4 extremities,    Psych: A&Ox3      ASSESSMENT AND PLAN:     NEURO:  Post-operative Pain - Stable. Pain control with PCA and Tylenol IV PRN.    Depression - stable.   Anxiety - stable.       RESPIRATORY:  Hypoxia - Wean nasal cannula for goal O2sat above 92. Obtain CXR. Incentive spirometry. Chest PT and frequent suctioning. Continue bronchodilators. OOB to chair & ambulate w/ assistance. Continuous pulse oximetry for support & to prevent decompensation.       Chest tube – Pleurevac regulated suctioning. Monitor chest tube output.               CARDIOVASCULAR:  Hemodynamically stable - Not on pressors. Continue hemodynamic monitoring.  Telemetry (medical test) - Reviewed by me today independently. Normal sinus rhythm.    HTN - stable. Hold home antihypertensives for now.     Renal artery stenosis - stable.  - Continue aspirin PO     Thoracic aortic aneurysm - stable  - Maintain BP control      RENAL:  CKD stage 3 – Stable. Renally dose medications. Monitor for hyperkalemia and uremia. Avoid nephrotoxic medications. Monitor IOs and electrolytes.              GASTROINTESTINAL:  GI prophylaxis not indicated  Zofran and Reglan IV PRN for nausea  Regular consistency diet            HEMATOLOGIC:  No signs of active bleeding. Monitor Hgb in CBC in AM  DVT prophylaxis with heparin subQ               INFECTIOUS DISEASE:  No signs of active infection. Will monitor for fever and leukocytosis.           ENDOCRINE:  Stable – Monitor glucose fingersticks for goal 120-180.               ONCOLOGY:  Lung nodule - Improved. S/P resection. Follow up final pathology.           Pertinent clinical, laboratory, radiographic, hemodynamic, echocardiographic, respiratory data, microbiologic data and chart were reviewed by myself and analyzed frequently throughout the course of the day and night by myself.    Plan discussed at length with the CTICU staff and Attending CT Surgeon -   Dr Erik Hoffmann.      Patient's status was discussed with patient at bedside.       	  I have spent 50 minutes of time with this patient monitoring hemodynamic status, respiratory status, and coordinating care in the ICU.    ________________________________________________      _________________________  VITAL SIGNS:  Vital Signs Last 24 Hrs  T(C): 36.4 (21 May 2024 12:05), Max: 36.7 (21 May 2024 06:59)  T(F): 97.6 (21 May 2024 12:05), Max: 98.1 (21 May 2024 06:59)  HR: 72 (21 May 2024 12:45) (52 - 74)  BP: 149/72 (21 May 2024 12:45) (134/73 - 168/74)  BP(mean): 90 (21 May 2024 12:45) (90 - 102)  RR: 17 (21 May 2024 12:45) (13 - 22)  SpO2: 98% (21 May 2024 12:45) (97% - 100%)    Parameters below as of 21 May 2024 12:45  Patient On (Oxygen Delivery Method): nasal cannula w/ humidification  O2 Flow (L/min): 2    I/Os:   I&O's Detail    21 May 2024 07:01  -  21 May 2024 12:59  --------------------------------------------------------  IN:    sodium chloride 0.9%: 60 mL  Total IN: 60 mL    OUT:    Chest Tube (mL): 5 mL  Total OUT: 5 mL    Total NET: 55 mL              MEDICATIONS:  MEDICATIONS  (STANDING):  acetaminophen   IVPB .. 1000 milliGRAM(s) IV Intermittent every 6 hours  albuterol    0.083% 2.5 milliGRAM(s) Nebulizer every 6 hours  aspirin enteric coated 81 milliGRAM(s) Oral daily  atorvastatin 20 milliGRAM(s) Oral at bedtime  bacitracin   Ointment 1 Application(s) Topical daily  heparin   Injectable 5000 Unit(s) SubCutaneous every 8 hours  HYDROmorphone PCA (1 mG/mL) 30 milliLiter(s) PCA Continuous PCA Continuous  lidocaine   4% Patch 1 Patch Transdermal <User Schedule>  multivitamin 1 Tablet(s) Oral daily  polyethylene glycol 3350 17 Gram(s) Oral daily  senna 2 Tablet(s) Oral at bedtime  sodium chloride 0.9%. 1000 milliLiter(s) (30 mL/Hr) IV Continuous <Continuous>    MEDICATIONS  (PRN):  HYDROmorphone PCA (1 mG/mL) Rescue Clinician Bolus 0.5 milliGRAM(s) IV Push every 15 minutes PRN for Pain Scale GREATER THAN 6  naloxone Injectable 0.1 milliGRAM(s) IV Push every 3 minutes PRN For ANY of the following changes in patient status:  A. RR LESS THAN 10 breaths per minute, B. Oxygen saturation LESS THAN 90%, C. Sedation score of 6  ondansetron Injectable 4 milliGRAM(s) IV Push every 6 hours PRN Nausea      LABS:  Pre-op Laboratory data was independently reviewed by me today.     4/5/24 - Hgb 15.1, Cr 1.44        RADIOLOGY:   Radiology images were independently reviewed by me today. Reports were reviewed by me today.    Post-op CXR 5/21/24 - Lungs clear. Chest tube in place. No pneumothorax        Xray Chest 2 Views PA/Lat:   ACC: [id number] EXAM:  XR CHEST PA LAT 2V   ORDERED BY: MIRANDA ANDERSON     PROCEDURE DATE:  04/30/2024          INTERPRETATION:  CLINICAL INFORMATION: Lung nodule    TIME OF EXAMINATION: April 30, 2024 at 11:38 AM    EXAM: PA and lateral chest:    FINDINGS:  There is a 2 cm faint nodule in the left midlung field more easily seen   than on the last study.  Remainder of both lungs are clear and the heart is not enlarged.  No effusions or pneumothorax.      COMPARISON: April 9, 2024        IMPRESSION: Left lower lobe nodule.    --- End of Report ---            TOM PEREZ MD; Attending Radiologist  This document has been electronically signed. Apr 30 2024 10:57AM (04-30-24 @ 09:32)

## 2024-05-21 NOTE — BRIEF OPERATIVE NOTE - COMMENTS
I, Jameson Woods PA-C provided direct first assist support to the surgeon during this surgical procedure. My involvement included positioning, prepping and draping the patient prior to surgery, robotic port placement, robotic docking, robotic instrument insertion and removal, ensuring clear visibility and exposure for the surgeon by using instruments such as retractors, suction and sponges, stapling tissues and vessels, retrieving specimens from the operative field, closing surgical incisions, undocking the robot and dressing wounds.  As well as other tasks as directed by the surgeon.

## 2024-05-21 NOTE — PATIENT PROFILE ADULT - FALL HARM RISK - RISK INTERVENTIONS
Assistance OOB with selected safe patient handling equipment/Assistance with ambulation/Communicate Fall Risk and Risk Factors to all staff, patient, and family/Monitor gait and stability/Reinforce activity limits and safety measures with patient and family/Sit up slowly, dangle for a short time, stand at bedside before walking/Use of alarms - bed, chair and/or voice tab/Visual Cue: Yellow wristband/Bed in lowest position, wheels locked, appropriate side rails in place/Call bell, personal items and telephone in reach/Instruct patient to call for assistance before getting out of bed or chair/Non-slip footwear when patient is out of bed/[place] to call system/Physically safe environment - no spills, clutter or unnecessary equipment/Purposeful Proactive Rounding/Room/bathroom lighting operational, light cord in reach

## 2024-05-21 NOTE — BRIEF OPERATIVE NOTE - NSICDXBRIEFPROCEDURE_GEN_ALL_CORE_FT
PROCEDURES:  Flexible bronchoscopy 21-May-2024 12:20:19  Ira Boo  Lung segmentectomy 21-May-2024 12:20:25  Ira Boo

## 2024-05-22 LAB
ANION GAP SERPL CALC-SCNC: 13 MMOL/L — SIGNIFICANT CHANGE UP (ref 7–14)
BASOPHILS # BLD AUTO: 0.01 K/UL — SIGNIFICANT CHANGE UP (ref 0–0.2)
BASOPHILS NFR BLD AUTO: 0.1 % — SIGNIFICANT CHANGE UP (ref 0–2)
BUN SERPL-MCNC: 28 MG/DL — HIGH (ref 7–23)
CALCIUM SERPL-MCNC: 9.2 MG/DL — SIGNIFICANT CHANGE UP (ref 8.4–10.5)
CHLORIDE SERPL-SCNC: 103 MMOL/L — SIGNIFICANT CHANGE UP (ref 98–107)
CO2 SERPL-SCNC: 23 MMOL/L — SIGNIFICANT CHANGE UP (ref 22–31)
CREAT SERPL-MCNC: 1.6 MG/DL — HIGH (ref 0.5–1.3)
EGFR: 45 ML/MIN/1.73M2 — LOW
EOSINOPHIL # BLD AUTO: 0 K/UL — SIGNIFICANT CHANGE UP (ref 0–0.5)
EOSINOPHIL NFR BLD AUTO: 0 % — SIGNIFICANT CHANGE UP (ref 0–6)
GLUCOSE SERPL-MCNC: 140 MG/DL — HIGH (ref 70–99)
HCT VFR BLD CALC: 38.3 % — LOW (ref 39–50)
HGB BLD-MCNC: 13.3 G/DL — SIGNIFICANT CHANGE UP (ref 13–17)
IANC: 10.1 K/UL — HIGH (ref 1.8–7.4)
IMM GRANULOCYTES NFR BLD AUTO: 0.4 % — SIGNIFICANT CHANGE UP (ref 0–0.9)
LYMPHOCYTES # BLD AUTO: 1.02 K/UL — SIGNIFICANT CHANGE UP (ref 1–3.3)
LYMPHOCYTES # BLD AUTO: 8.6 % — LOW (ref 13–44)
MAGNESIUM SERPL-MCNC: 1.6 MG/DL — SIGNIFICANT CHANGE UP (ref 1.6–2.6)
MCHC RBC-ENTMCNC: 30.9 PG — SIGNIFICANT CHANGE UP (ref 27–34)
MCHC RBC-ENTMCNC: 34.7 GM/DL — SIGNIFICANT CHANGE UP (ref 32–36)
MCV RBC AUTO: 88.9 FL — SIGNIFICANT CHANGE UP (ref 80–100)
MONOCYTES # BLD AUTO: 0.72 K/UL — SIGNIFICANT CHANGE UP (ref 0–0.9)
MONOCYTES NFR BLD AUTO: 6.1 % — SIGNIFICANT CHANGE UP (ref 2–14)
MRSA PCR RESULT.: SIGNIFICANT CHANGE UP
NEUTROPHILS # BLD AUTO: 10.1 K/UL — HIGH (ref 1.8–7.4)
NEUTROPHILS NFR BLD AUTO: 84.8 % — HIGH (ref 43–77)
NRBC # BLD: 0 /100 WBCS — SIGNIFICANT CHANGE UP (ref 0–0)
NRBC # FLD: 0 K/UL — SIGNIFICANT CHANGE UP (ref 0–0)
PHOSPHATE SERPL-MCNC: 3 MG/DL — SIGNIFICANT CHANGE UP (ref 2.5–4.5)
PLATELET # BLD AUTO: 124 K/UL — LOW (ref 150–400)
POTASSIUM SERPL-MCNC: 4.5 MMOL/L — SIGNIFICANT CHANGE UP (ref 3.5–5.3)
POTASSIUM SERPL-SCNC: 4.5 MMOL/L — SIGNIFICANT CHANGE UP (ref 3.5–5.3)
RBC # BLD: 4.31 M/UL — SIGNIFICANT CHANGE UP (ref 4.2–5.8)
RBC # FLD: 13.2 % — SIGNIFICANT CHANGE UP (ref 10.3–14.5)
S AUREUS DNA NOSE QL NAA+PROBE: SIGNIFICANT CHANGE UP
SODIUM SERPL-SCNC: 139 MMOL/L — SIGNIFICANT CHANGE UP (ref 135–145)
WBC # BLD: 11.9 K/UL — HIGH (ref 3.8–10.5)
WBC # FLD AUTO: 11.9 K/UL — HIGH (ref 3.8–10.5)

## 2024-05-22 PROCEDURE — 99233 SBSQ HOSP IP/OBS HIGH 50: CPT

## 2024-05-22 PROCEDURE — 71045 X-RAY EXAM CHEST 1 VIEW: CPT | Mod: 26

## 2024-05-22 RX ORDER — NEBIVOLOL HYDROCHLORIDE 5 MG/1
10 TABLET ORAL DAILY
Refills: 0 | Status: DISCONTINUED | OUTPATIENT
Start: 2024-05-22 | End: 2024-05-22

## 2024-05-22 RX ORDER — ACETAMINOPHEN 500 MG
650 TABLET ORAL EVERY 6 HOURS
Refills: 0 | Status: DISCONTINUED | OUTPATIENT
Start: 2024-05-22 | End: 2024-05-23

## 2024-05-22 RX ORDER — NEBIVOLOL HYDROCHLORIDE 5 MG/1
0.5 TABLET ORAL
Refills: 0 | DISCHARGE

## 2024-05-22 RX ORDER — HYDROMORPHONE HYDROCHLORIDE 2 MG/ML
0.3 INJECTION INTRAMUSCULAR; INTRAVENOUS; SUBCUTANEOUS EVERY 4 HOURS
Refills: 0 | Status: DISCONTINUED | OUTPATIENT
Start: 2024-05-22 | End: 2024-05-23

## 2024-05-22 RX ORDER — MAGNESIUM SULFATE 500 MG/ML
2 VIAL (ML) INJECTION ONCE
Refills: 0 | Status: COMPLETED | OUTPATIENT
Start: 2024-05-22 | End: 2024-05-22

## 2024-05-22 RX ORDER — ICOSAPENT ETHYL 500 MG/1
4 CAPSULE, LIQUID FILLED ORAL
Refills: 0 | DISCHARGE

## 2024-05-22 RX ORDER — NEBIVOLOL HYDROCHLORIDE 5 MG/1
5 TABLET ORAL DAILY
Refills: 0 | Status: DISCONTINUED | OUTPATIENT
Start: 2024-05-23 | End: 2024-05-23

## 2024-05-22 RX ORDER — LOSARTAN/HYDROCHLOROTHIAZIDE 100MG-25MG
1 TABLET ORAL
Refills: 0 | DISCHARGE

## 2024-05-22 RX ORDER — OXYCODONE HYDROCHLORIDE 5 MG/1
5 TABLET ORAL EVERY 4 HOURS
Refills: 0 | Status: DISCONTINUED | OUTPATIENT
Start: 2024-05-22 | End: 2024-05-23

## 2024-05-22 RX ORDER — ATORVASTATIN CALCIUM 80 MG/1
1 TABLET, FILM COATED ORAL
Refills: 0 | DISCHARGE

## 2024-05-22 RX ORDER — HYDROMORPHONE HYDROCHLORIDE 2 MG/ML
0.5 INJECTION INTRAMUSCULAR; INTRAVENOUS; SUBCUTANEOUS EVERY 4 HOURS
Refills: 0 | Status: DISCONTINUED | OUTPATIENT
Start: 2024-05-22 | End: 2024-05-22

## 2024-05-22 RX ORDER — ACETAMINOPHEN 500 MG
650 TABLET ORAL EVERY 6 HOURS
Refills: 0 | Status: DISCONTINUED | OUTPATIENT
Start: 2024-05-22 | End: 2024-05-22

## 2024-05-22 RX ORDER — OXYCODONE HYDROCHLORIDE 5 MG/1
5 TABLET ORAL EVERY 4 HOURS
Refills: 0 | Status: DISCONTINUED | OUTPATIENT
Start: 2024-05-22 | End: 2024-05-22

## 2024-05-22 RX ORDER — ALBUMIN HUMAN 25 %
250 VIAL (ML) INTRAVENOUS ONCE
Refills: 0 | Status: COMPLETED | OUTPATIENT
Start: 2024-05-22 | End: 2024-05-22

## 2024-05-22 RX ADMIN — OXYCODONE HYDROCHLORIDE 5 MILLIGRAM(S): 5 TABLET ORAL at 13:19

## 2024-05-22 RX ADMIN — OXYCODONE HYDROCHLORIDE 5 MILLIGRAM(S): 5 TABLET ORAL at 12:36

## 2024-05-22 RX ADMIN — Medication 81 MILLIGRAM(S): at 11:32

## 2024-05-22 RX ADMIN — Medication 650 MILLIGRAM(S): at 11:32

## 2024-05-22 RX ADMIN — Medication 1 APPLICATION(S): at 11:32

## 2024-05-22 RX ADMIN — Medication 400 MILLIGRAM(S): at 06:15

## 2024-05-22 RX ADMIN — Medication 650 MILLIGRAM(S): at 17:53

## 2024-05-22 RX ADMIN — ALBUTEROL 2.5 MILLIGRAM(S): 90 AEROSOL, METERED ORAL at 03:31

## 2024-05-22 RX ADMIN — NEBIVOLOL HYDROCHLORIDE 10 MILLIGRAM(S): 5 TABLET ORAL at 12:36

## 2024-05-22 RX ADMIN — HEPARIN SODIUM 5000 UNIT(S): 5000 INJECTION INTRAVENOUS; SUBCUTANEOUS at 06:15

## 2024-05-22 RX ADMIN — LIDOCAINE 1 PATCH: 4 CREAM TOPICAL at 05:00

## 2024-05-22 RX ADMIN — ALBUTEROL 2.5 MILLIGRAM(S): 90 AEROSOL, METERED ORAL at 09:45

## 2024-05-22 RX ADMIN — HEPARIN SODIUM 5000 UNIT(S): 5000 INJECTION INTRAVENOUS; SUBCUTANEOUS at 15:13

## 2024-05-22 RX ADMIN — Medication 1000 MILLIGRAM(S): at 06:30

## 2024-05-22 RX ADMIN — Medication 250 MILLILITER(S): at 06:33

## 2024-05-22 RX ADMIN — ATORVASTATIN CALCIUM 20 MILLIGRAM(S): 80 TABLET, FILM COATED ORAL at 21:26

## 2024-05-22 RX ADMIN — SODIUM CHLORIDE 30 MILLILITER(S): 9 INJECTION INTRAMUSCULAR; INTRAVENOUS; SUBCUTANEOUS at 07:54

## 2024-05-22 RX ADMIN — ALBUTEROL 2.5 MILLIGRAM(S): 90 AEROSOL, METERED ORAL at 15:30

## 2024-05-22 RX ADMIN — OXYCODONE HYDROCHLORIDE 5 MILLIGRAM(S): 5 TABLET ORAL at 17:58

## 2024-05-22 RX ADMIN — POLYETHYLENE GLYCOL 3350 17 GRAM(S): 17 POWDER, FOR SOLUTION ORAL at 11:32

## 2024-05-22 RX ADMIN — Medication 25 GRAM(S): at 04:09

## 2024-05-22 RX ADMIN — SENNA PLUS 2 TABLET(S): 8.6 TABLET ORAL at 21:26

## 2024-05-22 RX ADMIN — HYDROMORPHONE HYDROCHLORIDE 30 MILLILITER(S): 2 INJECTION INTRAMUSCULAR; INTRAVENOUS; SUBCUTANEOUS at 07:54

## 2024-05-22 RX ADMIN — Medication 1 TABLET(S): at 11:32

## 2024-05-22 RX ADMIN — ALBUTEROL 2.5 MILLIGRAM(S): 90 AEROSOL, METERED ORAL at 22:50

## 2024-05-22 RX ADMIN — HEPARIN SODIUM 5000 UNIT(S): 5000 INJECTION INTRAVENOUS; SUBCUTANEOUS at 21:27

## 2024-05-22 NOTE — PHYSICAL THERAPY INITIAL EVALUATION ADULT - GENERAL OBSERVATIONS, REHAB EVAL
Pt encountered sitting in chair in no distress, +telemetry, +chest tube. Heart rate = 66 bpm, SpO2 = 96%.

## 2024-05-22 NOTE — CONSULT NOTE ADULT - SUBJECTIVE AND OBJECTIVE BOX
DATE OF SERVICE: 05-22-24    HISTORY OF PRESENT ILLNESS: HPI:  Pt is a 73 yr old male with PMH of HTN, HLD, CKD stage III, Renal artery stenosis, BCC on face (s/p excision), presented for Flexible bronchoscopy, left VATS, robotic assisted lung resection he is now s/p Left lingular nodule s/p Flexible Bronchoscopy, L VATS, robotic lingulectomy,    PAST MEDICAL & SURGICAL HISTORY:  HTN  Hyperlipidemia  Stage 3 chronic kidney disease  Renal artery stenosis  H/O aortic aneurysm  Basal cell carcinoma  Anxiety and depression  Aortic aneurysm of unspecified site, without rupture  Renal calculi  History of BPH  H/O basal cell carcinoma excision  S/P rotator cuff surgery  History of bronchoscopy    MEDICATIONS:  MEDICATIONS  (STANDING):  acetaminophen     Tablet .. 650 milliGRAM(s) Oral every 6 hours  albuterol    0.083% 2.5 milliGRAM(s) Nebulizer every 6 hours  aspirin enteric coated 81 milliGRAM(s) Oral daily  atorvastatin 20 milliGRAM(s) Oral at bedtime  bacitracin   Ointment 1 Application(s) Topical daily  heparin   Injectable 5000 Unit(s) SubCutaneous every 8 hours  lidocaine   4% Patch 1 Patch Transdermal <User Schedule>  multivitamin 1 Tablet(s) Oral daily  polyethylene glycol 3350 17 Gram(s) Oral daily  senna 2 Tablet(s) Oral at bedtime    Allergies: shellfish        SOCIAL HISTORY:    [X ] Non-smoker        LABS:	 	               13.3   11.90 )-----------( 124      ( 22 May 2024 02:30 )             38.3     Hb Trend: 13.3<--    05-22    139  |  103  |  28<H>  ----------------------------<  140<H>  4.5   |  23  |  1.60<H>    Ca    9.2      22 May 2024 02:30  Phos  3.0     05-22  Mg     1.60     05-22      Creatinine Trend: 1.60<--    PHYSICAL EXAM:  T(C): 36.6 (05-22-24 @ 12:13), Max: 36.9 (05-21-24 @ 20:00)  HR: 64 (05-22-24 @ 12:13) (61 - 91)  BP: 121/62 (05-22-24 @ 12:13) (102/64 - 148/72)  RR: 19 (05-22-24 @ 12:13) (12 - 22)  SpO2: 98% (05-22-24 @ 12:13) (93% - 100%)  Wt(kg): --   BMI (kg/m2): 29.4 (05-21-24 @ 06:59)  I&O's Summary    21 May 2024 07:01  -  22 May 2024 07:00  --------------------------------------------------------  IN: 1870 mL / OUT: 1270 mL / NET: 600 mL    22 May 2024 07:01  -  22 May 2024 15:16  --------------------------------------------------------  IN: 60 mL / OUT: 510 mL / NET: -450 mL        Gen: NAD  HEENT:  (-)icterus (-)pallor  CV: N S1 S2 1/6 PABLO (+)2 Pulses B/l  Resp:  Clear to auscultation B/L, normal effort  GI: (+) BS Soft, NT, ND  Lymph:  (-)Edema, (-)obvious lymphadenopathy  Skin: Warm to touch, Normal turgor  Psych: Appropriate mood and affect      TELEMETRY: 	NSR

## 2024-05-22 NOTE — CONSULT NOTE ADULT - ASSESSMENT
73 yr old male with PMH of HTN, HLD, CKD stage III, Renal artery stenosis, BCC on face (s/p excision), presented for Flexible bronchoscopy, left VATS, robotic assisted lung resection he is now s/p Left lingular nodule s/p Flexible Bronchoscopy, L VATS, robotic lingulectomy,    - Tele stable  - cardiology following  - BP controlled, pain is controlled  - On Losartan, Bystolic and HCTZ at home, ok to hold for now  - Normal EF on recent TTE and normal perfusion on stress done 01/2022  - dvt ppx w as per surgery

## 2024-05-22 NOTE — DISCHARGE NOTE PROVIDER - NSDCFUADDAPPT_GEN_ALL_CORE_FT
Follow up with Dr. Hernandez in 10-14 days. Call Thoracic Surgery office [phone] to schedule an appointment. Please, obtain a CXR 1-2 days prior to your appointment and bring a copy with you.  Please, make an appointment with your Primary care provider. Follow up with Dr. Hoffmann in 10-14 days. Call Thoracic Surgery office [phone] to schedule an appointment. Please, obtain a CXR 1-2 days prior to your appointment and bring a copy with you.  Please, make an appointment with your Primary care provider.

## 2024-05-22 NOTE — CONSULT NOTE ADULT - SUBJECTIVE AND OBJECTIVE BOX
C A R D I O L O G Y  *********************    DATE OF SERVICE: 05-22-24    HISTORY OF PRESENT ILLNESS: HPI:  Pt is a 73 yr old male with PMH of HTN, HLD, CKD stage III, Renal artery stenosis, BCC on face (s/p excision), presented for Flexible bronchoscopy, left VATS, robotic assisted lung resection he is now s/p Left lingular nodule s/p Flexible Bronchoscopy, L VATS, robotic lingulectomy,    PAST MEDICAL & SURGICAL HISTORY:  HTN  Hyperlipidemia  Stage 3 chronic kidney disease  Renal artery stenosis  H/O aortic aneurysm  Basal cell carcinoma  Anxiety and depression  Aortic aneurysm of unspecified site, without rupture  Renal calculi  History of BPH  H/O basal cell carcinoma excision  S/P rotator cuff surgery  History of bronchoscopy    MEDICATIONS:  MEDICATIONS  (STANDING):  acetaminophen     Tablet .. 650 milliGRAM(s) Oral every 6 hours  albuterol    0.083% 2.5 milliGRAM(s) Nebulizer every 6 hours  aspirin enteric coated 81 milliGRAM(s) Oral daily  atorvastatin 20 milliGRAM(s) Oral at bedtime  bacitracin   Ointment 1 Application(s) Topical daily  heparin   Injectable 5000 Unit(s) SubCutaneous every 8 hours  lidocaine   4% Patch 1 Patch Transdermal <User Schedule>  multivitamin 1 Tablet(s) Oral daily  polyethylene glycol 3350 17 Gram(s) Oral daily  senna 2 Tablet(s) Oral at bedtime    Allergiesshellfish    FAMILY HISTORY:Non-contributary for premature coronary disease or sudden cardiac death    SOCIAL HISTORY:    [X ] Non-smoker  [ ] Smoker  [ ] Alcohol    REVIEW OF SYSTEMS:  [ ]chest pain  [  ]shortness of breath  [  ]palpitations  [  ]syncope  [ ]near syncope [ ]upper extremity weakness   [ ] lower extremity weakness  [  ]diplopia  [  ]altered mental status   [  ]fevers  [ ]chills [ ]nausea  [ ]vomiting  [  ]dysphagia    [ ]abdominal pain  [ ]melena  [ ]BRBPR    [  ]epistaxis  [  ]rash    [ ]lower extremity edema    [X] All others negative	  [ ] Unable to obtain    LABS:	 	    CARDIAC MARKERS:                  13.3   11.90 )-----------( 124      ( 22 May 2024 02:30 )             38.3     Hb Trend: 13.3<--    05-22    139  |  103  |  28<H>  ----------------------------<  140<H>  4.5   |  23  |  1.60<H>    Ca    9.2      22 May 2024 02:30  Phos  3.0     05-22  Mg     1.60     05-22      Creatinine Trend: 1.60<--    PHYSICAL EXAM:  T(C): 36.6 (05-22-24 @ 12:13), Max: 36.9 (05-21-24 @ 20:00)  HR: 64 (05-22-24 @ 12:13) (61 - 91)  BP: 121/62 (05-22-24 @ 12:13) (102/64 - 148/72)  RR: 19 (05-22-24 @ 12:13) (12 - 22)  SpO2: 98% (05-22-24 @ 12:13) (93% - 100%)  Wt(kg): --   BMI (kg/m2): 29.4 (05-21-24 @ 06:59)  I&O's Summary    21 May 2024 07:01  -  22 May 2024 07:00  --------------------------------------------------------  IN: 1870 mL / OUT: 1270 mL / NET: 600 mL    22 May 2024 07:01  -  22 May 2024 15:28  --------------------------------------------------------  IN: 60 mL / OUT: 510 mL / NET: -450 mL        Gen: NAD  HEENT:  (-)icterus (-)pallor  CV: N S1 S2 1/6 PABLO (+)2 Pulses B/l  Resp:  Clear to auscultation B/L, normal effort  GI: (+) BS Soft, NT, ND  Lymph:  (-)Edema, (-)obvious lymphadenopathy  Skin: Warm to touch, Normal turgor  Psych: Appropriate mood and affect      TELEMETRY: 	NSR      ASSESSMENT/PLAN: Pt is a 73 yr old male with PMH of HTN, HLD, CKD stage III, Renal artery stenosis, BCC on face (s/p excision), presented for Flexible bronchoscopy, left VATS, robotic assisted lung resection he is now s/p Left lingular nodule s/p Flexible Bronchoscopy, L VATS, robotic lingulectomy,    - Tele stable  - BP controlled  - On Losartan, Bystolic and HCTZ at home, ok to hold for now  - Normal EF on recent TTE and normal perfusion on stress done 01/2022    Samuel Mitchell MD  Pager: [phone]

## 2024-05-22 NOTE — PHYSICAL THERAPY INITIAL EVALUATION ADULT - ADDITIONAL COMMENTS
Pt states he lives with his wife in a house with 1 step to enter and a flight inside. Prior to admission pt reports being independent in ADLs and ambulation without device.     Following evaluation, pt was left sitting in chair in no distress, all lines in tact, call bell in reach.

## 2024-05-22 NOTE — DISCHARGE NOTE PROVIDER - NSDCACTIVITY_GEN_ALL_CORE
Do not drive or operate machinery/Showering allowed/Do not make important decisions/Walking - Indoors allowed/No heavy lifting/straining/Walking - Outdoors allowed Sex allowed/Do not drive or operate machinery/Showering allowed/Do not make important decisions/Stairs allowed/Walking - Indoors allowed/No heavy lifting/straining/Walking - Outdoors allowed

## 2024-05-22 NOTE — DISCHARGE NOTE PROVIDER - NSDCFUADDINST_GEN_ALL_CORE_FT
Please walk 4-5 x per day, Increase as tolerated. You may climb stairs.     Continue to use incentive spirometer 10 times every hour.     Please keep all wounds covered for first 24 hours. You may uncover it after 24 hours and start showering daily. If the wound gets wet, please dap dry it and keep it clean. The suture will be removed in office at follow up apt. If you see abnormal discharge from wound, wound is getting swollen and red, more painful to touch, fever, please call the office at [phone] or go to the ER as soon as possible.  Please walk 4-5 x per day, Increase as tolerated. You may climb stairs.   Continue to use incentive spirometer 10 times every hour.   Keep the chest tube site clean with soap and water.  Allow it to dry and leave it open to air as much as possible.  Some drainage is normal but watch for pus, increasing redness, fevers, difficulty breathing or other unusual symptoms and if noted, call Dr Hoffmann.  The suture will come out at the office.  You can use Bacitracin on the burn wound daily.

## 2024-05-22 NOTE — PROGRESS NOTE ADULT - SUBJECTIVE AND OBJECTIVE BOX
KAYLEE WARNER      73y   Male   MRN-[id number]         No Known Drug Allergies  shellfish (Other)             Daily     Daily Weight in k.7 (22 May 2024 00:00)Drug Dosing Weight  Height (cm): 177.8 (21 May 2024 06:59)  Weight (kg): 92.986 (21 May 2024 06:59)  BMI (kg/m2): 29.4 (21 May 2024 06:59)  BSA (m2): 2.11 (21 May 2024 06:59)    HPI:  73 yr old male with PMH of HTN, HLD, CKD stage III, Renal artery stenosis, BCC on face (s/p excision), presents to PST for preop evaluation to have Flexible bronchoscopy, left VATS, robotic assisted lung resection on 24.    Pt stated that he had cold like symptoms a month ago, found to have abnormal CXR and subsequent CT/PET scan showed AUGIE nodule and mediastinal lymphadenopathy. Pt had flexible bronchoscopy, endobronchial ultrasound guided biopsy with cytology on 2024.   Pt denies any SOB, wheezing or SOB. (14 May 2024 07:15)      CHIEF COMPLAINT: Follow up in ICU  for postoperative care of patient who is s/p lung surgery    Procedure:  L VATS, robotic lingulectomy, MLND 21-May-2024                       Issues:              Lung nodule              Postop pain              Chest tube in place  CKD stage 3  Renal artery stenosis  Thoracic aortic aneurysm   HTN  HLD  Anxiety  Depression  Hx of renal calculi   Hx of basal cell carcinoma of face s/p excision   Former smoker    Postop course:     Patient reports moderate pain at chest wall incision sites which is worse with coughing and deep breathing without associated fever or dyspnea. Pain is improved with use of PCA and  oral pain meds.         Home Medications:    aspirin 81 mg oral delayed release tablet: 1 tab(s) orally once a day (in the morning) (21 May 2024 07:06)  atorvastatin 20 mg oral tablet: 1 tab(s) orally once a day (in the morning) (21 May 2024 07:06)  losartan-hydroCHLOROthiazide 100 mg-12.5 mg oral tablet: 1 tab(s) orally once a day (in the morning) (21 May 2024 07:06)  Multiple Vitamins oral tablet: 1 tab(s) orally (21 May 2024 07:06)  Nebivolol 10 mg oral tablet: 1 tab(s) orally once a day (in the morning) (21 May 2024 07:06)  Vascepa 1 g oral capsule: 4 cap(s) orally once a day (in the morning) (21 May 2024 07:06)    PAST MEDICAL & SURGICAL HISTORY:  HTN (hypertension)  10 years      Hyperlipidemia  5 years      Stage 3 chronic kidney disease      Renal artery stenosis      H/O aortic aneurysm      Basal cell carcinoma      Anxiety and depression      Aortic aneurysm of unspecified site, without rupture      Renal calculi      History of BPH      H/O basal cell carcinoma excision      S/P rotator cuff surgery      History of bronchoscopy        Vital Signs Last 24 Hrs  T(C): 36.8 (22 May 2024 08:00), Max: 36.9 (21 May 2024 20:00)  T(F): 98.3 (22 May 2024 08:00), Max: 98.4 (21 May 2024 20:00)  HR: 65 (22 May 2024 09:00) (61 - 91)  BP: 122/64 (22 May 2024 09:00) (102/64 - 168/74)  BP(mean): 82 (22 May 2024 09:00) (72 - 105)  RR: 21 (22 May 2024 09:00) (12 - 22)  SpO2: 98% (22 May 2024 09:00) (93% - 100%)    Parameters below as of 22 May 2024 09:00  Patient On (Oxygen Delivery Method): room air      I&O's Detail    21 May 2024 07:01  -  22 May 2024 07:00  --------------------------------------------------------  IN:    IV PiggyBack: 550 mL    Oral Fluid: 750 mL    sodium chloride 0.9%: 570 mL  Total IN: 1870 mL    OUT:    Chest Tube (mL): 120 mL    Voided (mL): 1150 mL  Total OUT: 1270 mL    Total NET: 600 mL      22 May 2024 07:01  -  22 May 2024 09:47  --------------------------------------------------------  IN:    sodium chloride 0.9%: 60 mL  Total IN: 60 mL    OUT:    Chest Tube (mL): 0 mL    Voided (mL): 200 mL  Total OUT: 200 mL    Total NET: -140 mL        CAPILLARY BLOOD GLUCOSE        Home Medications:  aspirin 81 mg oral delayed release tablet: 1 tab(s) orally once a day (in the morning) (21 May 2024 07:06)  atorvastatin 20 mg oral tablet: 1 tab(s) orally once a day (in the morning) (21 May 2024 07:06)  losartan-hydroCHLOROthiazide 100 mg-12.5 mg oral tablet: 1 tab(s) orally once a day (in the morning) (21 May 2024 07:06)  Multiple Vitamins oral tablet: 1 tab(s) orally (21 May 2024 07:06)  Nebivolol 10 mg oral tablet: 1 tab(s) orally once a day (in the morning) (21 May 2024 07:06)  Vascepa 1 g oral capsule: 4 cap(s) orally once a day (in the morning) (21 May 2024 07:06)    MEDICATIONS  (STANDING):  albuterol    0.083% 2.5 milliGRAM(s) Nebulizer every 6 hours  aspirin enteric coated 81 milliGRAM(s) Oral daily  atorvastatin 20 milliGRAM(s) Oral at bedtime  bacitracin   Ointment 1 Application(s) Topical daily  heparin   Injectable 5000 Unit(s) SubCutaneous every 8 hours  lidocaine   4% Patch 1 Patch Transdermal <User Schedule>  multivitamin 1 Tablet(s) Oral daily  polyethylene glycol 3350 17 Gram(s) Oral daily  senna 2 Tablet(s) Oral at bedtime    MEDICATIONS  (PRN):      Physical exam:                             General:               Pt is awake, alert,  appears to be in pain but not in distress                                                  Neuro:                  Nonfocal                             Psych:                   A&Ox3                          Cardiovascular:   S1 & S2, regular                           Respiratory:         Air entry is fair and equal on both sides, has bilateral conducted sounds                           GI:                          Soft, nondistended and nontender, Bowel sounds active                            Ext:                        No cyanosis or edema     Labs:                                                                           13.3   11.90 )-----------( 124      ( 22 May 2024 02:30 )             38.3             05-22    139  |  103  |  28<H>  ----------------------------<  140<H>  4.5   |  23  |  1.60<H>    Ca    9.2      22 May 2024 02:30  Phos  3.0       Mg     1.60                             Urinalysis Basic - ( 22 May 2024 02:30 )    Color: x / Appearance: x / SG: x / pH: x  Gluc: 140 mg/dL / Ketone: x  / Bili: x / Urobili: x   Blood: x / Protein: x / Nitrite: x   Leuk Esterase: x / RBC: x / WBC x   Sq Epi: x / Non Sq Epi: x / Bacteria: x        CXR:  < from: Xray Chest 1 View- PORTABLE-Urgent (24 @ 13:08) >  IMPRESSION: Status post left thoracotomy with chest tube and clear lungs.    < end of copied text >      Plan:  General: 73yMale s/p L VATS, robotic lingulectomy, MLND 21-May-2024 , experiencing  pain with deep breathing.                             Neuro:                                         Pain control with Oxy  /  Tylenol PRN / Lidopatch                            Cardiovascular:                                          Telemetry (medical test) - Reviewed by me today independently. Pt is in normal sinus rhythm /  with some PVCs / A-fib.                                          HLD: Continue Lipitor                                         HTN: Continue hemodynamic monitoring and restart Nebivolol                                        Continue hemodynamic monitoring to prevent decompensation.                            Respiratory:                                         Postop hypoxemia -Resolved                                              CXR - Small left apical pneumo, Obtain CXR in few hours . Encourage incentive spirometry.                                                   Chest PT and frequent suctioning.                                          Continue bronchodilators, inhaled steroids, Pulmozyme and inhaled 3% saline inhalations.                                         OOB to chair & ambulate w/ assistance.                                          Continuous pulse oximetry for support & to prevent decompensation.                                         Monitor chest tube output                                         Chest tube to water seal                                                                                            GI                                         On puree diet, advance to DASH  diet as tolerated                                         Continue G.I prophylaxis with Protonix                                          Continue Zofran / Reglan for nausea - PRN                                         Continue bowel regimen	                                                                 Renal:                                         CKD-3: Cr slightly went up. BMP in AM                                         Monitor I/Os and electrolytes                                                                                        Hem/ Onc:                                         DVT prophylaxis with SQ Heparin and SCDs                                         Monitor chest tube output &  signs of bleeding.                                          Follow CBC in AM                           Infectious disease:                                            Monitor for fever / leukocytosis.                                          All surgical incision / chest tube  sites look clean                            Endocrine                                             Continue Accu-Checks with coverage                                                  Pertinent clinical, laboratory, radiographic, hemodynamic, echocardiographic, respiratory data, microbiologic data and chart were reviewed and analyzed frequently throughout the course of the day and night. Patient seen, examined and plan discussed with CT Surgeon  / CTICU team during rounds.  OOB to chair and ambulate with physical therapy as tolerated.   Status discussed with patient and updated plan of care.   I have spent 50 minutes with this patient  including 20 minutes of time monitoring hemodynamic status, respiratory status and  coordinating care in the ICU.        Nick Steinberg MD

## 2024-05-22 NOTE — DISCHARGE NOTE PROVIDER - NSDCMRMEDTOKEN_GEN_ALL_CORE_FT
aspirin 81 mg oral delayed release tablet: 1 tab(s) orally once a day (in the morning)  atorvastatin 20 mg oral tablet: 1 tab(s) orally once a day (in the morning)  losartan-hydroCHLOROthiazide 100 mg-12.5 mg oral tablet: 1 tab(s) orally once a day (in the morning)  Multiple Vitamins oral tablet: 1 tab(s) orally  Nebivolol 10 mg oral tablet: 1 tab(s) orally once a day (in the morning)  Vascepa 1 g oral capsule: 4 cap(s) orally once a day (in the morning)   aspirin 81 mg oral delayed release tablet: 1 tab(s) orally once a day (in the morning)  atorvastatin 20 mg oral tablet: 1 tab(s) orally once a day (in the morning)  losartan-hydroCHLOROthiazide 100 mg-12.5 mg oral tablet: 1 tab(s) orally once a day (in the morning)  Multiple Vitamins oral tablet: 1 tab(s) orally  Nebivolol 10 mg oral tablet: 0.5 tab(s) orally once a day (in the morning)  Vascepa 1 g oral capsule: 4 cap(s) orally once a day (in the morning)   acetaminophen 325 mg oral tablet: 2 tab(s) orally every 6 hours as needed for  mild pain  aspirin 81 mg oral delayed release tablet: 1 tab(s) orally once a day (in the morning)  atorvastatin 20 mg oral tablet: 1 tab(s) orally once a day (in the morning)  bacitracin 500 units/g topical ointment: 1 Apply topically to affected area once a day  losartan-hydroCHLOROthiazide 100 mg-12.5 mg oral tablet: 1 tab(s) orally once a day (in the morning)  Multiple Vitamins oral tablet: 1 tab(s) orally once a day  Nebivolol 10 mg oral tablet: 0.5 tab(s) orally once a day (in the morning)  oxyCODONE 5 mg oral tablet: 1 tab(s) orally every 4 hours as needed for Moderate to Severe  Pain (4 - 10) MDD: 6  polyethylene glycol 3350 oral powder for reconstitution: 17 gram(s) orally once a day  senna leaf extract oral tablet: 2 tab(s) orally once a day (at bedtime)  Vascepa 1 g oral capsule: 4 cap(s) orally once a day (in the morning)

## 2024-05-22 NOTE — DISCHARGE NOTE PROVIDER - CARE PROVIDER_API CALL
Erik Hoffmann  Thoracic Surgery  [address] [place] [id number]  Phone: [phone]  Fax: [phone]  Follow Up Time:

## 2024-05-22 NOTE — PROGRESS NOTE ADULT - SUBJECTIVE AND OBJECTIVE BOX
Anesthesia Pain Management Service    SUBJECTIVE: Patient states she does not have much pain, and she is unsure if the IV PCA helps with her pain.    Pain Scale Score	At rest: __3/10_ 	With Activity: ___ 	[X ] Refer to charted pain scores    THERAPY:    [ ] IV PCA Morphine		[ ] 5 mg/mL	[ ] 1 mg/mL  [X ] IV PCA Hydromorphone	[ ] 5 mg/mL	[X ] 1 mg/mL  [ ] IV PCA Fentanyl		[ ] 50 micrograms/mL    Demand dose __0.2_ lockout __6_ (minutes) Continuous Rate _0__ Total: _4__   mg used (in past 24 hrs)      MEDICATIONS  (STANDING):  acetaminophen     Tablet .. 650 milliGRAM(s) Oral every 6 hours  albuterol    0.083% 2.5 milliGRAM(s) Nebulizer every 6 hours  aspirin enteric coated 81 milliGRAM(s) Oral daily  atorvastatin 20 milliGRAM(s) Oral at bedtime  bacitracin   Ointment 1 Application(s) Topical daily  heparin   Injectable 5000 Unit(s) SubCutaneous every 8 hours  lidocaine   4% Patch 1 Patch Transdermal <User Schedule>  multivitamin 1 Tablet(s) Oral daily  nebivolol 10 milliGRAM(s) Oral daily  polyethylene glycol 3350 17 Gram(s) Oral daily  senna 2 Tablet(s) Oral at bedtime    MEDICATIONS  (PRN):  HYDROmorphone  Injectable 0.3 milliGRAM(s) IV Push every 4 hours PRN Severe breakthrough Pain (7 - 10)  oxyCODONE    IR 5 milliGRAM(s) Oral every 4 hours PRN Moderate to Severe  Pain (4 - 10)      OBJECTIVE:  Patient is sitting up in chair with CTx1.    Sedation Score:	[ X] Alert	[ ] Drowsy 	[ ] Arousable	[ ] Asleep	[ ] Unresponsive    Side Effects:	[X ] None	[ ] Nausea	[ ] Vomiting	[ ] Pruritus  		[ ] Other:    Vital Signs Last 24 Hrs  T(C): 36.8 (22 May 2024 08:00), Max: 36.9 (21 May 2024 20:00)  T(F): 98.3 (22 May 2024 08:00), Max: 98.4 (21 May 2024 20:00)  HR: 68 (22 May 2024 10:00) (61 - 91)  BP: 121/54 (22 May 2024 10:00) (102/64 - 168/74)  BP(mean): 74 (22 May 2024 10:00) (72 - 105)  RR: 20 (22 May 2024 10:00) (12 - 22)  SpO2: 100% (22 May 2024 10:00) (93% - 100%)    Parameters below as of 22 May 2024 10:00  Patient On (Oxygen Delivery Method): room air        ASSESSMENT/ PLAN    Therapy to  be:	[ ] Continue   [ X] Discontinued   [X ] Change to prn Analgesics    Documentation and Verification of current medications:   [X] Done	[ ] Not done, not elligible    Comments: IV Dilaudid PCA discontinued. PRN Oral/IV opioids and/or Adjuvant non-opioid medication to be ordered at this point.    Progress Note written now but Patient was seen earlier.

## 2024-05-22 NOTE — DISCHARGE NOTE PROVIDER - DISCHARGE DATE
Complaining of intermittent   
No complaints today   No nausea or vomiting   Denies vaginal bleeding or cramping     Prenatal labs reviewed    General Pregnancy  recommendations given  PNV + H2O intake      Anatomy US at 20 weeks =Normal anatomy   Negative Diabetes screen  GBS = neg  SVE  1.5/40 % -3     Labor precautions     Wayne Boykin M.D.   OB/GYN      
23-May-2024

## 2024-05-22 NOTE — DISCHARGE NOTE PROVIDER - HOSPITAL COURSE
73 M, former smoker, w/ Hx of HTN, HLD, Depression, CKD Stage 3, BCC of face w/ 2.2cm AUGIE lesion, underwent FB, Robotic Assisted AUGIE Lingulectomy & MLND on 5/21/2024. On POD ____, chest tube was removed and follow up x-ray was reviewed. Pt is stable for discharge. 73 M, former smoker, w/ Hx of HTN, HLD, Depression, CKD Stage 3, BCC of face w/ 2.2cm AUGIE lesion, underwent FB, Robotic Assisted AUGIE Lingulectomy & MLND on 5/21/2024. On POD ____, chest tube was removed and follow up x-ray was reviewed. Pt is stable for discharge home with continued outpatient follow up, per Dr. Hoffmann. This 73 year old male former smoker with PMH of HTN, HLD, Depression, CKD Stage 3, and BCC of the face was found to have a 2.2cm AUGIE lesion for which he underwent a FB, Robotic Assisted AUGIE Lingulectomy & MLND on 5/21/2024. On POD 2 the chest tube was removed and a follow up x-ray was negative for PTX. The pt was stable for discharge home to  follow up with Dr. Hoffmann.

## 2024-05-23 ENCOUNTER — TRANSCRIPTION ENCOUNTER (OUTPATIENT)
Age: 74
End: 2024-05-23

## 2024-05-23 VITALS
TEMPERATURE: 98 F | RESPIRATION RATE: 18 BRPM | HEART RATE: 68 BPM | OXYGEN SATURATION: 95 % | SYSTOLIC BLOOD PRESSURE: 145 MMHG | DIASTOLIC BLOOD PRESSURE: 68 MMHG

## 2024-05-23 LAB
ANION GAP SERPL CALC-SCNC: 12 MMOL/L — SIGNIFICANT CHANGE UP (ref 7–14)
BUN SERPL-MCNC: 19 MG/DL — SIGNIFICANT CHANGE UP (ref 7–23)
CALCIUM SERPL-MCNC: 9.5 MG/DL — SIGNIFICANT CHANGE UP (ref 8.4–10.5)
CHLORIDE SERPL-SCNC: 103 MMOL/L — SIGNIFICANT CHANGE UP (ref 98–107)
CO2 SERPL-SCNC: 24 MMOL/L — SIGNIFICANT CHANGE UP (ref 22–31)
CREAT SERPL-MCNC: 1.3 MG/DL — SIGNIFICANT CHANGE UP (ref 0.5–1.3)
EGFR: 58 ML/MIN/1.73M2 — LOW
GLUCOSE SERPL-MCNC: 117 MG/DL — HIGH (ref 70–99)
HCT VFR BLD CALC: 39.7 % — SIGNIFICANT CHANGE UP (ref 39–50)
HGB BLD-MCNC: 13.4 G/DL — SIGNIFICANT CHANGE UP (ref 13–17)
MAGNESIUM SERPL-MCNC: 1.9 MG/DL — SIGNIFICANT CHANGE UP (ref 1.6–2.6)
MCHC RBC-ENTMCNC: 30.6 PG — SIGNIFICANT CHANGE UP (ref 27–34)
MCHC RBC-ENTMCNC: 33.8 GM/DL — SIGNIFICANT CHANGE UP (ref 32–36)
MCV RBC AUTO: 90.6 FL — SIGNIFICANT CHANGE UP (ref 80–100)
NRBC # BLD: 0 /100 WBCS — SIGNIFICANT CHANGE UP (ref 0–0)
NRBC # FLD: 0 K/UL — SIGNIFICANT CHANGE UP (ref 0–0)
PHOSPHATE SERPL-MCNC: 2.1 MG/DL — LOW (ref 2.5–4.5)
PLATELET # BLD AUTO: 107 K/UL — LOW (ref 150–400)
POTASSIUM SERPL-MCNC: 3.8 MMOL/L — SIGNIFICANT CHANGE UP (ref 3.5–5.3)
POTASSIUM SERPL-SCNC: 3.8 MMOL/L — SIGNIFICANT CHANGE UP (ref 3.5–5.3)
RBC # BLD: 4.38 M/UL — SIGNIFICANT CHANGE UP (ref 4.2–5.8)
RBC # FLD: 13.7 % — SIGNIFICANT CHANGE UP (ref 10.3–14.5)
SODIUM SERPL-SCNC: 139 MMOL/L — SIGNIFICANT CHANGE UP (ref 135–145)
WBC # BLD: 8.36 K/UL — SIGNIFICANT CHANGE UP (ref 3.8–10.5)
WBC # FLD AUTO: 8.36 K/UL — SIGNIFICANT CHANGE UP (ref 3.8–10.5)

## 2024-05-23 PROCEDURE — 71045 X-RAY EXAM CHEST 1 VIEW: CPT | Mod: 26

## 2024-05-23 RX ORDER — POLYETHYLENE GLYCOL 3350 17 G/17G
17 POWDER, FOR SOLUTION ORAL
Qty: 0 | Refills: 0 | DISCHARGE
Start: 2024-05-23

## 2024-05-23 RX ORDER — ACETAMINOPHEN 500 MG
2 TABLET ORAL
Qty: 0 | Refills: 0 | DISCHARGE
Start: 2024-05-23

## 2024-05-23 RX ORDER — BACITRACIN ZINC 500 UNIT/G
1 OINTMENT IN PACKET (EA) TOPICAL
Qty: 0 | Refills: 0 | DISCHARGE
Start: 2024-05-23

## 2024-05-23 RX ORDER — SENNA PLUS 8.6 MG/1
2 TABLET ORAL
Qty: 0 | Refills: 0 | DISCHARGE
Start: 2024-05-23

## 2024-05-23 RX ORDER — OXYCODONE HYDROCHLORIDE 5 MG/1
1 TABLET ORAL
Qty: 20 | Refills: 0
Start: 2024-05-23

## 2024-05-23 RX ADMIN — Medication 650 MILLIGRAM(S): at 01:23

## 2024-05-23 RX ADMIN — HEPARIN SODIUM 5000 UNIT(S): 5000 INJECTION INTRAVENOUS; SUBCUTANEOUS at 06:01

## 2024-05-23 RX ADMIN — ALBUTEROL 2.5 MILLIGRAM(S): 90 AEROSOL, METERED ORAL at 09:45

## 2024-05-23 RX ADMIN — Medication 650 MILLIGRAM(S): at 13:03

## 2024-05-23 RX ADMIN — LIDOCAINE 1 PATCH: 4 CREAM TOPICAL at 17:31

## 2024-05-23 RX ADMIN — OXYCODONE HYDROCHLORIDE 5 MILLIGRAM(S): 5 TABLET ORAL at 10:28

## 2024-05-23 RX ADMIN — POLYETHYLENE GLYCOL 3350 17 GRAM(S): 17 POWDER, FOR SOLUTION ORAL at 12:33

## 2024-05-23 RX ADMIN — Medication 81 MILLIGRAM(S): at 12:33

## 2024-05-23 RX ADMIN — OXYCODONE HYDROCHLORIDE 5 MILLIGRAM(S): 5 TABLET ORAL at 17:07

## 2024-05-23 RX ADMIN — Medication 1 TABLET(S): at 12:33

## 2024-05-23 RX ADMIN — HEPARIN SODIUM 5000 UNIT(S): 5000 INJECTION INTRAVENOUS; SUBCUTANEOUS at 13:36

## 2024-05-23 RX ADMIN — Medication 650 MILLIGRAM(S): at 17:30

## 2024-05-23 RX ADMIN — Medication 1 APPLICATION(S): at 12:34

## 2024-05-23 RX ADMIN — OXYCODONE HYDROCHLORIDE 5 MILLIGRAM(S): 5 TABLET ORAL at 10:58

## 2024-05-23 RX ADMIN — ALBUTEROL 2.5 MILLIGRAM(S): 90 AEROSOL, METERED ORAL at 15:46

## 2024-05-23 RX ADMIN — Medication 650 MILLIGRAM(S): at 12:33

## 2024-05-23 RX ADMIN — Medication 650 MILLIGRAM(S): at 06:00

## 2024-05-23 RX ADMIN — OXYCODONE HYDROCHLORIDE 5 MILLIGRAM(S): 5 TABLET ORAL at 17:39

## 2024-05-23 RX ADMIN — Medication 650 MILLIGRAM(S): at 00:53

## 2024-05-23 RX ADMIN — NEBIVOLOL HYDROCHLORIDE 5 MILLIGRAM(S): 5 TABLET ORAL at 06:00

## 2024-05-23 RX ADMIN — ALBUTEROL 2.5 MILLIGRAM(S): 90 AEROSOL, METERED ORAL at 05:03

## 2024-05-23 NOTE — PROGRESS NOTE ADULT - NS ATTEND AMEND GEN_ALL_CORE FT
Patient seen and examined. Agree with plan as detailed in PA/NP Note.     C/w bystolic for BP    Samuel Mitchell MD  Pager: [phone]

## 2024-05-23 NOTE — PROGRESS NOTE ADULT - SUBJECTIVE AND OBJECTIVE BOX
Date of service 5/23/24    extended hpi: 73 yr old male with PMH of HTN, HLD, CKD stage III, Renal artery stenosis, BCC on face (s/p excision), presented for Flexible bronchoscopy, left VATS, robotic assisted lung resection he is now s/p Left lingular nodule s/p Flexible Bronchoscopy, L VATS, robotic lingulectomy    no pain or SOB    Review of Systems:   Constitutional: [ ] fevers, [ ] chills.   Skin: [ ] dry skin. [ ] rashes.  Psychiatric: [ ] depression, [ ] anxiety.   Gastrointestinal: [ ] BRBPR, [ ] melena.   Neurological: [ ] confusion. [ ] seizures. [ ] shuffling gait.   Ears,Nose,Mouth and Throat: [ ] ear pain [ ] sore throat.   Eyes: [ ] diplopia.   Respiratory: [ ] hemoptysis. [ ] shortness of breath  Cardiovascular: See HPI above  Hematologic/Lymphatic: [ ] anemia. [ ] painful nodes. [ ] prolonged bleeding.   Genitourinary: [ ] hematuria. [ ] flank pain.   Endocrine: [ ] significant change in weight. [ ] intolerance to heat and cold.     Review of systems [x ] otherwise negative, [ ] otherwise unable to obtain    FH: no family history of sudden cardiac death in first degree relatives    SH: [ ] tobacco, [ ] alcohol, [ ] drugs    acetaminophen     Tablet .. 650 milliGRAM(s) Oral every 6 hours  albuterol    0.083% 2.5 milliGRAM(s) Nebulizer every 6 hours  aspirin enteric coated 81 milliGRAM(s) Oral daily  atorvastatin 20 milliGRAM(s) Oral at bedtime  bacitracin   Ointment 1 Application(s) Topical daily  heparin   Injectable 5000 Unit(s) SubCutaneous every 8 hours  HYDROmorphone  Injectable 0.3 milliGRAM(s) IV Push every 4 hours PRN  lidocaine   4% Patch 1 Patch Transdermal <User Schedule>  multivitamin 1 Tablet(s) Oral daily  nebivolol 5 milliGRAM(s) Oral daily  oxyCODONE    IR 5 milliGRAM(s) Oral every 4 hours PRN  polyethylene glycol 3350 17 Gram(s) Oral daily  senna 2 Tablet(s) Oral at bedtime                            13.4   8.36  )-----------( 107      ( 23 May 2024 05:52 )             39.7     139  |  103  |  19  ----------------------------<  117<H>  3.8   |  24  |  1.30    Ca    9.5      23 May 2024 05:52  Phos  2.1     05-23  Mg     1.90     05-23      T(C): 36.9 (05-23-24 @ 12:00), Max: 37.3 (05-22-24 @ 20:14)  HR: 69 (05-23-24 @ 12:00) (57 - 87)  BP: 130/68 (05-23-24 @ 12:00) (130/68 - 154/70)  RR: 18 (05-23-24 @ 12:00) (18 - 18)  SpO2: 95% (05-23-24 @ 12:00) (91% - 100%)  Wt(kg): --    I&O's Summary    22 May 2024 07:01  -  23 May 2024 07:00  --------------------------------------------------------  IN: 900 mL / OUT: 2010 mL / NET: -1110 mL        General: Well nourished in no acute distress. Alert and Oriented * 3.   Head: Normocephalic and atraumatic.   Neck: No JVD. No bruits. Supple. Does not appear to be enlarged.   Cardiovascular: + S1,S2 ; RRR Soft systolic murmur at the left lower sternal border. No rubs noted.    Lungs: CTA b/l. No rhonchi, rales or wheezes.   Abdomen: + BS, soft. Non tender. Non distended. No rebound. No guarding.   Extremities: No clubbing/cyanosis/edema.   Neurologic: Moves all four extremities. Full range of motion.   Skin: Warm and moist. The patient's skin has normal elasticity and good skin turgor.   Psychiatric: Appropriate mood and affect.  Musculoskeletal: Normal range of motion, normal strength    DATA      TELEMETRY: 	NSR      ASSESSMENT/PLAN: Pt is a 73 yr old male with PMH of HTN, HLD, CKD stage III, Renal artery stenosis, BCC on face (s/p excision), presented for Flexible bronchoscopy, left VATS, robotic assisted lung resection he is now s/p Left lingular nodule s/p Flexible Bronchoscopy, L VATS, robotic lingulectomy,    - Tele stable  - BP controlled  - On Losartan, Bystolic and HCTZ at home, ok to resume one at at time.  Cont Bystolic  - Normal EF on recent TTE and normal perfusion on stress done 01/2022  - pain management/PT

## 2024-05-23 NOTE — PROGRESS NOTE ADULT - ASSESSMENT
73 yr old male with PMH of HTN, HLD, CKD stage III, Renal artery stenosis, BCC on face (s/p excision), presented for Flexible bronchoscopy, left VATS, robotic assisted lung resection he is now s/p Left lingular nodule s/p Flexible Bronchoscopy, L VATS, robotic lingulectomy,    - Tele stable  - pain controlled  - on the floor now  - BP controlled  - On Losartan, Bystolic and HCTZ at home, now Bystolic resumed, keep resumed outptn meds as tolerated. can resume losartan  - Normal EF on recent TTE and normal perfusion on stress done 01/2022  - dvt ppx w HSC

## 2024-05-23 NOTE — DISCHARGE NOTE NURSING/CASE MANAGEMENT/SOCIAL WORK - PATIENT PORTAL LINK FT
You can access the FollowMyHealth Patient Portal offered by [facility] by registering at the following website: [email or web address]. By joining [employer]’s FollowMyHealth portal, you will also be able to view your health information using other applications (apps) compatible with our system.

## 2024-05-23 NOTE — DISCHARGE NOTE NURSING/CASE MANAGEMENT/SOCIAL WORK - NSDCPETBCESMAN_GEN_ALL_CORE
If you are a smoker, it is important for your health to stop smoking. Please be aware that second hand smoke is also harmful. [Fever] : no fever [Chills] : no chills [Fatigue] : no fatigue [Night Sweats] : no night sweats [Recent Change In Weight] : ~T no recent weight change [Discharge] : no discharge [Pain] : no pain [Redness] : no redness [Dryness] : no dryness [Vision Problems] : no vision problems [Itching] : no itching [Earache] : no earache [Hearing Loss] : no hearing loss [Nosebleeds] : no nosebleeds [Postnasal Drip] : no postnasal drip [Nasal Discharge] : no nasal discharge [Sore Throat] : no sore throat [Hoarseness] : no hoarseness [Chest Pain] : no chest pain [Palpitations] : no palpitations [Claudication] : no  leg claudication [Lower Ext Edema] : no lower extremity edema [Orthopena] : no orthopnea [Paroxysmal Nocturnal Dyspnea] : no paroxysmal nocturnal dyspnea [Shortness Of Breath] : no shortness of breath [Wheezing] : no wheezing [Cough] : no cough [Dyspnea on Exertion] : not dyspnea on exertion [Abdominal Pain] : no abdominal pain [Nausea] : no nausea [Constipation] : no constipation [Vomiting] : no vomiting [Heartburn] : no heartburn [Melena] : no melena [Dysuria] : no dysuria [Incontinence] : no incontinence [Hesitancy] : no hesitancy [Nocturia] : no nocturia [Hematuria] : no hematuria [Frequency] : no frequency [Impotence] : no impotency [Poor Libido] : libido not poor [Joint Pain] : no joint pain [Joint Stiffness] : no joint stiffness [Muscle Pain] : no muscle pain [Muscle Weakness] : no muscle weakness [Back Pain] : no back pain [Joint Swelling] : no joint swelling [Itching] : no itching [Mole Changes] : no mole changes [Nail Changes] : no nail changes [Hair Changes] : no hair changes [Skin Rash] : no skin rash [Headache] : no headache [Dizziness] : no dizziness [Fainting] : no fainting [Confusion] : no confusion [Unsteady Walk] : no ataxia [Memory Loss] : no memory loss [Suicidal] : not suicidal [Insomnia] : no insomnia [Anxiety] : no anxiety [Depression] : no depression

## 2024-05-23 NOTE — DISCHARGE NOTE NURSING/CASE MANAGEMENT/SOCIAL WORK - NSDCPEFALRISK_GEN_ALL_CORE
For information on Fall & Injury Prevention, visit: [email or web address] OR  [email or web address] OR  https://www.cdc.gov/steadi/patient.html

## 2024-05-23 NOTE — DISCHARGE NOTE NURSING/CASE MANAGEMENT/SOCIAL WORK - NSDCFUADDAPPT_GEN_ALL_CORE_FT
Follow up with Dr. Hoffmann in 10-14 days. Call Thoracic Surgery office [phone] to schedule an appointment. Please, obtain a CXR 1-2 days prior to your appointment and bring a copy with you.  Please, make an appointment with your Primary care provider.

## 2024-05-23 NOTE — PROGRESS NOTE ADULT - SUBJECTIVE AND OBJECTIVE BOX
Patient is a 73y old  Male who presents with a chief complaint of AUGIE Lingulectomy (22 May 2024 02:52)      SUBJECTIVE / OVERNIGHT EVENTS: no new events, seen on [facility]    MEDICATIONS  (STANDING):  acetaminophen     Tablet .. 650 milliGRAM(s) Oral every 6 hours  albuterol    0.083% 2.5 milliGRAM(s) Nebulizer every 6 hours  aspirin enteric coated 81 milliGRAM(s) Oral daily  atorvastatin 20 milliGRAM(s) Oral at bedtime  bacitracin   Ointment 1 Application(s) Topical daily  heparin   Injectable 5000 Unit(s) SubCutaneous every 8 hours  lidocaine   4% Patch 1 Patch Transdermal <User Schedule>  multivitamin 1 Tablet(s) Oral daily  nebivolol 5 milliGRAM(s) Oral daily  polyethylene glycol 3350 17 Gram(s) Oral daily  senna 2 Tablet(s) Oral at bedtime    MEDICATIONS  (PRN):  HYDROmorphone  Injectable 0.3 milliGRAM(s) IV Push every 4 hours PRN Severe breakthrough Pain (7 - 10)  oxyCODONE    IR 5 milliGRAM(s) Oral every 4 hours PRN Moderate to Severe  Pain (4 - 10)      Vital Signs Last 24 Hrs  T(F): 98.1 (05-23-24 @ 19:35), Max: 98.9 (05-23-24 @ 00:00)  HR: 68 (05-23-24 @ 19:35) (62 - 87)  BP: 145/68 (05-23-24 @ 19:35) (130/68 - 150/62)  RR: 18 (05-23-24 @ 19:35) (18 - 18)  SpO2: 95% (05-23-24 @ 19:35) (91% - 99%)  Telemetry:   CAPILLARY BLOOD GLUCOSE        I&O's Summary    22 May 2024 07:01  -  23 May 2024 07:00  --------------------------------------------------------  IN: 900 mL / OUT: 2010 mL / NET: -1110 mL    23 May 2024 07:01  -  23 May 2024 22:33  --------------------------------------------------------  IN: 0 mL / OUT: 10 mL / NET: -10 mL        PHYSICAL EXAM:  GENERAL: NAD, well-developed  HEAD:  Atraumatic, Normocephalic  EYES: EOMI, PERRLA, conjunctiva and sclera clear  NECK: Supple, No JVD  CHEST/LUNG: Clear to auscultation bilaterally; No wheeze  HEART: Regular rate and rhythm; No murmurs, rubs, or gallops  ABDOMEN: Soft, Nontender, Nondistended; Bowel sounds present  EXTREMITIES:  2+ Peripheral Pulses, No clubbing, cyanosis, or edema  PSYCH: AAOx3  NEUROLOGY: non-focal  SKIN: No rashes or lesions    LABS:                        13.4   8.36  )-----------( 107      ( 23 May 2024 05:52 )             39.7     05-23    139  |  103  |  19  ----------------------------<  117<H>  3.8   |  24  |  1.30    Ca    9.5      23 May 2024 05:52  Phos  2.1     05-23  Mg     1.90     05-23            Urinalysis Basic - ( 23 May 2024 05:52 )    Color: x / Appearance: x / SG: x / pH: x  Gluc: 117 mg/dL / Ketone: x  / Bili: x / Urobili: x   Blood: x / Protein: x / Nitrite: x   Leuk Esterase: x / RBC: x / WBC x   Sq Epi: x / Non Sq Epi: x / Bacteria: x        RADIOLOGY & ADDITIONAL TESTS:    Imaging Personally Reviewed:    Consultant(s) Notes Reviewed:      Care Discussed with Consultants/Other Providers:

## 2024-05-24 ENCOUNTER — NON-APPOINTMENT (OUTPATIENT)
Age: 74
End: 2024-05-24

## 2024-05-24 RX ORDER — OXYCODONE HYDROCHLORIDE 5 MG/1
1 TABLET ORAL
Qty: 20 | Refills: 0
Start: 2024-05-24

## 2024-05-29 RX ORDER — OXYCODONE AND ACETAMINOPHEN 5; 325 MG/1; MG/1
5-325 TABLET ORAL
Qty: 20 | Refills: 0 | Status: ACTIVE | COMMUNITY
Start: 2024-05-29 | End: 1900-01-01

## 2024-05-30 PROBLEM — R91.1 LUNG NODULE: Status: ACTIVE | Noted: 2024-03-26

## 2024-05-31 LAB — SURGICAL PATHOLOGY STUDY: SIGNIFICANT CHANGE UP

## 2024-06-04 ENCOUNTER — INPATIENT (INPATIENT)
Facility: HOSPITAL | Age: 74
LOS: 1 days | Discharge: HOME | End: 2024-06-06
Attending: THORACIC SURGERY (CARDIOTHORACIC VASCULAR SURGERY) | Admitting: THORACIC SURGERY (CARDIOTHORACIC VASCULAR SURGERY)
Payer: MEDICARE

## 2024-06-04 VITALS
RESPIRATION RATE: 18 BRPM | DIASTOLIC BLOOD PRESSURE: 85 MMHG | TEMPERATURE: 98 F | HEART RATE: 89 BPM | WEIGHT: 198.42 LBS | SYSTOLIC BLOOD PRESSURE: 136 MMHG | HEIGHT: 70 IN | OXYGEN SATURATION: 98 %

## 2024-06-04 DIAGNOSIS — J93.9 PNEUMOTHORAX, UNSPECIFIED: ICD-10-CM

## 2024-06-04 DIAGNOSIS — Z98.890 OTHER SPECIFIED POSTPROCEDURAL STATES: Chronic | ICD-10-CM

## 2024-06-04 LAB
ALBUMIN SERPL ELPH-MCNC: 4.1 G/DL — SIGNIFICANT CHANGE UP (ref 3.3–5)
ALP SERPL-CCNC: 82 U/L — SIGNIFICANT CHANGE UP (ref 40–120)
ALT FLD-CCNC: 36 U/L — SIGNIFICANT CHANGE UP (ref 4–41)
ANION GAP SERPL CALC-SCNC: 15 MMOL/L — HIGH (ref 7–14)
APTT BLD: 32.6 SEC — SIGNIFICANT CHANGE UP (ref 24.5–35.6)
AST SERPL-CCNC: 25 U/L — SIGNIFICANT CHANGE UP (ref 4–40)
BASOPHILS # BLD AUTO: 0.06 K/UL — SIGNIFICANT CHANGE UP (ref 0–0.2)
BASOPHILS NFR BLD AUTO: 0.7 % — SIGNIFICANT CHANGE UP (ref 0–2)
BILIRUB SERPL-MCNC: 0.4 MG/DL — SIGNIFICANT CHANGE UP (ref 0.2–1.2)
BLD GP AB SCN SERPL QL: NEGATIVE — SIGNIFICANT CHANGE UP
BUN SERPL-MCNC: 25 MG/DL — HIGH (ref 7–23)
CALCIUM SERPL-MCNC: 10.2 MG/DL — SIGNIFICANT CHANGE UP (ref 8.4–10.5)
CHLORIDE SERPL-SCNC: 100 MMOL/L — SIGNIFICANT CHANGE UP (ref 98–107)
CO2 SERPL-SCNC: 24 MMOL/L — SIGNIFICANT CHANGE UP (ref 22–31)
CREAT SERPL-MCNC: 1.45 MG/DL — HIGH (ref 0.5–1.3)
EGFR: 51 ML/MIN/1.73M2 — LOW
EOSINOPHIL # BLD AUTO: 0.42 K/UL — SIGNIFICANT CHANGE UP (ref 0–0.5)
EOSINOPHIL NFR BLD AUTO: 5.2 % — SIGNIFICANT CHANGE UP (ref 0–6)
GLUCOSE SERPL-MCNC: 113 MG/DL — HIGH (ref 70–99)
HCT VFR BLD CALC: 41.1 % — SIGNIFICANT CHANGE UP (ref 39–50)
HGB BLD-MCNC: 13.9 G/DL — SIGNIFICANT CHANGE UP (ref 13–17)
IANC: 5.48 K/UL — SIGNIFICANT CHANGE UP (ref 1.8–7.4)
IMM GRANULOCYTES NFR BLD AUTO: 0.5 % — SIGNIFICANT CHANGE UP (ref 0–0.9)
INR BLD: 1.06 RATIO — SIGNIFICANT CHANGE UP (ref 0.85–1.18)
LYMPHOCYTES # BLD AUTO: 1.58 K/UL — SIGNIFICANT CHANGE UP (ref 1–3.3)
LYMPHOCYTES # BLD AUTO: 19.5 % — SIGNIFICANT CHANGE UP (ref 13–44)
MCHC RBC-ENTMCNC: 30.1 PG — SIGNIFICANT CHANGE UP (ref 27–34)
MCHC RBC-ENTMCNC: 33.8 GM/DL — SIGNIFICANT CHANGE UP (ref 32–36)
MCV RBC AUTO: 89 FL — SIGNIFICANT CHANGE UP (ref 80–100)
MONOCYTES # BLD AUTO: 0.53 K/UL — SIGNIFICANT CHANGE UP (ref 0–0.9)
MONOCYTES NFR BLD AUTO: 6.5 % — SIGNIFICANT CHANGE UP (ref 2–14)
NEUTROPHILS # BLD AUTO: 5.48 K/UL — SIGNIFICANT CHANGE UP (ref 1.8–7.4)
NEUTROPHILS NFR BLD AUTO: 67.6 % — SIGNIFICANT CHANGE UP (ref 43–77)
NRBC # BLD: 0 /100 WBCS — SIGNIFICANT CHANGE UP (ref 0–0)
NRBC # FLD: 0 K/UL — SIGNIFICANT CHANGE UP (ref 0–0)
PLATELET # BLD AUTO: 245 K/UL — SIGNIFICANT CHANGE UP (ref 150–400)
POTASSIUM SERPL-MCNC: 3.7 MMOL/L — SIGNIFICANT CHANGE UP (ref 3.5–5.3)
POTASSIUM SERPL-SCNC: 3.7 MMOL/L — SIGNIFICANT CHANGE UP (ref 3.5–5.3)
PROT SERPL-MCNC: 7.1 G/DL — SIGNIFICANT CHANGE UP (ref 6–8.3)
PROTHROM AB SERPL-ACNC: 11.9 SEC — SIGNIFICANT CHANGE UP (ref 9.5–13)
RBC # BLD: 4.62 M/UL — SIGNIFICANT CHANGE UP (ref 4.2–5.8)
RBC # FLD: 12.8 % — SIGNIFICANT CHANGE UP (ref 10.3–14.5)
RH IG SCN BLD-IMP: POSITIVE — SIGNIFICANT CHANGE UP
SODIUM SERPL-SCNC: 139 MMOL/L — SIGNIFICANT CHANGE UP (ref 135–145)
WBC # BLD: 8.11 K/UL — SIGNIFICANT CHANGE UP (ref 3.8–10.5)
WBC # FLD AUTO: 8.11 K/UL — SIGNIFICANT CHANGE UP (ref 3.8–10.5)

## 2024-06-04 PROCEDURE — 99285 EMERGENCY DEPT VISIT HI MDM: CPT

## 2024-06-04 PROCEDURE — 71045 X-RAY EXAM CHEST 1 VIEW: CPT | Mod: 26

## 2024-06-04 PROCEDURE — 32556 INSERT CATH PLEURA W/O IMAGE: CPT | Mod: LT

## 2024-06-04 RX ORDER — POLYETHYLENE GLYCOL 3350 17 G/17G
17 POWDER, FOR SOLUTION ORAL DAILY
Refills: 0 | Status: DISCONTINUED | OUTPATIENT
Start: 2024-06-04 | End: 2024-06-06

## 2024-06-04 RX ORDER — ASPIRIN/CALCIUM CARB/MAGNESIUM 324 MG
81 TABLET ORAL DAILY
Refills: 0 | Status: DISCONTINUED | OUTPATIENT
Start: 2024-06-04 | End: 2024-06-06

## 2024-06-04 RX ORDER — ACETAMINOPHEN 500 MG
650 TABLET ORAL EVERY 6 HOURS
Refills: 0 | Status: DISCONTINUED | OUTPATIENT
Start: 2024-06-04 | End: 2024-06-06

## 2024-06-04 RX ORDER — SENNA PLUS 8.6 MG/1
2 TABLET ORAL AT BEDTIME
Refills: 0 | Status: DISCONTINUED | OUTPATIENT
Start: 2024-06-04 | End: 2024-06-06

## 2024-06-04 RX ORDER — HEPARIN SODIUM 5000 [USP'U]/ML
5000 INJECTION INTRAVENOUS; SUBCUTANEOUS EVERY 8 HOURS
Refills: 0 | Status: DISCONTINUED | OUTPATIENT
Start: 2024-06-04 | End: 2024-06-06

## 2024-06-04 RX ORDER — LOSARTAN POTASSIUM 100 MG/1
100 TABLET, FILM COATED ORAL DAILY
Refills: 0 | Status: DISCONTINUED | OUTPATIENT
Start: 2024-06-04 | End: 2024-06-06

## 2024-06-04 RX ORDER — BACITRACIN ZINC 500 UNIT/G
1 OINTMENT IN PACKET (EA) TOPICAL
Refills: 0 | Status: DISCONTINUED | OUTPATIENT
Start: 2024-06-04 | End: 2024-06-06

## 2024-06-04 RX ORDER — OXYCODONE HYDROCHLORIDE 5 MG/1
10 TABLET ORAL
Refills: 0 | Status: DISCONTINUED | OUTPATIENT
Start: 2024-06-04 | End: 2024-06-06

## 2024-06-04 RX ORDER — OXYCODONE HYDROCHLORIDE 5 MG/1
5 TABLET ORAL
Refills: 0 | Status: DISCONTINUED | OUTPATIENT
Start: 2024-06-04 | End: 2024-06-06

## 2024-06-04 RX ORDER — ATORVASTATIN CALCIUM 80 MG/1
20 TABLET, FILM COATED ORAL AT BEDTIME
Refills: 0 | Status: DISCONTINUED | OUTPATIENT
Start: 2024-06-04 | End: 2024-06-06

## 2024-06-04 RX ADMIN — HEPARIN SODIUM 5000 UNIT(S): 5000 INJECTION INTRAVENOUS; SUBCUTANEOUS at 22:47

## 2024-06-04 RX ADMIN — Medication 650 MILLIGRAM(S): at 22:52

## 2024-06-04 RX ADMIN — ATORVASTATIN CALCIUM 20 MILLIGRAM(S): 80 TABLET, FILM COATED ORAL at 22:47

## 2024-06-04 RX ADMIN — Medication 1 APPLICATION(S): at 17:55

## 2024-06-04 NOTE — H&P ADULT - HISTORY OF PRESENT ILLNESS
This 73 year old male former smoker with PMH of HTN, HLD, Depression, CKD Stage 3, and BCC of the face was found to have a 2.2cm AUGIE lesion for which he underwent a FB, Robotic Assisted AUGIE Lingulectomy & MLND on 5/21/2024. On POD 2 the chest tube was removed and a follow up x-ray was negative for PTX. Pt was discharged on 5/23/24. Yesterday pt obtained his routine post op CXR as an outpatient in anticipation of scheduled post op visit with Dr. Hoffmann this Thursday. CXR showed mod to large left ptx. Pt instructed to come to [facility] ER today for admission and left pigtail catheter placement. Pt seen and examined in ER. In no distress, on RA. VSS. Pt denies any new SOB or CP. States continued pain and discomfort at VATS site since surgery. Still taking 1-2 Percocets per day for pain. Denies any HA, fever, chills, cough, abd pain n.vd  or LE edema. States feeling of swelling, numbness and pain along anterior aspect of VATs site along left flank/abdomen.

## 2024-06-04 NOTE — ED PROVIDER NOTE - CLINICAL SUMMARY MEDICAL DECISION MAKING FREE TEXT BOX
74 yo M w/ recent lobectomy p/w c/o PTX in L lung seen on outpatient imaging. likely PTX after recent procedure. will consult thoracics, likely admit to their service.

## 2024-06-04 NOTE — H&P ADULT - ASSESSMENT
This 73 year old male former smoker with PMH of HTN, HLD, Depression, CKD Stage 3, and BCC of the face was found to have a 2.2cm AUGIE lesion for which he underwent a FB, Robotic Assisted AUGIE Lingulectomy & MLND on 5/21/2024.  Now admitted to [facility] for left PTX.     Plan:  -Will plan to admit to [facility], Dr. Hoffmann  -Will place to left pigtail cath at bedside today  -Pain control  -Tele, pulse Ox  Above d/w Dr Hoffmann and ER staff

## 2024-06-04 NOTE — ED ADULT NURSE NOTE - NSFALLHARMRISKINTERV_ED_ALL_ED

## 2024-06-04 NOTE — ED ADULT NURSE NOTE - NSFALLRISKFACTORS_ED_ALL_ED
on ASA/Coagulation: Bleeding disorder either through use of anticoagulants or underlying clinical condition(s)

## 2024-06-04 NOTE — H&P ADULT - NSHPPHYSICALEXAM_GEN_ALL_CORE
General: WD NAD  Neurology: A&Ox3, nonfocal, WILCOX x 4  Eyes: PERRLA/ EOMI, Gross vision intact  ENT/Neck: Neck supple, trachea midline, No JVD, Gross hearing intact  Respiratory: CTA B/L, No wheezing, rales, rhonchi. slight dec'd left side.   CV: RRR, S1S2, no murmurs, rubs or gallops  Abdominal: Soft, NT, ND +BS, no BM  Extremities: No edema, + peripheral pulses  Skin: No Rashes, Hematoma, Ecchymosis  Incisions: left VATS  c/d/i, suture in place from previous CT.

## 2024-06-04 NOTE — H&P ADULT - NSICDXPASTMEDICALHX_GEN_ALL_CORE_FT
Patient called requesting status of Preauthorization. Patient requesting a call back   PAST MEDICAL HISTORY:  Anxiety and depression     Aortic aneurysm of unspecified site, without rupture     Basal cell carcinoma     H/O aortic aneurysm     History of BPH     HTN (hypertension) 10 years    Hyperlipidemia 5 years    Renal artery stenosis     Renal calculi     Stage 3 chronic kidney disease

## 2024-06-04 NOTE — PATIENT PROFILE ADULT - FALL HARM RISK - RISK INTERVENTIONS
Assistance OOB with selected safe patient handling equipment/Assistance with ambulation/Communicate Fall Risk and Risk Factors to all staff, patient, and family/Monitor gait and stability/Reinforce activity limits and safety measures with patient and family/Sit up slowly, dangle for a short time, stand at bedside before walking/Use of alarms - bed, chair and/or voice tab/Visual Cue: Yellow wristband/Bed in lowest position, wheels locked, appropriate side rails in place/Call bell, personal items and telephone in reach/Instruct patient to call for assistance before getting out of bed or chair/Non-slip footwear when patient is out of bed/Maurice to call system/Physically safe environment - no spills, clutter or unnecessary equipment/Purposeful Proactive Rounding/Room/bathroom lighting operational, light cord in reach

## 2024-06-04 NOTE — ED PROVIDER NOTE - OBJECTIVE STATEMENT
72 yo M w/ recent lobectomy p/w c/o PTX in L lung seen on outpatient imaging. Pt denies CP or dyspnea. No fever, chills, N/V/D. 74 yo M w/ recent lobectomy p/w c/o PTX in L lung seen on outpatient imaging. Pt denies CP or dyspnea. No fever, chills, N/V/D. Pt sent in by Dr. Hoffmann for possible chest tube.

## 2024-06-04 NOTE — ED PROVIDER NOTE - PHYSICAL EXAMINATION
General: well-appearing, no acute distress  Head: Atraumatic, normocephalic  Eyes: EOM grossly in tact, no scleral icterus, no discharge  ENT: moist mucous membranes  Neurology: A&Ox 3, nonfocal, WILCOX x 4  Respiratory: normal breath sounds on R, decreased breath sounds on L lung  healed surgical incision on L back clean/dry and intact  CV: RRR, good s1/s2, no S3, Extremities warm and well perfused  Abdominal: Soft, non-distended  Skin: warm and dry. No rashes

## 2024-06-04 NOTE — ED ADULT NURSE NOTE - NS ED NURSE REPORT GIVEN TO FT
10/23/2023  Sanam Saab is a 48 y.o., female.  Normal sinus rhythm   Normal ECG   No previous ECGs available   Confirmed by MD DAVONTE, LUBA ([id number]) on 8/15/2019 7:09:38 PM   Past Medical History:   Diagnosis Date    H. 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     Past Surgical History:   Procedure Laterality Date    AUGMENTATION OF BREAST      BREAST SURGERY Bilateral     Augmentation    COLD KNIFE CONIZATION OF CERVIX N/A 05/01/2019    Procedure: CONE BIOPSY, CERVIX, USING COLD KNIFE;  Surgeon: Jun Garcia MD;  Location: [facility] OR;  Service: OB/GYN;  Laterality: N/A;    EXAMINATION UNDER ANESTHESIA N/A 05/01/2019    Procedure: Exam under anesthesia;  Surgeon: Jun Garcia MD;  Location: [facility] OR;  Service: OB/GYN;  Laterality: N/A;    HYSTERECTOMY  2019    ROBOT-ASSISTED LAPAROSCOPIC ABDOMINAL HYSTERECTOMY USING DA ROBSON XI N/A 08/21/2019    Procedure: XI ROBOTIC HYSTERECTOMY;  Surgeon: Jun Garcia MD;  Location: [facility] OR;  Service: OB/GYN;  Laterality: N/A;    ROBOT-ASSISTED LAPAROSCOPIC SURGICAL REMOVAL OF OVARY USING DA ROBSON XI Right 08/21/2019    Procedure: XI ROBOTIC OOPHORECTOMY;  Surgeon: Jun Garcia MD;  Location: [facility] OR;  Service: OB/GYN;  Laterality: Right;    ROBOT-ASSISTED SURGICAL REMOVAL OF FALLOPIAN TUBE USING DA ROBSON XI Bilateral 08/21/2019    Procedure: XI ROBOTIC SALPINGECTOMY;  Surgeon: Jun Garcia MD;  Location: [facility] OR;  Service: OB/GYN;  Laterality: Bilateral;   No current facility-administered medications for this encounter.    Current Outpatient Medications:     clonazePAM (KLONOPIN) 0.5 MG tablet, Take 0.5-1 tablets (0.25-0.5 mg total) by mouth daily as needed for Anxiety., Disp: 30 tablet, Rfl: 2    pantoprazole (PROTONIX) 40 MG tablet, Take 1 tablet (40 mg total) by mouth once daily., Disp: 90 tablet, Rfl: 1    spironolactone  (ALDACTONE) 50 MG tablet, Take 50 mg by mouth. PRN, Disp: , Rfl:           Pre-op Assessment    I have reviewed the Patient Summary Reports.     I have reviewed the Nursing Notes. I have reviewed the NPO Status.   I have reviewed the Medications.     Review of Systems  Anesthesia Hx:  No problems with previous Anesthesia  Neg history of prior surgery. Denies Family Hx of Anesthesia complications.   Denies Personal Hx of Anesthesia complications.   Social:  Non-Smoker, Social Alcohol Use    Hematology/Oncology:  Hematology Normal        Cardiovascular:  Cardiovascular Normal     Pulmonary:  Pulmonary Normal    Hepatic/GI:   GERD    Psych:   Psychiatric History anxiety          Physical Exam  General: Well nourished    Airway:  Mallampati: I / I  Mouth Opening: Normal  TM Distance: Normal  Tongue: Normal  Neck ROM: Normal ROM    Dental:  Intact        Anesthesia Plan  Type of Anesthesia, risks & benefits discussed:    Anesthesia Type: Gen Natural Airway  Intra-op Monitoring Plan: Standard ASA Monitors  Post Op Pain Control Plan: multimodal analgesia  Induction:  IV  Informed Consent: Informed consent signed with the Patient and all parties understand the risks and agree with anesthesia plan.  All questions answered.   ASA Score: 2  Day of Surgery Review of History & Physical: H&P Update referred to the surgeon/provider.    Ready For Surgery From Anesthesia Perspective.     .       Néstor RN

## 2024-06-04 NOTE — H&P ADULT - NSHPLABSRESULTS_GEN_ALL_CORE
XAM: Portable chest    FINDINGS:    Moderate to large left pneumothorax with the apical pleural line at the   level of the third rib interspace. Lungs show no focal abnormalities.  The heart is not enlarged.    No effusion.        COMPARISON: May 23, 2024        IMPRESSION: Left pneumothorax.    --- End of Report ---            TOM PEREZ MD; Attending Radiologist  This document has been electronically signed. Jun 4 2024 12:57PM

## 2024-06-04 NOTE — ED ADULT TRIAGE NOTE - CHIEF COMPLAINT QUOTE
pt s/p partial lT lobectomy 2 weeks ago, c/o of lt sided pain and mild sob for few days, x rays show pneumothorax

## 2024-06-04 NOTE — ED PROVIDER NOTE - ATTENDING CONTRIBUTION TO CARE
73M w/ recent lobectomy p/w PTX in L lung seen on outpatient imaging. Pt denies any symptoms leading to obtaining the CXR - had gotten per his surgeon's request prior to his post-op follow-up appointment scheduled for this week.  Gen: awake and alert, nad  CV: rrr, no appreciable murmur  Pulm: normal breath sounds on R, decreased breath sounds on L lung  Abd: soft, ntnd  Ext: no edema/swelling  Skin: healed surgical incision on L back clean/dry and intact  MDM: 73M w/ recent lobectomy p/w PTX in L lung seen on outpatient imaging. Discussed with thoracic surgery - plan for admission. Requesting pre-op labs, no additional imaging required in ED per CT surgery team.

## 2024-06-04 NOTE — ED ADULT NURSE NOTE - OBJECTIVE STATEMENT
pt received to trauma C A&Ox4, ambulatory with steady gait, accompanied by family member s/p partial left lobectomy approximately 2 weeks ago coming to the ED for c/o of left sided rib pain and mild sob x3-4 days. Patient states he was told his x rays show "popped lung." States that "he feels a balloon on the left side with a lot of pressure. Denies chest pain, fevers, chills, N/V/D, abdominal pain, urinary symptoms, dizziness, blurry vision, headache at this time. RR equal and unlabored, no acute respiratory distress noted. Airway intact, O2 sat 100% on room air. Patient abdomen soft, non-tender, mildly distended. Placed on cardiac monitor, NSR noted. Care plan continued. Comfort measures provided. Safety maintained. Awaiting further orders.

## 2024-06-05 ENCOUNTER — TRANSCRIPTION ENCOUNTER (OUTPATIENT)
Age: 74
End: 2024-06-05

## 2024-06-05 PROCEDURE — 71045 X-RAY EXAM CHEST 1 VIEW: CPT | Mod: 26

## 2024-06-05 PROCEDURE — 99231 SBSQ HOSP IP/OBS SF/LOW 25: CPT

## 2024-06-05 RX ADMIN — POLYETHYLENE GLYCOL 3350 17 GRAM(S): 17 POWDER, FOR SOLUTION ORAL at 11:43

## 2024-06-05 RX ADMIN — ATORVASTATIN CALCIUM 20 MILLIGRAM(S): 80 TABLET, FILM COATED ORAL at 21:57

## 2024-06-05 RX ADMIN — Medication 81 MILLIGRAM(S): at 11:45

## 2024-06-05 RX ADMIN — Medication 650 MILLIGRAM(S): at 09:30

## 2024-06-05 RX ADMIN — HEPARIN SODIUM 5000 UNIT(S): 5000 INJECTION INTRAVENOUS; SUBCUTANEOUS at 15:45

## 2024-06-05 RX ADMIN — LOSARTAN POTASSIUM 100 MILLIGRAM(S): 100 TABLET, FILM COATED ORAL at 05:16

## 2024-06-05 RX ADMIN — Medication 650 MILLIGRAM(S): at 00:00

## 2024-06-05 RX ADMIN — HEPARIN SODIUM 5000 UNIT(S): 5000 INJECTION INTRAVENOUS; SUBCUTANEOUS at 05:15

## 2024-06-05 RX ADMIN — Medication 1 APPLICATION(S): at 18:20

## 2024-06-05 RX ADMIN — Medication 1 TABLET(S): at 11:45

## 2024-06-05 RX ADMIN — HEPARIN SODIUM 5000 UNIT(S): 5000 INJECTION INTRAVENOUS; SUBCUTANEOUS at 21:57

## 2024-06-05 RX ADMIN — Medication 1 APPLICATION(S): at 05:15

## 2024-06-05 RX ADMIN — Medication 650 MILLIGRAM(S): at 10:00

## 2024-06-05 NOTE — PROGRESS NOTE ADULT - SUBJECTIVE AND OBJECTIVE BOX
Subjective: "I'm feeling better. I feel like the pain and swelling has gone down" No CP or SOB. OOB w minimal assist.     Vital Signs:  Vital Signs Last 24 Hrs  T(C): 36.9 (06-05-24 @ 04:10), Max: 37.3 (06-04-24 @ 11:38)  T(F): 98.4 (06-05-24 @ 04:10), Max: 99.2 (06-04-24 @ 11:38)  HR: 62 (06-05-24 @ 04:10) (62 - 89)  BP: 124/74 (06-05-24 @ 04:10) (124/74 - 151/81)  RR: 18 (06-05-24 @ 04:10) (18 - 18)  SpO2: 98% (06-05-24 @ 04:10) (95% - 100%) on (O2)    Telemetry/Alarms: SR  Relevant labs, radiology and Medications reviewed           CXR resolution of left ptx             13.9   8.11  )-----------( 245      ( 04 Jun 2024 11:50 )             41.1     06-04    139  |  100  |  25<H>  ----------------------------<  113<H>  3.7   |  24  |  1.45<H>    Ca    10.2      04 Jun 2024 11:50    TPro  7.1  /  Alb  4.1  /  TBili  0.4  /  DBili  x   /  AST  25  /  ALT  36  /  AlkPhos  82  06-04    PT/INR - ( 04 Jun 2024 11:50 )   PT: 11.9 sec;   INR: 1.06 ratio         PTT - ( 04 Jun 2024 11:50 )  PTT:32.6 sec  MEDICATIONS  (STANDING):  aspirin enteric coated 81 milliGRAM(s) Oral daily  atorvastatin 20 milliGRAM(s) Oral at bedtime  bacitracin   Ointment 1 Application(s) Topical two times a day  heparin   Injectable 5000 Unit(s) SubCutaneous every 8 hours  hydrochlorothiazide 12.5 milliGRAM(s) Oral daily  losartan 100 milliGRAM(s) Oral daily  multivitamin 1 Tablet(s) Oral daily  polyethylene glycol 3350 17 Gram(s) Oral daily  senna 2 Tablet(s) Oral at bedtime    MEDICATIONS  (PRN):  acetaminophen     Tablet .. 650 milliGRAM(s) Oral every 6 hours PRN Mild Pain (1 - 3)  oxyCODONE    IR 10 milliGRAM(s) Oral every 3 hours PRN Severe Pain (7 - 10)  oxyCODONE    IR 5 milliGRAM(s) Oral every 3 hours PRN Moderate Pain (4 - 6)    General: WD NAD  Neurology: A&Ox3, nonfocal, WILCOX x 4  Eyes: PERRLA/ EOMI, Gross vision intact  ENT/Neck: Neck supple, trachea midline, No JVD, Gross hearing intact  Respiratory: CTA B/L, No wheezing, rales, rhonchi  CV: RRR, S1S2, no murmurs, rubs or gallops  Abdominal: Soft, NT, ND +BS, + BM  Extremities: No edema, + peripheral pulses  Skin: No Rashes, Hematoma, Ecchymosis  Incisions: VATS healing  Tubes: left PTC- 160cc/24hrs on suction, no AL  I&O's Summary    04 Jun 2024 07:01  -  05 Jun 2024 07:00  --------------------------------------------------------  IN: 240 mL / OUT: 590 mL / NET: -350 mL      Assessment  73y Male  w/ PAST MEDICAL & SURGICAL HISTORY:  HTN (hypertension)  10 years      Hyperlipidemia  5 years      Stage 3 chronic kidney disease      Renal artery stenosis      H/O aortic aneurysm      Basal cell carcinoma      Anxiety and depression      Aortic aneurysm of unspecified site, without rupture      Renal calculi      History of BPH      H/O basal cell carcinoma excision      S/P rotator cuff surgery      History of bronchoscopy    This 73 year old male former smoker with PMH of HTN, HLD, Depression, CKD Stage 3, and BCC of the face was found to have a 2.2cm AUGIE lesion for which he underwent a FB, Robotic Assisted AUGIE Lingulectomy & MLND on 5/21/2024. On POD 2 the chest tube was removed and a follow up x-ray was negative for PTX. Pt was discharged on 5/23/24. Yesterday pt obtained his routine post op CXR as an outpatient in anticipation of scheduled post op visit with Dr. Hoffmann this Thursday. CXR showed mod to large left ptx. Pt instructed to come to [facility] ER today for admission and left pigtail catheter placement. Pt seen and examined in ER. In no distress, on RA. VSS. Pt denies any new SOB or CP. States continued pain and discomfort at VATS site since surgery. Still taking 1-2 Percocets per day for pain. Denies any HA, fever, chills, cough, abd pain n.vd  or LE edema. States feeling of swelling, numbness and pain along anterior aspect of VATs site along left flank/abdomen. 6/4- placement of left PTC at bedside, resolution of ptx.     PLAN  Neuro: Pain management  Pulm: Encourage coughing, deep breathing and use of incentive spirometry. Wean off supplemental oxygen as able. Daily CXR.   Cardio: Monitor telemetry/alarms  GI: Tolerating diet. Continue stool softeners.  Renal: monitor urine output, supplement electrolytes as needed  Vasc: Heparin SC/SCDs for DVT prophylaxis  Heme: Stable H/H. .   ID: Off antibiotics. Stable.  Therapy: OOB/ambulate  Tubes: Monitor Chest tube output. No air leak today. Will place on waterseal and repeat CXR  Disposition: Aim to D/C to home once PTC removed, possibly tomorrow.  Discussed with Cardiothoracic Team at AM rounds.

## 2024-06-06 ENCOUNTER — APPOINTMENT (OUTPATIENT)
Dept: THORACIC SURGERY | Facility: CLINIC | Age: 74
End: 2024-06-06

## 2024-06-06 ENCOUNTER — TRANSCRIPTION ENCOUNTER (OUTPATIENT)
Age: 74
End: 2024-06-06

## 2024-06-06 VITALS
RESPIRATION RATE: 18 BRPM | OXYGEN SATURATION: 99 % | TEMPERATURE: 98 F | HEART RATE: 70 BPM | DIASTOLIC BLOOD PRESSURE: 67 MMHG | SYSTOLIC BLOOD PRESSURE: 111 MMHG

## 2024-06-06 DIAGNOSIS — R91.1 SOLITARY PULMONARY NODULE: ICD-10-CM

## 2024-06-06 PROCEDURE — 71045 X-RAY EXAM CHEST 1 VIEW: CPT | Mod: 26

## 2024-06-06 RX ORDER — GABAPENTIN 400 MG/1
1 CAPSULE ORAL
Qty: 30 | Refills: 0
Start: 2024-06-06 | End: 2024-06-15

## 2024-06-06 RX ADMIN — Medication 81 MILLIGRAM(S): at 11:33

## 2024-06-06 RX ADMIN — Medication 1 TABLET(S): at 11:31

## 2024-06-06 RX ADMIN — LOSARTAN POTASSIUM 100 MILLIGRAM(S): 100 TABLET, FILM COATED ORAL at 05:34

## 2024-06-06 RX ADMIN — HEPARIN SODIUM 5000 UNIT(S): 5000 INJECTION INTRAVENOUS; SUBCUTANEOUS at 05:34

## 2024-06-06 RX ADMIN — Medication 1 APPLICATION(S): at 05:34

## 2024-06-06 RX ADMIN — HEPARIN SODIUM 5000 UNIT(S): 5000 INJECTION INTRAVENOUS; SUBCUTANEOUS at 15:13

## 2024-06-06 NOTE — DISCHARGE NOTE NURSING/CASE MANAGEMENT/SOCIAL WORK - PATIENT PORTAL LINK FT
You can access the FollowMyHealth Patient Portal offered by [facility] by registering at the following website: [email or web address]. By joining [employer]’s FollowMyHealth portal, you will also be able to view your health information using other applications (apps) compatible with our system.

## 2024-06-06 NOTE — DISCHARGE NOTE PROVIDER - HOSPITAL COURSE
73 year old male former smoker with PMH of HTN, HLD, Depression, CKD Stage 3, and BCC of the face was found to have a 2.2cm AUGIE lesion for which he underwent a FB, Robotic Assisted AUGIE Lingulectomy & MLND on 5/21/2024. On POD 2 the chest tube was removed and a follow up x-ray was negative for PTX. Pt was discharged on 5/23/24. Yesterday pt obtained his routine post op CXR as an outpatient in anticipation of scheduled post op visit with Dr. Hoffmann this Thursday. CXR showed mod to large left ptx. Pt instructed to come to [facility] ER today for admission and left pigtail catheter placement. Pt seen and examined in ER. In no distress, on RA. VSS. Pt denies any new SOB or CP. States continued pain and discomfort at VATS site since surgery. Still taking 1-2 Percocets per day for pain. Denies any HA, fever, chills, cough, abd pain n.vd  or LE edema. States feeling of swelling, numbness and pain along anterior aspect of VATs site along left flank/abdomen. Chest x-ray showing moderate left ptx, pigtail cath placed with resolution of ptx.  Patient stable for discharge home when chest tube removed and CXR stable.     73 year old male former smoker with PMH of HTN, HLD, Depression, CKD Stage 3, and BCC of the face was found to have a 2.2cm AUGIE lesion for which he underwent a FB, Robotic Assisted AUGIE Lingulectomy & MLND on 5/21/2024. On POD 2 the chest tube was removed and a follow up x-ray was negative for PTX. Pt was discharged on 5/23/24. Yesterday pt obtained his routine post op CXR as an outpatient in anticipation of scheduled post op visit with Dr. Hoffmann this Thursday. CXR showed mod to large left ptx. Pt instructed to come to [facility] ER today for admission and left pigtail catheter placement. Pt seen and examined in ER. In no distress, on RA. VSS. Pt denies any new SOB or CP. States continued pain and discomfort at VATS site since surgery. Still taking 1-2 Percocets per day for pain. Denies any HA, fever, chills, cough, abd pain n.vd  or LE edema. States feeling of swelling, numbness and pain along anterior aspect of VATs site along left flank/abdomen. Chest x-ray showing moderate left ptx, pigtail cath placed with resolution of ptx.  Patient  had successful clamp trial and was stable  for discharge home when chest tube removed and CXR stable.  Pt seen and examined by Dr Hoffmann on day of discharge.    Vital Signs Last 24 Hrs  T(C): 36.7 (06 Jun 2024 12:31), Max: 37.2 (06 Jun 2024 05:10)  T(F): 98.1 (06 Jun 2024 12:31), Max: 99 (06 Jun 2024 05:10)  HR: 85 (06 Jun 2024 12:31) (60 - 85)  BP: 129/65 (06 Jun 2024 12:31) (103/67 - 146/77)  BP(mean): --  RR: 18 (06 Jun 2024 12:31) (16 - 18)  SpO2: 97% (06 Jun 2024 12:31) (97% - 98%)    Parameters below as of 06 Jun 2024 12:31  Patient On (Oxygen Delivery Method): room air    PHYSICAL EXAM:  Gen: NAD  Resp: Respirations unlabored. Bilateral chest rise.   Card: RRR.   GI: Soft. Nontender. Nondistended.   Skin: Warm, well perfused, no masses or organomegaly  EXT: No clubbing, cyanosis, or edema  site c,d,i

## 2024-06-06 NOTE — DISCHARGE NOTE PROVIDER - NSDCMRMEDTOKEN_GEN_ALL_CORE_FT
acetaminophen 325 mg oral tablet: 2 tab(s) orally every 6 hours as needed for  mild pain  aspirin 81 mg oral delayed release tablet: 1 tab(s) orally once a day (in the morning)  atorvastatin 20 mg oral tablet: 1 tab(s) orally once a day (in the morning)  bacitracin 500 units/g topical ointment: 1 Apply topically to affected area once a day  losartan-hydroCHLOROthiazide 100 mg-12.5 mg oral tablet: 1 tab(s) orally once a day (in the morning)  Multiple Vitamins oral tablet: 1 tab(s) orally once a day  Nebivolol 10 mg oral tablet: 0.5 tab(s) orally once a day (in the morning)  oxyCODONE 5 mg oral tablet: 1 tab(s) orally every 4 hours as needed for Moderate to Severe  Pain (4 - 10) MDD: 6  polyethylene glycol 3350 oral powder for reconstitution: 17 gram(s) orally once a day  senna leaf extract oral tablet: 2 tab(s) orally once a day (at bedtime)  Vascepa 1 g oral capsule: 4 cap(s) orally once a day (in the morning)   acetaminophen 325 mg oral tablet: 2 tab(s) orally every 6 hours as needed for  mild pain  aspirin 81 mg oral delayed release tablet: 1 tab(s) orally once a day (in the morning)  atorvastatin 20 mg oral tablet: 1 tab(s) orally once a day (in the morning)  bacitracin 500 units/g topical ointment: 1 Apply topically to affected area once a day  CXR PA and lateral: R91.8  gabapentin 300 mg oral capsule: 1 cap(s) orally every 8 hours  losartan-hydroCHLOROthiazide 100 mg-12.5 mg oral tablet: 1 tab(s) orally once a day (in the morning)  Multiple Vitamins oral tablet: 1 tab(s) orally once a day  Nebivolol 10 mg oral tablet: 0.5 tab(s) orally once a day (in the morning)  oxyCODONE 5 mg oral tablet: 1 tab(s) orally every 4 hours as needed for Moderate to Severe  Pain (4 - 10) MDD: 6  polyethylene glycol 3350 oral powder for reconstitution: 17 gram(s) orally once a day  senna leaf extract oral tablet: 2 tab(s) orally once a day (at bedtime)  Vascepa 1 g oral capsule: 4 cap(s) orally once a day (in the morning)

## 2024-06-06 NOTE — DISCHARGE NOTE PROVIDER - CARE PROVIDER_API CALL
Erik Hoffmann  Thoracic Surgery  [address] [place] [id number]  Phone: [phone]  Fax: [phone]  Follow Up Time:

## 2024-06-06 NOTE — DISCHARGE NOTE PROVIDER - NSDCFUADDAPPT_GEN_ALL_CORE_FT
Follow up with Dr. Hoffmann  Chest x-ray 1-2 days prior to appointment with Dr. Hoffmann    Follow up with Dr. Hoffmann  have a chest xray prior to appointment and bring films  (you can arrange to have chest xray done on 2nd floor of [facility] before your follow up appointment on 3rd floor - speak with office staff to arrange)    [facility]  [phone]

## 2024-06-06 NOTE — DISCHARGE NOTE NURSING/CASE MANAGEMENT/SOCIAL WORK - NSDCFUADDAPPT_GEN_ALL_CORE_FT
Follow up with Dr. Hoffmann  have a chest xray prior to appointment and bring films  (you can arrange to have chest xray done on 2nd floor of [facility] before your follow up appointment on 3rd floor - speak with office staff to arrange)    [facility]  [phone]

## 2024-06-06 NOTE — DISCHARGE NOTE PROVIDER - NSDCFUSCHEDAPPT_GEN_ALL_CORE_FT
Erik Hoffmann  [facility] Physician [facility] [address]  Scheduled Appointment: 06/06/2024

## 2024-06-09 ENCOUNTER — NON-APPOINTMENT (OUTPATIENT)
Age: 74
End: 2024-06-09

## 2024-06-11 ENCOUNTER — OUTPATIENT (OUTPATIENT)
Dept: OUTPATIENT SERVICES | Facility: HOSPITAL | Age: 74
LOS: 1 days | Discharge: HOME | End: 2024-06-11

## 2024-06-11 DIAGNOSIS — D64.9 ANEMIA, UNSPECIFIED: ICD-10-CM

## 2024-06-11 DIAGNOSIS — Z98.890 OTHER SPECIFIED POSTPROCEDURAL STATES: Chronic | ICD-10-CM

## 2024-06-12 ENCOUNTER — RESULT REVIEW (OUTPATIENT)
Age: 74
End: 2024-06-12

## 2024-06-12 ENCOUNTER — OUTPATIENT (OUTPATIENT)
Dept: OUTPATIENT SERVICES | Facility: HOSPITAL | Age: 74
LOS: 1 days | Discharge: HOME | End: 2024-06-12
Payer: MEDICARE

## 2024-06-12 ENCOUNTER — APPOINTMENT (OUTPATIENT)
Dept: HEMATOLOGY ONCOLOGY | Facility: CLINIC | Age: 74
End: 2024-06-12
Payer: MEDICARE

## 2024-06-12 VITALS
DIASTOLIC BLOOD PRESSURE: 64 MMHG | OXYGEN SATURATION: 95 % | HEIGHT: 69 IN | WEIGHT: 197.06 LBS | SYSTOLIC BLOOD PRESSURE: 97 MMHG | HEART RATE: 78 BPM | BODY MASS INDEX: 29.19 KG/M2

## 2024-06-12 DIAGNOSIS — Z98.890 OTHER SPECIFIED POSTPROCEDURAL STATES: Chronic | ICD-10-CM

## 2024-06-12 LAB
BASOPHILS # BLD AUTO: 0.2 K/UL — SIGNIFICANT CHANGE UP (ref 0–0.2)
BASOPHILS NFR BLD AUTO: 1.4 % — SIGNIFICANT CHANGE UP (ref 0–2)
EOSINOPHIL # BLD AUTO: 0.6 K/UL — HIGH (ref 0–0.5)
EOSINOPHIL NFR BLD AUTO: 5.2 % — SIGNIFICANT CHANGE UP (ref 0–6)
HCT VFR BLD CALC: 42.8 % — SIGNIFICANT CHANGE UP (ref 39–50)
HGB BLD-MCNC: 14.4 G/DL — SIGNIFICANT CHANGE UP (ref 13–17)
LYMPHOCYTES # BLD AUTO: 1.6 K/UL — SIGNIFICANT CHANGE UP (ref 1–3.3)
LYMPHOCYTES # BLD AUTO: 14.1 % — SIGNIFICANT CHANGE UP (ref 13–44)
MCHC RBC-ENTMCNC: 30.7 PG — SIGNIFICANT CHANGE UP (ref 27–34)
MCHC RBC-ENTMCNC: 33.7 G/DL — SIGNIFICANT CHANGE UP (ref 32–36)
MCV RBC AUTO: 91.2 FL — SIGNIFICANT CHANGE UP (ref 80–100)
MONOCYTES # BLD AUTO: 0.7 K/UL — SIGNIFICANT CHANGE UP (ref 0–0.9)
MONOCYTES NFR BLD AUTO: 6.3 % — SIGNIFICANT CHANGE UP (ref 2–14)
NEUTROPHILS # BLD AUTO: 8.5 K/UL — HIGH (ref 1.8–7.4)
NEUTROPHILS NFR BLD AUTO: 73 % — SIGNIFICANT CHANGE UP (ref 43–77)
PLATELET # BLD AUTO: 207 K/UL — SIGNIFICANT CHANGE UP (ref 150–400)
RBC # BLD: 4.69 M/UL — SIGNIFICANT CHANGE UP (ref 4.2–5.8)
RBC # FLD: 11.9 % — SIGNIFICANT CHANGE UP (ref 10.3–14.5)
WBC # BLD: 11.6 K/UL — HIGH (ref 3.8–10.5)
WBC # FLD AUTO: 11.6 K/UL — HIGH (ref 3.8–10.5)

## 2024-06-12 PROCEDURE — G2212 PROLONG OUTPT/OFFICE VIS: CPT

## 2024-06-12 PROCEDURE — 99205 OFFICE O/P NEW HI 60 MIN: CPT

## 2024-06-12 PROCEDURE — G2211 COMPLEX E/M VISIT ADD ON: CPT

## 2024-06-13 ENCOUNTER — NON-APPOINTMENT (OUTPATIENT)
Age: 74
End: 2024-06-13

## 2024-06-13 NOTE — RESULTS/DATA
[FreeTextEntry1] :  5/22/2024 Pathology 6. 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 -PDL1 : Tumor Proportion Score (TPS*)   1-2% - 4/7 LN positive  1. Lymph node, level 7, excision: - Two lymph nodes negative for carcinoma (0/2)  2. Lymph node, level 5 #1, excision: - One lymph node positive for carcinoma (1/1)  3. Lymph node, level 5 #2, excision: - Two lymph nodes positive for carcinoma (2/2) - One lymph node with extranodal extension  4. Lymph node, level 11, excision: - One lymph node positive for carcinoma (1/1)   5. Lymph node, level 9, excision: - One lymph node negative for carcinoma (0/1)   PET/CT on 03/18/2024: (ZP) - There is intense FDG activity corresponding with a left upper lobe mass (SUV 12.1, 2.0 x 2.0 cm, series 3 image 101). This corresponds with the nodule described in this region on the recent CT chest of 3/7/2024. Malignancy is suspected. - There are intensely FDG avid mediastinal and bilateral hilar lymph nodes. For example a left subcarinal node (SUV 8.0, 1.7 x 1.0 cm, series 3 image 97); a left hilar node (SUV 6.4); and in the right hilar region (SUV 3.7). Metastatic lymphadenopathy is suspected.  CT chest on 03/07/2024: (ZP) - There is a lobulated spiculated 2.2 cm lesion in the left upper lobe abutting the major fissure (series 3 image 195), and a 6 mm right upper lobe nodule on the saved.

## 2024-06-13 NOTE — ASSESSMENT
[Future Reassessment of Pain Scale] : Future reassessment of pain scale    [With Patient/Caregiver] : With Patient/Caregiver [FreeTextEntry1] : 73 year M with PMHX of depression, CKD, HLD, HTN, former smoker (smoked age [age], 0.5 ppd) presents for initial consultation for Adenocarcinoma of the lung  Patient has CT chest performed on 03/07/2024: (ZP) revealed a lobulated spiculated 2.2 cm lesion in the left upper lobe abutting the major fissure and a 6 mm right upper lobe nodule  Subsequent PET/CT on 03/18/2024 reported an intense FDG activity corresponding with a left upper lobe mass (SUV 12.1, 2.0 x 2.0 cm). +  FDG avid mediastinal and bilateral hilar lymph nodes. Left subcarinal node (SUV 8.0, 1.7 x 1.0 cm); a left hilar node (SUV 6.4); and in the right hilar region (SUV 3.7).   Patient underwent Endobronchial ultrasound biopsy on 04/09/2024, Pathology was non diagnostic.  S/p Flex Bronch Robotic assisted left upper lobe lingulectomy on 5/21/24 # p T1cN2 adenocarcinoma left lung PDL1 1-2 % 4/7 LN +ve - extensive lymphovascular invasion present, visceral pleura invasion not identified, resection margin negative for tumor but is 1 mm from the margin after removal of the staple line,  - Discussed findings from the iMpower 010 and keynote 091 trial. Of note patient is practically a never smoker ( smoked 1/2ppd for 2 y as teenager ) will send NGS to await any mutational testing Discussed treatment with CarboAUC 5 / Pemetrexed 500mg/m2 for 4 cycles Discussed side effects of carboplatin including but not limited to Nausea/vomiting/myelosupression/fatigue. Pemetrexed has side effects including N/v/myelosupression/diarrhea/desquamation skin rash/Gave patient instructions for febrile neutropenia and to call the office if they were to develop a fever. -Chemo Consent signed - Folate 1mg daily while on pemetrexed -Dexamethasone 4mg bid day before day of and day after chemo (prescription sent to pharmacy) -vitamin B12 1,000 mcg IM 7 days Given today and then every 3 cycles while on therapy - Will send [facility] one to assess NGS, pt is a never smoker, would question if he has any targetable mutations - Ordered MRI brain - Will refer to [facility] [id number] stapled margin,  - F/u in 3 weeks to finalize treatment plans     [AdvancecareDate] : 6/12/24

## 2024-06-13 NOTE — HISTORY OF PRESENT ILLNESS
[de-identified] : KAYLEE WARNER is a 73 year M with PMHX of depression, CKD, HLD, HTN, former smoker ( smoked age [age]) presents for initial consultation for Adenocarcinoma of the lung  Patient has CT chest performed on 03/07/2024: (ZP) revealed a lobulated spiculated 2.2 cm lesion in the left upper lobe abutting the major fissure and a 6 mm right upper lobe nodule  Subsequent PET/CT on 03/18/2024 reported an intense FDG activity corresponding with a left upper lobe mass (SUV 12.1, 2.0 x 2.0 cm). This corresponds with the nodule described in this region on the recent CT chest of 3/7/2024.  There are intensely FDG avid mediastinal and bilateral hilar lymph nodes. For example a left subcarinal node (SUV 8.0, 1.7 x 1.0 cm); a left hilar node (SUV 6.4); and in the right hilar region (SUV 3.7). Metastatic lymphadenopathy is suspected.  Patient underwent Endobronchial ultrasound biopsy on 04/09/2024, Pathology was non diagnostic.  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 Moore Nephrologist: Dr. Mandujano  [de-identified] : Pt presents for initial visit with wife Denies autoimmune conditions Lives active lifestyle, walks about a mile a day Reports smoking when he was a teenager for a short time, about 0.5 ppd Pt reports not feeling great after surgery, reports discomfort in upper back area, but is able to still walk a mile a day Pt is retired, does not work Previously saw Hematologist in [place] for low platelets, did workup, and as long as he does not run too low he is fine Lives in [place]

## 2024-06-13 NOTE — ADDENDUM
[FreeTextEntry1] :  Documented by Bev Benton acting as scribe for Dr. Moore on  06/12/2024.   All Medical record entries made by the Scribe were at my, Dr. Moore's, direction and personally dictated by me on  06/12/2024. I have reviewed the chart and agree that the record accurately reflects my personal performance of the history, physical exam, assessment and plan. I have also personally directed, reviewed, and agreed with the discharge instructions.

## 2024-06-14 LAB
ALBUMIN SERPL ELPH-MCNC: 4.6 G/DL
ALP BLD-CCNC: 91 U/L
ALT SERPL-CCNC: 42 U/L
ANION GAP SERPL CALC-SCNC: 13 MMOL/L
AST SERPL-CCNC: 24 U/L
BILIRUB SERPL-MCNC: 0.5 MG/DL
BUN SERPL-MCNC: 29 MG/DL
CALCIUM SERPL-MCNC: 10.2 MG/DL
CEA SERPL-MCNC: 1.3 NG/ML
CHLORIDE SERPL-SCNC: 101 MMOL/L
CO2 SERPL-SCNC: 26 MMOL/L
CREAT SERPL-MCNC: 1.89 MG/DL
EGFR: 37 ML/MIN/1.73M2
GLUCOSE SERPL-MCNC: 122 MG/DL
HBV CORE IGG+IGM SER QL: NONREACTIVE
HBV SURFACE AB SER QL: NONREACTIVE
HBV SURFACE AG SER QL: NONREACTIVE
HCV AB SER QL: NONREACTIVE
HCV S/CO RATIO: 0.09 S/CO
POTASSIUM SERPL-SCNC: 4.3 MMOL/L
PROT SERPL-MCNC: 7.1 G/DL
SODIUM SERPL-SCNC: 140 MMOL/L

## 2024-06-20 ENCOUNTER — APPOINTMENT (OUTPATIENT)
Dept: MRI IMAGING | Facility: CLINIC | Age: 74
End: 2024-06-20
Payer: MEDICARE

## 2024-06-20 PROCEDURE — 70553 MRI BRAIN STEM W/O & W/DYE: CPT

## 2024-06-20 PROCEDURE — A9585: CPT | Mod: JW

## 2024-06-21 ENCOUNTER — RESULT REVIEW (OUTPATIENT)
Age: 74
End: 2024-06-21

## 2024-06-21 ENCOUNTER — APPOINTMENT (OUTPATIENT)
Dept: RADIOLOGY | Facility: CLINIC | Age: 74
End: 2024-06-21
Payer: MEDICARE

## 2024-06-21 ENCOUNTER — OUTPATIENT (OUTPATIENT)
Dept: OUTPATIENT SERVICES | Facility: HOSPITAL | Age: 74
LOS: 1 days | End: 2024-06-21
Payer: MEDICARE

## 2024-06-21 DIAGNOSIS — Z00.8 ENCOUNTER FOR OTHER GENERAL EXAMINATION: ICD-10-CM

## 2024-06-21 DIAGNOSIS — Z98.890 OTHER SPECIFIED POSTPROCEDURAL STATES: Chronic | ICD-10-CM

## 2024-06-21 PROCEDURE — 71046 X-RAY EXAM CHEST 2 VIEWS: CPT | Mod: 26

## 2024-06-21 PROCEDURE — 71046 X-RAY EXAM CHEST 2 VIEWS: CPT

## 2024-06-25 ENCOUNTER — APPOINTMENT (OUTPATIENT)
Dept: THORACIC SURGERY | Facility: CLINIC | Age: 74
End: 2024-06-25
Payer: MEDICARE

## 2024-06-25 VITALS
SYSTOLIC BLOOD PRESSURE: 136 MMHG | BODY MASS INDEX: 28.65 KG/M2 | WEIGHT: 194 LBS | OXYGEN SATURATION: 96 % | RESPIRATION RATE: 16 BRPM | DIASTOLIC BLOOD PRESSURE: 82 MMHG | HEART RATE: 65 BPM

## 2024-06-25 PROCEDURE — 99024 POSTOP FOLLOW-UP VISIT: CPT

## 2024-06-25 RX ORDER — OXYCODONE 5 MG/1
5 TABLET ORAL
Qty: 12 | Refills: 0 | Status: ACTIVE | COMMUNITY
Start: 2024-06-25 | End: 1900-01-01

## 2024-06-25 NOTE — REASON FOR VISIT
[Spouse] : spouse [de-identified] : Flex Bronch Robotic assisted left upper lobe lingulectomy [de-identified] : 5/21/24

## 2024-06-25 NOTE — DISCUSSION/SUMMARY
[Doing Well] : is doing well [Fair Pain Control] : has fair pain control [No Sign of Infection] : is showing no signs of infection [3] : 3 [de-identified] : taking Gabapentin 300mg QD, and Oxycodone and Tylenol as needed.

## 2024-06-25 NOTE — ASSESSMENT
[FreeTextEntry1] : Mr. KAYLEE WARNER, 73 year old male, former smoker, w/ hx of HTN, HLD, Depression, CKD Stage III, BCC of face, who c/o chest congestion, nasal congestion and cough, went to urgent care, found to have abnormal CXR, was given a course of ABX and steroid. Subsequent CT and PET showed AUGIE nodule and mediastinal lymphadenopathy. Referred by Thoracic Cancer Navigation for evaluation.   Patient is s/p Flexible bronchoscopy with bronchial lavage and then a bronchial ultrasound on 04/09/2024. *Cytopathology of subcarinal, paratracheal (left and right) is non-diagnostic.  *Cytopathology of BAL is negative for malignant cells. Thinprep show moderate cellularity composed of groups of uniform ciliated bronchial epithelial cells, scattered alveolar macrophages and mixed inflammatory cells.   Patient is s/p Flex bronchoscopy of left upper lobe lingulectomy with mediastinal lymph node dissection on 05/21/2024. 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 + Level 5 and Level 11. Extensive lymphovascular invasion present Visceral pleura invasion not identified. Resection margin negative for tumor but is 1 mm from the margin after removal of the staple line. PD-L1 1-2%.   Patient went back to ED on 06/04/2024 for left PTX. s/p pigtail placement and discharged on 06/06/2024.   Patient was established care with Dr. Meyers (Hem/Onc). Pending appointment with Dr. Anant Rae (Rad/Onc) on 6/26/24.   CXR on 6/21/24: No acute cardiopulmonary disease process.  I have reviewed the patient's medical records and diagnostic images at time of this office consultation and have made the following recommendation: 1. Path reviewed and explained to patient, recommended patient to f/u with Hem/Onc and Rad/Onc for chemo/XRT. 2. RTC as needed. 3. Refill Oxycodone (dispense 12 tablets).   I, AROLDO Rhodes, personally performed the evaluation and management (E/M) services for this established patient who presents today with (a) new problem(s)/exacerbation of (an) existing condition(s). That E/M includes conducting the examination, assessing all new/exacerbated conditions, and establishing a new plan of care. Today, my ACP, Carline Leggett NP was here to observe my evaluation and management services for this new problem/exacerbated condition to be followed going forward.

## 2024-06-25 NOTE — PHYSICAL EXAM
[] : no respiratory distress [Heart Rate And Rhythm] : heart rate was normal and rhythm regular [Auscultation Breath Sounds / Voice Sounds] : lungs were clear to auscultation bilaterally [Heart Sounds] : normal S1 and S2 [Heart Sounds Gallop] : no gallops [Murmurs] : no murmurs [Heart Sounds Pericardial Friction Rub] : no pericardial rub [Clean] : clean [Dry] : dry [Healing Well] : healing well

## 2024-06-26 ENCOUNTER — APPOINTMENT (OUTPATIENT)
Dept: RADIATION ONCOLOGY | Facility: CLINIC | Age: 74
End: 2024-06-26
Payer: MEDICARE

## 2024-06-26 VITALS
WEIGHT: 195 LBS | RESPIRATION RATE: 16 BRPM | OXYGEN SATURATION: 97 % | SYSTOLIC BLOOD PRESSURE: 115 MMHG | BODY MASS INDEX: 28.88 KG/M2 | HEIGHT: 69 IN | HEART RATE: 76 BPM | DIASTOLIC BLOOD PRESSURE: 76 MMHG

## 2024-06-26 DIAGNOSIS — R59.0 LOCALIZED ENLARGED LYMPH NODES: ICD-10-CM

## 2024-06-26 DIAGNOSIS — Z87.891 PERSONAL HISTORY OF NICOTINE DEPENDENCE: ICD-10-CM

## 2024-06-26 DIAGNOSIS — Z78.9 OTHER SPECIFIED HEALTH STATUS: ICD-10-CM

## 2024-06-26 DIAGNOSIS — C34.90 MALIGNANT NEOPLASM OF UNSPECIFIED PART OF UNSPECIFIED BRONCHUS OR LUNG: ICD-10-CM

## 2024-06-26 PROCEDURE — G2211 COMPLEX E/M VISIT ADD ON: CPT

## 2024-06-26 PROCEDURE — 99204 OFFICE O/P NEW MOD 45 MIN: CPT

## 2024-07-01 DIAGNOSIS — C34.12 MALIGNANT NEOPLASM OF UPPER LOBE, LEFT BRONCHUS OR LUNG: ICD-10-CM

## 2024-07-03 ENCOUNTER — APPOINTMENT (OUTPATIENT)
Dept: HEMATOLOGY ONCOLOGY | Facility: CLINIC | Age: 74
End: 2024-07-03
Payer: MEDICARE

## 2024-07-03 VITALS
TEMPERATURE: 97.8 F | RESPIRATION RATE: 18 BRPM | HEART RATE: 74 BPM | BODY MASS INDEX: 29.02 KG/M2 | DIASTOLIC BLOOD PRESSURE: 72 MMHG | SYSTOLIC BLOOD PRESSURE: 116 MMHG | OXYGEN SATURATION: 94 % | WEIGHT: 196.5 LBS

## 2024-07-03 DIAGNOSIS — R59.0 LOCALIZED ENLARGED LYMPH NODES: ICD-10-CM

## 2024-07-03 PROCEDURE — 99215 OFFICE O/P EST HI 40 MIN: CPT

## 2024-07-05 PROCEDURE — 77470 SPECIAL RADIATION TREATMENT: CPT | Mod: 26

## 2024-07-05 PROCEDURE — 77333 RADIATION TREATMENT AID(S): CPT | Mod: 26

## 2024-07-05 PROCEDURE — 77263 THER RADIOLOGY TX PLNG CPLX: CPT

## 2024-07-12 PROCEDURE — 77300 RADIATION THERAPY DOSE PLAN: CPT | Mod: 26

## 2024-07-12 PROCEDURE — 77301 RADIOTHERAPY DOSE PLAN IMRT: CPT | Mod: 26

## 2024-07-12 PROCEDURE — 77338 DESIGN MLC DEVICE FOR IMRT: CPT | Mod: 26

## 2024-07-17 ENCOUNTER — NON-APPOINTMENT (OUTPATIENT)
Age: 74
End: 2024-07-17

## 2024-07-18 PROCEDURE — 77280 THER RAD SIMULAJ FIELD SMPL: CPT | Mod: 26

## 2024-07-19 ENCOUNTER — APPOINTMENT (OUTPATIENT)
Dept: INTERNAL MEDICINE | Facility: CLINIC | Age: 74
End: 2024-07-19
Payer: MEDICARE

## 2024-07-19 VITALS — DIASTOLIC BLOOD PRESSURE: 73 MMHG | HEART RATE: 69 BPM | SYSTOLIC BLOOD PRESSURE: 137 MMHG

## 2024-07-19 DIAGNOSIS — R05.9 COUGH, UNSPECIFIED: ICD-10-CM

## 2024-07-19 DIAGNOSIS — E78.5 HYPERLIPIDEMIA, UNSPECIFIED: ICD-10-CM

## 2024-07-19 DIAGNOSIS — C34.90 MALIGNANT NEOPLASM OF UNSPECIFIED PART OF UNSPECIFIED BRONCHUS OR LUNG: ICD-10-CM

## 2024-07-19 DIAGNOSIS — I10 ESSENTIAL (PRIMARY) HYPERTENSION: ICD-10-CM

## 2024-07-19 DIAGNOSIS — K46.9 UNSPECIFIED ABDOMINAL HERNIA W/OUT OBSTRUCTION OR GANGRENE: ICD-10-CM

## 2024-07-19 PROCEDURE — 77427 RADIATION TX MANAGEMENT X5: CPT

## 2024-07-19 PROCEDURE — 99214 OFFICE O/P EST MOD 30 MIN: CPT

## 2024-07-19 PROCEDURE — G6002: CPT | Mod: 26

## 2024-07-22 ENCOUNTER — NON-APPOINTMENT (OUTPATIENT)
Age: 74
End: 2024-07-22

## 2024-07-22 VITALS
OXYGEN SATURATION: 99 % | DIASTOLIC BLOOD PRESSURE: 85 MMHG | HEART RATE: 68 BPM | RESPIRATION RATE: 16 BRPM | WEIGHT: 193 LBS | SYSTOLIC BLOOD PRESSURE: 129 MMHG | BODY MASS INDEX: 28.58 KG/M2 | HEIGHT: 69 IN

## 2024-07-22 PROCEDURE — 77014: CPT | Mod: 26

## 2024-07-22 NOTE — DISEASE MANAGEMENT
[Pathological] : TNM Stage: p [TTNM] : 1c [NTNM] : 2 [MTNM] : 0 [IIIA] : IIIA [de-identified] : 400cGy [de-identified] : 5400cGy [de-identified] : lung left

## 2024-07-23 PROCEDURE — G6002: CPT | Mod: 26

## 2024-07-24 PROCEDURE — G6002: CPT | Mod: 26

## 2024-07-25 ENCOUNTER — APPOINTMENT (OUTPATIENT)
Dept: SURGERY | Facility: CLINIC | Age: 74
End: 2024-07-25
Payer: MEDICARE

## 2024-07-25 VITALS
BODY MASS INDEX: 28.67 KG/M2 | RESPIRATION RATE: 16 BRPM | DIASTOLIC BLOOD PRESSURE: 82 MMHG | HEART RATE: 67 BPM | TEMPERATURE: 98.3 F | SYSTOLIC BLOOD PRESSURE: 135 MMHG | OXYGEN SATURATION: 96 % | WEIGHT: 198 LBS | HEIGHT: 69.5 IN

## 2024-07-25 DIAGNOSIS — R19.00 INTRA-ABDOMINAL AND PELVIC SWELLING, MASS AND LUMP, UNSPECIFIED SITE: ICD-10-CM

## 2024-07-25 PROCEDURE — G6002: CPT | Mod: 26

## 2024-07-25 PROCEDURE — 99205 OFFICE O/P NEW HI 60 MIN: CPT

## 2024-07-25 NOTE — ASSESSMENT
[FreeTextEntry1] : Mr. WARNER is a 73 year old man with left upper abdominal wall bulge. We discussed possible etiologies including abdominal wall hernia and abdominal wall denervation. I recommend CTAP to evaluate for abdominal wall hernia.

## 2024-07-25 NOTE — HISTORY OF PRESENT ILLNESS
[de-identified] : Mr. WARNER is a 73 year old man status post robotic left lung resection (Dr Hoffmann, May 2024) who presents with complains of abdominal wall bulging just inferior to the left costal margin in the lateral abdominal wall. He reports discomfort at this site. No incarceration. Denies fever/chills/nausea/emesis or changes in bowel or bladder habits. Tolerating diet. Normal bowel movements.   He reports numbness in the left upper abdominal wall, with recent return of sensation in the area.    XXXX smoking

## 2024-07-25 NOTE — CONSULT LETTER
[Dear  ___] : Dear  [unfilled], [Consult Letter:] : I had the pleasure of evaluating your patient, [unfilled]. [Please see my note below.] : Please see my note below. [Consult Closing:] : Thank you very much for allowing me to participate in the care of this patient.  If you have any questions, please do not hesitate to contact me. [Sincerely,] : Sincerely, [FreeTextEntry3] : Demetris Campos MD, FACS Director of Bariatric Surgery [facility]  Assistant Professor of Surgery  [employer] [facility]

## 2024-07-25 NOTE — PHYSICAL EXAM
[JVD] : no jugular venous distention  [No Rash or Lesion] : No rash or lesion [Alert] : alert [Oriented to Person] : oriented to person [Oriented to Place] : oriented to place [Oriented to Time] : oriented to time [Calm] : calm [de-identified] : No acute distress [de-identified] : No respiratory distress [de-identified] : Regular rate [de-identified] : soft, nontender. no rebound or guarding. asymmetry / prominence of left upper abdominal wall without appreciable hernia defect on exam.  [de-identified] : normal range of motion

## 2024-07-26 PROCEDURE — G6002: CPT | Mod: 26

## 2024-07-29 PROCEDURE — 77014: CPT | Mod: 26

## 2024-07-30 PROCEDURE — G6002: CPT | Mod: 26

## 2024-07-31 PROCEDURE — G6002: CPT | Mod: 26

## 2024-08-01 PROCEDURE — G6002: CPT | Mod: 26

## 2024-08-05 PROCEDURE — 77427 RADIATION TX MANAGEMENT X5: CPT

## 2024-08-05 PROCEDURE — G6002: CPT | Mod: 26

## 2024-08-06 ENCOUNTER — NON-APPOINTMENT (OUTPATIENT)
Age: 74
End: 2024-08-06

## 2024-08-06 PROCEDURE — G6002: CPT | Mod: 26

## 2024-08-06 NOTE — DISEASE MANAGEMENT
[Pathological] : TNM Stage: p [TTNM] : 1c [NTNM] : 2 [MTNM] : 0 [IIIA] : IIIA [de-identified] : 2400 cGy [de-identified] : 5400 cGy [de-identified] : lung left

## 2024-08-07 PROCEDURE — G6002: CPT | Mod: 26

## 2024-08-08 PROCEDURE — G6002: CPT | Mod: 26

## 2024-08-08 PROCEDURE — G6017: CPT

## 2024-08-09 PROCEDURE — G6002: CPT | Mod: 26

## 2024-08-10 ENCOUNTER — APPOINTMENT (OUTPATIENT)
Dept: CT IMAGING | Facility: CLINIC | Age: 74
End: 2024-08-10

## 2024-08-10 ENCOUNTER — OUTPATIENT (OUTPATIENT)
Dept: OUTPATIENT SERVICES | Facility: HOSPITAL | Age: 74
LOS: 1 days | End: 2024-08-10

## 2024-08-10 DIAGNOSIS — Z98.890 OTHER SPECIFIED POSTPROCEDURAL STATES: Chronic | ICD-10-CM

## 2024-08-10 DIAGNOSIS — R19.00 INTRA-ABDOMINAL AND PELVIC SWELLING, MASS AND LUMP, UNSPECIFIED SITE: ICD-10-CM

## 2024-08-10 PROCEDURE — 74176 CT ABD & PELVIS W/O CONTRAST: CPT | Mod: 26

## 2024-08-12 ENCOUNTER — NON-APPOINTMENT (OUTPATIENT)
Age: 74
End: 2024-08-12

## 2024-08-12 VITALS
DIASTOLIC BLOOD PRESSURE: 79 MMHG | OXYGEN SATURATION: 97 % | HEART RATE: 85 BPM | SYSTOLIC BLOOD PRESSURE: 129 MMHG | RESPIRATION RATE: 16 BRPM

## 2024-08-12 PROCEDURE — 77427 RADIATION TX MANAGEMENT X5: CPT

## 2024-08-12 PROCEDURE — 77014: CPT | Mod: 26

## 2024-08-12 NOTE — REVIEW OF SYSTEMS
[Fatigue: Grade 0] : Fatigue: Grade 0 [Cough: Grade 0] : Cough: Grade 0 [Dyspnea: Grade 1 - Shortness of breath with moderate exertion] : Dyspnea: Grade 1 - Shortness of breath with moderate exertion [Hoarseness: Grade 0] : Hoarseness: Grade 0

## 2024-08-12 NOTE — DISEASE MANAGEMENT
[Pathological] : TNM Stage: p [TTNM] : 1c [NTNM] : 2 [MTNM] : 0 [IIIA] : IIIA [de-identified] : 3439 [de-identified] : 7852

## 2024-08-13 PROCEDURE — G6002: CPT | Mod: 26

## 2024-08-14 ENCOUNTER — EMERGENCY (EMERGENCY)
Facility: HOSPITAL | Age: 74
LOS: 1 days | Discharge: HOME | End: 2024-08-14
Attending: EMERGENCY MEDICINE
Payer: MEDICARE

## 2024-08-14 VITALS
DIASTOLIC BLOOD PRESSURE: 61 MMHG | HEART RATE: 102 BPM | SYSTOLIC BLOOD PRESSURE: 89 MMHG | WEIGHT: 193.79 LBS | RESPIRATION RATE: 18 BRPM | OXYGEN SATURATION: 96 % | TEMPERATURE: 99 F

## 2024-08-14 DIAGNOSIS — Z98.890 OTHER SPECIFIED POSTPROCEDURAL STATES: Chronic | ICD-10-CM

## 2024-08-14 LAB
ALBUMIN SERPL ELPH-MCNC: 4.3 G/DL — SIGNIFICANT CHANGE UP (ref 3.3–5.2)
ALP SERPL-CCNC: 76 U/L — SIGNIFICANT CHANGE UP (ref 40–120)
ALT FLD-CCNC: 25 U/L — SIGNIFICANT CHANGE UP
ANION GAP SERPL CALC-SCNC: 15 MMOL/L — SIGNIFICANT CHANGE UP (ref 5–17)
APTT BLD: 33.1 SEC — SIGNIFICANT CHANGE UP (ref 24.5–35.6)
AST SERPL-CCNC: 19 U/L — SIGNIFICANT CHANGE UP
BASOPHILS # BLD AUTO: 0.16 K/UL — SIGNIFICANT CHANGE UP (ref 0–0.2)
BASOPHILS NFR BLD AUTO: 1.8 % — SIGNIFICANT CHANGE UP (ref 0–2)
BILIRUB SERPL-MCNC: 1.1 MG/DL — SIGNIFICANT CHANGE UP (ref 0.4–2)
BUN SERPL-MCNC: 24.3 MG/DL — HIGH (ref 8–20)
CALCIUM SERPL-MCNC: 10.5 MG/DL — SIGNIFICANT CHANGE UP (ref 8.4–10.5)
CHLORIDE SERPL-SCNC: 100 MMOL/L — SIGNIFICANT CHANGE UP (ref 96–108)
CO2 SERPL-SCNC: 24 MMOL/L — SIGNIFICANT CHANGE UP (ref 22–29)
CREAT SERPL-MCNC: 1.67 MG/DL — HIGH (ref 0.5–1.3)
EGFR: 43 ML/MIN/1.73M2 — LOW
EOSINOPHIL # BLD AUTO: 0.15 K/UL — SIGNIFICANT CHANGE UP (ref 0–0.5)
EOSINOPHIL NFR BLD AUTO: 1.7 % — SIGNIFICANT CHANGE UP (ref 0–6)
GLUCOSE SERPL-MCNC: 141 MG/DL — HIGH (ref 70–99)
HCT VFR BLD CALC: 42.3 % — SIGNIFICANT CHANGE UP (ref 39–50)
HGB BLD-MCNC: 14.2 G/DL — SIGNIFICANT CHANGE UP (ref 13–17)
INR BLD: 1.07 RATIO — SIGNIFICANT CHANGE UP (ref 0.85–1.18)
LACTATE BLDV-MCNC: 1.1 MMOL/L — SIGNIFICANT CHANGE UP (ref 0.5–2)
LYMPHOCYTES # BLD AUTO: 0.55 K/UL — LOW (ref 1–3.3)
LYMPHOCYTES # BLD AUTO: 6.2 % — LOW (ref 13–44)
MANUAL SMEAR VERIFICATION: SIGNIFICANT CHANGE UP
MCHC RBC-ENTMCNC: 29.8 PG — SIGNIFICANT CHANGE UP (ref 27–34)
MCHC RBC-ENTMCNC: 33.6 GM/DL — SIGNIFICANT CHANGE UP (ref 32–36)
MCV RBC AUTO: 88.7 FL — SIGNIFICANT CHANGE UP (ref 80–100)
MONOCYTES # BLD AUTO: 0.16 K/UL — SIGNIFICANT CHANGE UP (ref 0–0.9)
MONOCYTES NFR BLD AUTO: 1.8 % — LOW (ref 2–14)
NEUTROPHILS # BLD AUTO: 7.68 K/UL — HIGH (ref 1.8–7.4)
NEUTROPHILS NFR BLD AUTO: 86.7 % — HIGH (ref 43–77)
PLAT MORPH BLD: NORMAL — SIGNIFICANT CHANGE UP
PLATELET # BLD AUTO: 146 K/UL — LOW (ref 150–400)
POTASSIUM SERPL-MCNC: 4.3 MMOL/L — SIGNIFICANT CHANGE UP (ref 3.5–5.3)
POTASSIUM SERPL-SCNC: 4.3 MMOL/L — SIGNIFICANT CHANGE UP (ref 3.5–5.3)
PROT SERPL-MCNC: 7.4 G/DL — SIGNIFICANT CHANGE UP (ref 6.6–8.7)
PROTHROM AB SERPL-ACNC: 11.8 SEC — SIGNIFICANT CHANGE UP (ref 9.5–13)
RAPID RVP RESULT: SIGNIFICANT CHANGE UP
RBC # BLD: 4.77 M/UL — SIGNIFICANT CHANGE UP (ref 4.2–5.8)
RBC # FLD: 13.1 % — SIGNIFICANT CHANGE UP (ref 10.3–14.5)
RBC BLD AUTO: NORMAL — SIGNIFICANT CHANGE UP
SARS-COV-2 RNA SPEC QL NAA+PROBE: SIGNIFICANT CHANGE UP
SMUDGE CELLS # BLD: PRESENT — SIGNIFICANT CHANGE UP
SODIUM SERPL-SCNC: 138 MMOL/L — SIGNIFICANT CHANGE UP (ref 135–145)
TROPONIN T, HIGH SENSITIVITY RESULT: 13 NG/L — SIGNIFICANT CHANGE UP (ref 0–51)
VARIANT LYMPHS # BLD: 1.8 % — SIGNIFICANT CHANGE UP (ref 0–6)
WBC # BLD: 8.86 K/UL — SIGNIFICANT CHANGE UP (ref 3.8–10.5)
WBC # FLD AUTO: 8.86 K/UL — SIGNIFICANT CHANGE UP (ref 3.8–10.5)

## 2024-08-14 PROCEDURE — G6002: CPT | Mod: 26

## 2024-08-14 PROCEDURE — 71045 X-RAY EXAM CHEST 1 VIEW: CPT | Mod: 26

## 2024-08-14 PROCEDURE — 93010 ELECTROCARDIOGRAM REPORT: CPT

## 2024-08-14 PROCEDURE — 99285 EMERGENCY DEPT VISIT HI MDM: CPT

## 2024-08-14 RX ORDER — PIPERACILLIN SODIUM, TAZOBACTAM SODIUM 3; .375 G/15ML; G/15ML
3.38 INJECTION, POWDER, LYOPHILIZED, FOR SOLUTION INTRAVENOUS ONCE
Refills: 0 | Status: ACTIVE | OUTPATIENT
Start: 2024-08-15 | End: 2024-08-15

## 2024-08-14 RX ORDER — DEXTROSE MONOHYDRATE, SODIUM CHLORIDE, SODIUM LACTATE, CALCIUM CHLORIDE, MAGNESIUM CHLORIDE 1.5; 538; 448; 18.4; 5.08 G/100ML; MG/100ML; MG/100ML; MG/100ML; MG/100ML
2700 SOLUTION INTRAPERITONEAL ONCE
Refills: 0 | Status: COMPLETED | OUTPATIENT
Start: 2024-08-14 | End: 2024-08-14

## 2024-08-14 RX ORDER — ACETAMINOPHEN 500 MG
975 TABLET ORAL ONCE
Refills: 0 | Status: COMPLETED | OUTPATIENT
Start: 2024-08-14 | End: 2024-08-14

## 2024-08-14 RX ORDER — VANCOMYCIN HYDROCHLORIDE 5 G/100ML
1000 INJECTION, POWDER, LYOPHILIZED, FOR SOLUTION INTRAVENOUS ONCE
Refills: 0 | Status: COMPLETED | OUTPATIENT
Start: 2024-08-14 | End: 2024-08-14

## 2024-08-14 RX ORDER — PIPERACILLIN SODIUM, TAZOBACTAM SODIUM 3; .375 G/15ML; G/15ML
3.38 INJECTION, POWDER, LYOPHILIZED, FOR SOLUTION INTRAVENOUS ONCE
Refills: 0 | Status: COMPLETED | OUTPATIENT
Start: 2024-08-14 | End: 2024-08-14

## 2024-08-14 RX ADMIN — PIPERACILLIN SODIUM, TAZOBACTAM SODIUM 200 GRAM(S): 3; .375 INJECTION, POWDER, LYOPHILIZED, FOR SOLUTION INTRAVENOUS at 22:39

## 2024-08-14 RX ADMIN — Medication 975 MILLIGRAM(S): at 20:18

## 2024-08-14 RX ADMIN — DEXTROSE MONOHYDRATE, SODIUM CHLORIDE, SODIUM LACTATE, CALCIUM CHLORIDE, MAGNESIUM CHLORIDE 2700 MILLILITER(S): 1.5; 538; 448; 18.4; 5.08 SOLUTION INTRAPERITONEAL at 20:13

## 2024-08-14 RX ADMIN — VANCOMYCIN HYDROCHLORIDE 250 MILLIGRAM(S): 5 INJECTION, POWDER, LYOPHILIZED, FOR SOLUTION INTRAVENOUS at 20:18

## 2024-08-14 NOTE — ED ADULT NURSE NOTE - NS ED NOTE ABUSE RESPONSE YN
[FreeTextEntry1] : Benign breast and pelvic exam. Pap done today. Stool for occult blood was negative.\par \par Prescription for mammogram screening and breast US given.\par \par Self-breast exam reviewed.\par \par The benefits of screening colonoscopy were reviewed in detail. Referred to colorectal specialist\par for baseline colonoscopy.\par \par She will follow up annually and as needed.\par 
Yes

## 2024-08-14 NOTE — ED ADULT TRIAGE NOTE - CHIEF COMPLAINT QUOTE
pt c/o chest pressure and fever/chills since last night, pt took x2 tylenol @ approx. 12pm, pt was seen @  [facility] and was told to come in, hx of lung cancer currently receiving radiation, PMH HTN/ stage 3 kidney disease/lung resection , pt also endorses taking extra losartan earlier today, pt hypotensive in triage, - chest pain/SOB/

## 2024-08-14 NOTE — ED ADULT NURSE NOTE - NSFALLASSESSNEED_ED_ALL_ED
How Severe Is Your Psoriasis?: moderate
Is This A New Presentation, Or A Follow-Up?: Follow Up Psoriasis
no

## 2024-08-14 NOTE — ED ADULT NURSE NOTE - OBJECTIVE STATEMENT
Pt presents to ED hypotensive in triage pt bright to CC. Pt states he was hypertensive at home and took extra home BP meds. Pt reports dull chest pressure earlier today. Denies CP, N/V/D at this time. Pt /90 at this time. Pt connected to cardiac monitor-NSR on room air SpO2 95%.  PM Hx Lung CA, HTN. Pt currently on radiation last treatment today.

## 2024-08-15 ENCOUNTER — NON-APPOINTMENT (OUTPATIENT)
Age: 74
End: 2024-08-15

## 2024-08-15 VITALS
RESPIRATION RATE: 16 BRPM | OXYGEN SATURATION: 97 % | HEART RATE: 82 BPM | DIASTOLIC BLOOD PRESSURE: 72 MMHG | SYSTOLIC BLOOD PRESSURE: 117 MMHG | BODY MASS INDEX: 29.33 KG/M2 | HEIGHT: 69 IN | WEIGHT: 198 LBS

## 2024-08-15 VITALS
RESPIRATION RATE: 18 BRPM | SYSTOLIC BLOOD PRESSURE: 124 MMHG | HEART RATE: 67 BPM | TEMPERATURE: 98 F | DIASTOLIC BLOOD PRESSURE: 70 MMHG | OXYGEN SATURATION: 97 %

## 2024-08-15 PROCEDURE — 93005 ELECTROCARDIOGRAM TRACING: CPT

## 2024-08-15 PROCEDURE — 80053 COMPREHEN METABOLIC PANEL: CPT

## 2024-08-15 PROCEDURE — 83605 ASSAY OF LACTIC ACID: CPT

## 2024-08-15 PROCEDURE — 85610 PROTHROMBIN TIME: CPT

## 2024-08-15 PROCEDURE — 36415 COLL VENOUS BLD VENIPUNCTURE: CPT

## 2024-08-15 PROCEDURE — 96375 TX/PRO/DX INJ NEW DRUG ADDON: CPT | Mod: XU

## 2024-08-15 PROCEDURE — 0225U NFCT DS DNA&RNA 21 SARSCOV2: CPT

## 2024-08-15 PROCEDURE — 85730 THROMBOPLASTIN TIME PARTIAL: CPT

## 2024-08-15 PROCEDURE — G6002: CPT | Mod: 26

## 2024-08-15 PROCEDURE — 96374 THER/PROPH/DIAG INJ IV PUSH: CPT | Mod: XU

## 2024-08-15 PROCEDURE — 85025 COMPLETE CBC W/AUTO DIFF WBC: CPT

## 2024-08-15 PROCEDURE — 71275 CT ANGIOGRAPHY CHEST: CPT | Mod: MC

## 2024-08-15 PROCEDURE — 84484 ASSAY OF TROPONIN QUANT: CPT

## 2024-08-15 PROCEDURE — 71045 X-RAY EXAM CHEST 1 VIEW: CPT

## 2024-08-15 PROCEDURE — 71275 CT ANGIOGRAPHY CHEST: CPT | Mod: 26,MC

## 2024-08-15 PROCEDURE — 87040 BLOOD CULTURE FOR BACTERIA: CPT

## 2024-08-15 PROCEDURE — 99284 EMERGENCY DEPT VISIT MOD MDM: CPT | Mod: 25

## 2024-08-15 NOTE — ED PROVIDER NOTE - OBJECTIVE STATEMENT
74-year-old male past medical history of CKD, hypertension, hyperlipidemia, lung cancer  status post resection and currently undergoing radiation therapy presents with  chest pain.  Patient reports 1 day of a dull chest pain.  He reports associated subjective fever and chills.  The pain is constant, does not radiate, not related to exertion or deep inspiration.  He denies abdominal pain, vomiting, shortness of breath.

## 2024-08-15 NOTE — ED PROVIDER NOTE - PATIENT PORTAL LINK FT
You can access the FollowMyHealth Patient Portal offered by [facility] by registering at the following website: [email or web address]. By joining [facility]’s FollowMyHealth portal, you will also be able to view your health information using other applications (apps) compatible with our system.

## 2024-08-15 NOTE — ED PROVIDER NOTE - CLINICAL SUMMARY MEDICAL DECISION MAKING FREE TEXT BOX
18-Feb-2023 22:06 EKG without signs of ischemia, troponin is negative, pain is very typical for cardiac etiology especially in the setting of the subjective fever and chills.  No obvious pneumonia on the imaging, nor PE.  Likely a viral illness and patient is stable for discharge

## 2024-08-15 NOTE — ED ADULT NURSE REASSESSMENT NOTE - NS ED NURSE REASSESS COMMENT FT1
APC on site: DOMONIQUE Alvarado    Dose instructions as follows: See anticoagulation track for dosing details. Next INR to be completed Friday 2/4/2022- home draw order placed in EPIC    Relayed dose instructions and next INR date to patient verbally over the phone, patient verbalized understanding and had no further questions.       Assumed care of patient at 2330. Pt is A&O x 4 with family at bedside, Denies any current chest pain, SOB, n/v/d. Breathing is spontaneous even and unlabored. Bed is in lowest position and side rails up. Safety precautions maintained.

## 2024-08-15 NOTE — ED PROVIDER NOTE - CROS ED SKIN ALL NEG
[de-identified] :   
negative...

## 2024-08-15 NOTE — ED PROVIDER NOTE - CPE EDP [facility] NORM
AVS form given to Pt .  Letter for work given to Pt.  Saline lock discontinued.  Pt's dad here to  Pt.  Rn communicated with Pt via writing.  Pt verbalized in writing that she understood all instructions.  Pt was discharged down to the lobby via wheelchair accompanied by transport service.   normal...

## 2024-08-16 PROCEDURE — G6002: CPT | Mod: 26

## 2024-08-18 ENCOUNTER — OUTPATIENT (OUTPATIENT)
Dept: OUTPATIENT SERVICES | Facility: HOSPITAL | Age: 74
LOS: 1 days | Discharge: HOME | End: 2024-08-18

## 2024-08-18 DIAGNOSIS — Z98.890 OTHER SPECIFIED POSTPROCEDURAL STATES: Chronic | ICD-10-CM

## 2024-08-18 DIAGNOSIS — D64.9 ANEMIA, UNSPECIFIED: ICD-10-CM

## 2024-08-19 ENCOUNTER — NON-APPOINTMENT (OUTPATIENT)
Age: 74
End: 2024-08-19

## 2024-08-19 VITALS
DIASTOLIC BLOOD PRESSURE: 76 MMHG | OXYGEN SATURATION: 99 % | HEART RATE: 76 BPM | RESPIRATION RATE: 16 BRPM | SYSTOLIC BLOOD PRESSURE: 114 MMHG | WEIGHT: 198 LBS | BODY MASS INDEX: 29.24 KG/M2

## 2024-08-19 PROCEDURE — 77427 RADIATION TX MANAGEMENT X5: CPT

## 2024-08-19 PROCEDURE — ZZZZZ: CPT

## 2024-08-19 PROCEDURE — G6002: CPT | Mod: 26

## 2024-08-19 PROCEDURE — G6017: CPT

## 2024-08-19 RX ORDER — FOLIC ACID 1 MG/1
1 TABLET ORAL
Qty: 30 | Refills: 2 | Status: ACTIVE | COMMUNITY
Start: 2024-08-19 | End: 1900-01-01

## 2024-08-19 RX ORDER — DEXAMETHASONE 4 MG/1
4 TABLET ORAL
Qty: 30 | Refills: 2 | Status: ACTIVE | COMMUNITY
Start: 2024-08-19 | End: 1900-01-01

## 2024-08-19 RX ORDER — PROCHLORPERAZINE MALEATE 10 MG/1
10 TABLET ORAL
Qty: 120 | Refills: 2 | Status: ACTIVE | COMMUNITY
Start: 2024-08-19 | End: 1900-01-01

## 2024-08-19 RX ORDER — ONDANSETRON 8 MG/1
8 TABLET ORAL
Qty: 90 | Refills: 2 | Status: ACTIVE | COMMUNITY
Start: 2024-08-19 | End: 1900-01-01

## 2024-08-19 NOTE — DISEASE MANAGEMENT
[Pathological] : TNM Stage: p [IIIA] : IIIA [TTNM] : 1c [NTNM] : 2 [MTNM] : 0 [de-identified] : 6014 [de-identified] : 5949 [de-identified] : Lung l

## 2024-08-19 NOTE — VITALS
[Maximal Pain Intensity: 1/10] : 1/10 [Least Pain Intensity: 1/10] : 1/10 [80: Normal activity with effort; some signs or symptoms of disease.] : 80: Normal activity with effort; some signs or symptoms of disease.  [ECOG Performance Status: 1 - Restricted in physically strenuous activity but ambulatory and able to carry out work of a light or sedentary nature] : Performance Status: 1 - Restricted in physically strenuous activity but ambulatory and able to carry out work of a light or sedentary nature, e.g., light house work, office work [0 - No Distress] : Distress Level: 0

## 2024-08-20 LAB
CULTURE RESULTS: SIGNIFICANT CHANGE UP
SPECIMEN SOURCE: SIGNIFICANT CHANGE UP

## 2024-08-20 PROCEDURE — G6002: CPT | Mod: 26

## 2024-08-21 PROCEDURE — G6002: CPT | Mod: 26

## 2024-08-22 PROCEDURE — G6002: CPT | Mod: 26

## 2024-08-23 PROCEDURE — G6002: CPT | Mod: 26

## 2024-08-27 ENCOUNTER — NON-APPOINTMENT (OUTPATIENT)
Age: 74
End: 2024-08-27

## 2024-08-27 VITALS
HEART RATE: 88 BPM | RESPIRATION RATE: 16 BRPM | WEIGHT: 198 LBS | DIASTOLIC BLOOD PRESSURE: 78 MMHG | BODY MASS INDEX: 29.33 KG/M2 | HEIGHT: 69 IN | OXYGEN SATURATION: 98 % | SYSTOLIC BLOOD PRESSURE: 127 MMHG

## 2024-08-27 PROCEDURE — G6002: CPT | Mod: 26

## 2024-08-28 PROCEDURE — G6002: CPT | Mod: 26

## 2024-08-30 ENCOUNTER — APPOINTMENT (OUTPATIENT)
Dept: HEMATOLOGY ONCOLOGY | Facility: CLINIC | Age: 74
End: 2024-08-30
Payer: MEDICARE

## 2024-08-30 VITALS
HEIGHT: 69 IN | BODY MASS INDEX: 28.88 KG/M2 | RESPIRATION RATE: 16 BRPM | SYSTOLIC BLOOD PRESSURE: 131 MMHG | OXYGEN SATURATION: 97 % | HEART RATE: 83 BPM | DIASTOLIC BLOOD PRESSURE: 83 MMHG | TEMPERATURE: 97.2 F | WEIGHT: 195 LBS

## 2024-08-30 DIAGNOSIS — C34.90 MALIGNANT NEOPLASM OF UNSPECIFIED PART OF UNSPECIFIED BRONCHUS OR LUNG: ICD-10-CM

## 2024-08-30 DIAGNOSIS — R59.0 LOCALIZED ENLARGED LYMPH NODES: ICD-10-CM

## 2024-08-30 PROCEDURE — 99215 OFFICE O/P EST HI 40 MIN: CPT

## 2024-08-30 NOTE — HISTORY OF PRESENT ILLNESS
[de-identified] : KAYLEE WARNER is a 73 year M with PMHX of depression, CKD, HLD, HTN, former smoker ( smoked age [age]) presents for initial consultation for Adenocarcinoma of the lung  Patient has CT chest performed on 03/07/2024: (ZP) revealed a lobulated spiculated 2.2 cm lesion in the left upper lobe abutting the major fissure and a 6 mm right upper lobe nodule  Subsequent PET/CT on 03/18/2024 reported an intense FDG activity corresponding with a left upper lobe mass (SUV 12.1, 2.0 x 2.0 cm). This corresponds with the nodule described in this region on the recent CT chest of 3/7/2024.  There are intensely FDG avid mediastinal and bilateral hilar lymph nodes. For example a left subcarinal node (SUV 8.0, 1.7 x 1.0 cm); a left hilar node (SUV 6.4); and in the right hilar region (SUV 3.7). Metastatic lymphadenopathy is suspected.  Patient underwent Endobronchial ultrasound biopsy on 04/09/2024, Pathology was non diagnostic.  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 for left PTX on 6/4/24  Cardiologist; Dr. Moore Nephrologist: Dr. Mandujano  [de-identified] : Pt presents for follow up visit with wife Pt reports recovering better Reports constant pain in abdominal side He reports surgical pains are getting better Pt reports improved sleep due to less pain Has been given gabapentin for pain, taking one a day due to kidney disease, also given oxycodone as well but has not taken yet He reports his blood pressure is irregular, take blood pressure himself, on Losartan At home, he has around 120/70   8/30/24: PT presents for follow up - Finished radiation this Wednesday. PT states tolerating it well with very little pain. - Due to start treatment on the 9/05/24 (Thursday) - PT states having chills and a fever. He went to the hospital where he had a CT Scan which showed some interval worsening of  lymphadenopathy in the center of the chest. - The patient reports improvement in pain but still experiences numbness and a sensation of a "basketball" in the affected area when  leaning over or reaching. - Concern that the nerve controlling the muscles might have been cut, leading to the bulge.

## 2024-08-30 NOTE — ASSESSMENT
[Future Reassessment of Pain Scale] : Future reassessment of pain scale    [Medication(s)] : Medication(s) [With Patient/Caregiver] : With Patient/Caregiver [FreeTextEntry1] : 73 year M with PMHX of depression, CKD, HLD, HTN, former smoker (smoked age [age], 0.5 ppd) presents for initial consultation for Adenocarcinoma of the lung  Patient has CT chest performed on 2024: (ZP) revealed a lobulated spiculated 2.2 cm lesion in the left upper lobe abutting the major fissure and a 6 mm right upper lobe nodule  Subsequent PET/CT on 2024 reported an intense FDG activity corresponding with a left upper lobe mass (SUV 12.1, 2.0 x 2.0 cm). +  FDG avid mediastinal and bilateral hilar lymph nodes. Left subcarinal node (SUV 8.0, 1.7 x 1.0 cm); a left hilar node (SUV 6.4); and in the right hilar region (SUV 3.7).   Patient underwent Endobronchial ultrasound biopsy on 2024, Pathology was non diagnostic.  S/p Flex Bronch Robotic assisted left upper lobe lingulectomy on 24 # p T1cN2 adenocarcinoma left lung PDL1 1-2 %  LN +ve - extensive lymphovascular invasion present, visceral pleura invasion not identified, resection margin negative for tumor but is 1 mm from the margin after removal of the staple line,  - Referred to [facility] negative 1mm stapled margin, per Dr. Ferreira, planning for 30 days of IMRT, simulation on 24 -If there is delay in starting RT, can consider starting Tagrisso earlier -Plan to start chemotherapy after completion of RT - [facility] on 24: EGFR - L858R amp, MS-Stable, TMB 1 Muts/Mb, PTEN T319fs*1, RICTOR amp, [id number], TP - MR Head on 24 - Unremarkable MR of the brain.  No abnormal enhancement to suggest metastases. - Discussed treatment with CarboAUC 5 / Pemetrexed 500mg/m2 q 3 weeks x 4 cycles followed by Tagrisso 80 mg daily x 3 years per ADAURA trial Discussed side effects of carboplatin including but not limited to Nausea/vomiting/myelosuppression/fatigue. Pemetrexed has side effects including N/v/myelosuppression/diarrhea/desquamation skin rash/Gave patient instructions for febrile neutropenia and to call the office if they were to develop a fever. Side effects of Tagrisso include Anemai/ Myelosupression. Diarrhea LFts abnormalitiws discussed -Chemo Consent signed - Folate 1mg daily while on pemetrexed -Dexamethasone 4mg bid day before day of and day after chemo (prescription sent to pharmacy) -vitamin B12 1,000 mcg IM 7 days Given today and then every 3 cycles while on therapy  Plan:  - Due to start treatment C1 of Pemetrexed on the 24 (Thursday) - Treatment will include four cycles of chemotherapy followed by Tagrisso, with flexibility to stop chemotherapy early if not tolerated well. - scan in End 2024  F/u with Sahra 3/4 weeks after Chemo.         [AdvancecareDate] : 7/3/24

## 2024-08-30 NOTE — ADDENDUM
[FreeTextEntry1] :  Documented by Garrett Shukla acting as a scribe for  on 08/30/2024  All Medical record entries made by the Scribe were at my, Dr. Moore's, direction and personally dictated by me on  08/30/2024. I have reviewed the chart and agree that the record accurately reflects my personal performance of the history, physical exam, assessment and plan. I have also personally directed, reviewed, and agreed with the discharge instructions.
caffeine

## 2024-09-03 NOTE — H&P PST ADULT - OPHTHALMOLOGIC COMMENTS
"Orthopedic Surgery Progress Note:     Thoracic 10 to Lumbar 4 Instrumented Posterior Interbody Fusion and Nicole Woodson Osteotomy with O-Arm/Stealth Navigation, use of allograft, local autograft, and bone morphogenic protein      8/30/24    Subjective:   Patient had a better night but not much better than night before.  Tolerating diet  Doing well able to mobilize.    Objective:   /72 (BP Location: Left arm, Patient Position: Semi-Almeida's, Cuff Size: Adult Regular)   Pulse 105   Temp 98.4  F (36.9  C) (Oral)   Resp 16   Ht 1.651 m (5' 5\")   Wt 61.6 kg (135 lb 12.9 oz)   SpO2 100%   BMI 22.60 kg/m    No intake/output data recorded.  General: NAD. Resting comfortably in bed.  Respiratory: Breathing comfortably on RA.  Drain Output:   Superficial: 25mL  Musculoskeletal: Clean and dry bandages        Motor Strength Right Left   Hip flexion: L1, L2, L3 5/5 5/5   Hip adduction: L2, L3 5/5 5/5   Knee flexion: S1 5/5 5/5   Knee extension: L3, L4 5/5 5/5   Ankle dosiflexion: L4, L5 5/5 5/5   EHL: L5 5/5 5/5   Ankle plantarflexion: S1 5/5 5/5      Sensation from L1-S2 is preserved.    Laboratory Data:  Lab Results   Component Value Date    WBC 10.2 08/31/2024    HGB 9.6 (L) 09/01/2024     08/31/2024       Images:  No new images were obtained.Postop images ordered     Assessment & Plan:   Khoa Cedeño is a 43 year old female with PMH including PTSD MDD now s/p above procedure on 8/30/2024 with Dr. Hernandez. Procedure went well without complications.      Upon evaluation today patient found resting comfortably. She refers that she experiences pain on her back that does not radiate towards legs. Pain service on board with case and recommendations on note. She was reminded that Oxy doses are PRN and that she must notify nursing for medication to be brought. It is my impression that she may have forgotten this detail leading to periods of uncontrolled pain levels at times, unfortunately.     Trial of Voiding " will be done today for removal of jacosb catheter, since last attempt led to urine retention.   Pending Xrays.      Will monitor pain levels  Will remove remaining drain today.   Pending postoperative X-rays when feasible.   TOV later today     Dr. Hernandez Primary  Activity: Up with assist until independent No excessive bending or twisting. No lifting >10 lbs x 6 weeks. No Alejandro lift for transfers.   Weight bearing status: WBAT.  Pain management:   IV lidocaine: discontinue at 8am on POD#2 or earlier if complications arise.  Transition from IV to PO narcotics as tolerated. No NSAIDs   Antibiotics: Antibtioics until drains are removed  Diet: Begin with clear fluids and progress diet as tolerated.   DVT prophylaxis: SCDs only. No chemical DVT ppx needed.  Imaging: XR Upright Thoracolumbar 2 views PTDC - ordered.  Labs: Hgb POD 1  Bracing/Splinting: None.  Dressings: Keep Bandages clean and dry.   Drains: x2 Document output per shift, will be discontinued at Orthopedic Surgery discretion.  Jacobs catheter: Remove POD#1.   Physical Therapy/Occupational Therapy: Eval and treat  Cultures: none.    Consults: Hospitalist  Follow-up: Clinic with Dr. Hernandez in 6 weeks with repeat x-rays.   Disposition: Pending progress with therapies, pain control on orals, and medical stability, anticipate discharge to home on POD #3-5.    Justyn Valladares MD  Spine Fellow     PLEASE PAGE ME directly with any questions/concerns during regular weekday hours before 5 pm. If there is no response, if it is a weekend, or if it is during evening hours then please page the orthopedic surgery resident on call.    FOLLOWUP:    Future Appointments   Date Time Provider Department Center   9/3/2024  8:45 AM Jennifer Cooney, PT [facility] [place]   9/3/2024 10:00 AM Alena Sales, STEFANR [facility] [place]   9/3/2024  1:30 PM Jennifer Cooney, PT [facility] [place]   10/15/2024 11:40 AM [facility]el [facility] MD [facility]       Addendum completed by BIJU Velasquez  9/3 at 10:50am: after discussion with Dr. Collins of pain team, scheduled tylenol. Will consider increasing frequency as needed, but suspect will maintain dosing at this time. Monet removed and order placed for drain to be removed.    using reading glasses

## 2024-09-05 ENCOUNTER — RESULT REVIEW (OUTPATIENT)
Age: 74
End: 2024-09-05

## 2024-09-05 ENCOUNTER — APPOINTMENT (OUTPATIENT)
Age: 74
End: 2024-09-05

## 2024-09-05 ENCOUNTER — NON-APPOINTMENT (OUTPATIENT)
Age: 74
End: 2024-09-05

## 2024-09-05 LAB
ALBUMIN SERPL ELPH-MCNC: 4.4 G/DL — SIGNIFICANT CHANGE UP (ref 3.3–5)
ALP SERPL-CCNC: 84 U/L — SIGNIFICANT CHANGE UP (ref 40–120)
ALT FLD-CCNC: 35 U/L — SIGNIFICANT CHANGE UP (ref 10–45)
ANION GAP SERPL CALC-SCNC: 14 MMOL/L — SIGNIFICANT CHANGE UP (ref 5–17)
AST SERPL-CCNC: 26 U/L — SIGNIFICANT CHANGE UP (ref 10–40)
BASOPHILS # BLD AUTO: 0 K/UL — SIGNIFICANT CHANGE UP (ref 0–0.2)
BASOPHILS NFR BLD AUTO: 0.1 % — SIGNIFICANT CHANGE UP (ref 0–2)
BILIRUB SERPL-MCNC: 0.5 MG/DL — SIGNIFICANT CHANGE UP (ref 0.2–1.2)
BUN SERPL-MCNC: 25 MG/DL — HIGH (ref 7–23)
CALCIUM SERPL-MCNC: 10.2 MG/DL — SIGNIFICANT CHANGE UP (ref 8.4–10.5)
CHLORIDE SERPL-SCNC: 100 MMOL/L — SIGNIFICANT CHANGE UP (ref 96–108)
CO2 SERPL-SCNC: 23 MMOL/L — SIGNIFICANT CHANGE UP (ref 22–31)
CREAT SERPL-MCNC: 1.23 MG/DL — SIGNIFICANT CHANGE UP (ref 0.5–1.3)
EGFR: 62 ML/MIN/1.73M2 — SIGNIFICANT CHANGE UP
EOSINOPHIL # BLD AUTO: 0 K/UL — SIGNIFICANT CHANGE UP (ref 0–0.5)
EOSINOPHIL NFR BLD AUTO: 0.1 % — SIGNIFICANT CHANGE UP (ref 0–6)
GLUCOSE SERPL-MCNC: 191 MG/DL — HIGH (ref 70–99)
HCT VFR BLD CALC: 42 % — SIGNIFICANT CHANGE UP (ref 39–50)
HGB BLD-MCNC: 14 G/DL — SIGNIFICANT CHANGE UP (ref 13–17)
LYMPHOCYTES # BLD AUTO: 0.3 K/UL — LOW (ref 1–3.3)
LYMPHOCYTES # BLD AUTO: 2.8 % — LOW (ref 13–44)
MCHC RBC-ENTMCNC: 30.9 PG — SIGNIFICANT CHANGE UP (ref 27–34)
MCHC RBC-ENTMCNC: 33.2 G/DL — SIGNIFICANT CHANGE UP (ref 32–36)
MCV RBC AUTO: 93 FL — SIGNIFICANT CHANGE UP (ref 80–100)
MONOCYTES # BLD AUTO: 0.2 K/UL — SIGNIFICANT CHANGE UP (ref 0–0.9)
MONOCYTES NFR BLD AUTO: 1.7 % — LOW (ref 2–14)
NEUTROPHILS # BLD AUTO: 11.7 K/UL — HIGH (ref 1.8–7.4)
NEUTROPHILS NFR BLD AUTO: 95.3 % — HIGH (ref 43–77)
PLATELET # BLD AUTO: 129 K/UL — LOW (ref 150–400)
POTASSIUM SERPL-MCNC: 4.1 MMOL/L — SIGNIFICANT CHANGE UP (ref 3.5–5.3)
POTASSIUM SERPL-SCNC: 4.1 MMOL/L — SIGNIFICANT CHANGE UP (ref 3.5–5.3)
PROT SERPL-MCNC: 7.6 G/DL — SIGNIFICANT CHANGE UP (ref 6–8.3)
RBC # BLD: 4.52 M/UL — SIGNIFICANT CHANGE UP (ref 4.2–5.8)
RBC # FLD: 12.5 % — SIGNIFICANT CHANGE UP (ref 10.3–14.5)
SODIUM SERPL-SCNC: 136 MMOL/L — SIGNIFICANT CHANGE UP (ref 135–145)
WBC # BLD: 12.3 K/UL — HIGH (ref 3.8–10.5)
WBC # FLD AUTO: 12.3 K/UL — HIGH (ref 3.8–10.5)

## 2024-09-06 DIAGNOSIS — C34.90 MALIGNANT NEOPLASM OF UNSPECIFIED PART OF UNSPECIFIED BRONCHUS OR LUNG: ICD-10-CM

## 2024-09-06 DIAGNOSIS — R11.2 NAUSEA WITH VOMITING, UNSPECIFIED: ICD-10-CM

## 2024-09-06 DIAGNOSIS — Z51.11 ENCOUNTER FOR ANTINEOPLASTIC CHEMOTHERAPY: ICD-10-CM

## 2024-09-24 ENCOUNTER — NON-APPOINTMENT (OUTPATIENT)
Age: 74
End: 2024-09-24

## 2024-09-26 ENCOUNTER — RESULT REVIEW (OUTPATIENT)
Age: 74
End: 2024-09-26

## 2024-09-26 ENCOUNTER — APPOINTMENT (OUTPATIENT)
Age: 74
End: 2024-09-26

## 2024-09-26 LAB
BASOPHILS # BLD AUTO: 0 K/UL — SIGNIFICANT CHANGE UP (ref 0–0.2)
BASOPHILS NFR BLD AUTO: 0.6 % — SIGNIFICANT CHANGE UP (ref 0–2)
EOSINOPHIL # BLD AUTO: 0 K/UL — SIGNIFICANT CHANGE UP (ref 0–0.5)
EOSINOPHIL NFR BLD AUTO: 0.1 % — SIGNIFICANT CHANGE UP (ref 0–6)
HCT VFR BLD CALC: 37.3 % — LOW (ref 39–50)
HGB BLD-MCNC: 12.4 G/DL — LOW (ref 13–17)
LYMPHOCYTES # BLD AUTO: 0.4 K/UL — LOW (ref 1–3.3)
LYMPHOCYTES # BLD AUTO: 7.9 % — LOW (ref 13–44)
MCHC RBC-ENTMCNC: 30.6 PG — SIGNIFICANT CHANGE UP (ref 27–34)
MCHC RBC-ENTMCNC: 33.2 G/DL — SIGNIFICANT CHANGE UP (ref 32–36)
MCV RBC AUTO: 92.3 FL — SIGNIFICANT CHANGE UP (ref 80–100)
MONOCYTES # BLD AUTO: 0.2 K/UL — SIGNIFICANT CHANGE UP (ref 0–0.9)
MONOCYTES NFR BLD AUTO: 3.8 % — SIGNIFICANT CHANGE UP (ref 2–14)
NEUTROPHILS # BLD AUTO: 4.6 K/UL — SIGNIFICANT CHANGE UP (ref 1.8–7.4)
NEUTROPHILS NFR BLD AUTO: 87.6 % — HIGH (ref 43–77)
PLATELET # BLD AUTO: 375 K/UL — SIGNIFICANT CHANGE UP (ref 150–400)
RBC # BLD: 4.04 M/UL — LOW (ref 4.2–5.8)
RBC # FLD: 12.2 % — SIGNIFICANT CHANGE UP (ref 10.3–14.5)
WBC # BLD: 5.3 K/UL — SIGNIFICANT CHANGE UP (ref 3.8–10.5)
WBC # FLD AUTO: 5.3 K/UL — SIGNIFICANT CHANGE UP (ref 3.8–10.5)

## 2024-09-27 LAB
ALBUMIN SERPL ELPH-MCNC: 4 G/DL — SIGNIFICANT CHANGE UP (ref 3.3–5)
ALP SERPL-CCNC: 87 U/L — SIGNIFICANT CHANGE UP (ref 40–120)
ALT FLD-CCNC: 79 U/L — HIGH (ref 10–45)
ANION GAP SERPL CALC-SCNC: 16 MMOL/L — SIGNIFICANT CHANGE UP (ref 5–17)
AST SERPL-CCNC: 42 U/L — HIGH (ref 10–40)
BILIRUB SERPL-MCNC: 0.6 MG/DL — SIGNIFICANT CHANGE UP (ref 0.2–1.2)
BUN SERPL-MCNC: 21 MG/DL — SIGNIFICANT CHANGE UP (ref 7–23)
CALCIUM SERPL-MCNC: 9.7 MG/DL — SIGNIFICANT CHANGE UP (ref 8.4–10.5)
CHLORIDE SERPL-SCNC: 99 MMOL/L — SIGNIFICANT CHANGE UP (ref 96–108)
CO2 SERPL-SCNC: 21 MMOL/L — LOW (ref 22–31)
CREAT SERPL-MCNC: 0.99 MG/DL — SIGNIFICANT CHANGE UP (ref 0.5–1.3)
EGFR: 80 ML/MIN/1.73M2 — SIGNIFICANT CHANGE UP
GLUCOSE SERPL-MCNC: 312 MG/DL — HIGH (ref 70–99)
POTASSIUM SERPL-MCNC: 4.1 MMOL/L — SIGNIFICANT CHANGE UP (ref 3.5–5.3)
POTASSIUM SERPL-SCNC: 4.1 MMOL/L — SIGNIFICANT CHANGE UP (ref 3.5–5.3)
PROT SERPL-MCNC: 6.6 G/DL — SIGNIFICANT CHANGE UP (ref 6–8.3)
SODIUM SERPL-SCNC: 137 MMOL/L — SIGNIFICANT CHANGE UP (ref 135–145)

## 2024-10-02 ENCOUNTER — APPOINTMENT (OUTPATIENT)
Dept: RADIATION ONCOLOGY | Facility: CLINIC | Age: 74
End: 2024-10-02
Payer: MEDICARE

## 2024-10-02 VITALS
BODY MASS INDEX: 28.5 KG/M2 | SYSTOLIC BLOOD PRESSURE: 129 MMHG | WEIGHT: 193 LBS | RESPIRATION RATE: 15 BRPM | OXYGEN SATURATION: 97 % | DIASTOLIC BLOOD PRESSURE: 85 MMHG | HEART RATE: 83 BPM

## 2024-10-02 PROCEDURE — 99214 OFFICE O/P EST MOD 30 MIN: CPT

## 2024-10-02 NOTE — DISEASE MANAGEMENT
[Pathological] : TNM Stage: p [IIIA] : IIIA [FreeTextEntry4] : micropapillary adenocarcinoma [TTNM] : 1c [NTNM] : 2 [MTNM] : 0 [de-identified] : 5400 cGy [de-identified] : Left lung

## 2024-10-02 NOTE — HISTORY OF PRESENT ILLNESS
[FreeTextEntry1] : This 74-year-old male presents for post-treatment evaluation. Mr. Chua is s/p segmentectomy of the Left upper lung lobe/lingula on 5/21/2024 for micropapillary adenocarcinoma.  At last on-treatment visit: Denies wheezing, cough, nausea or loss of weight. He reported mild tightness in his chest and discomfort to his mid to upper back. Otherwise tolerating treatment well. Denies pain. Tolerating regular diet. In good spirits. Continue Radiation treatments as scheduled. Patient noted having to go to emergency room ([facility]) the prior week due to pains in chest and fever. Notes he had a cardiac, pulmonary, and GI work up all negative for pathology. Given antibiotic and sent home. The next day he presented for treatment and felt much better.  Will continue radiotherapy course as planned.  Today the patient reports feeling well considering he has almost completed 2 cycles of chemotherapy at the direction of Dr. Moore.  He notes the tightness in his mid-chest is subsiding.  He takes a Pepcid AC occasionally for reflux.  Reports he walks 30 miles per week without any SOB.  Breath sounds CTA B/L.

## 2024-10-03 ENCOUNTER — APPOINTMENT (OUTPATIENT)
Dept: HEMATOLOGY ONCOLOGY | Facility: CLINIC | Age: 74
End: 2024-10-03

## 2024-10-03 ENCOUNTER — APPOINTMENT (OUTPATIENT)
Dept: THORACIC SURGERY | Facility: CLINIC | Age: 74
End: 2024-10-03
Payer: MEDICARE

## 2024-10-03 VITALS
HEIGHT: 69 IN | WEIGHT: 192 LBS | OXYGEN SATURATION: 99 % | TEMPERATURE: 97.7 F | BODY MASS INDEX: 28.44 KG/M2 | SYSTOLIC BLOOD PRESSURE: 112 MMHG | DIASTOLIC BLOOD PRESSURE: 76 MMHG | RESPIRATION RATE: 16 BRPM | HEART RATE: 104 BPM

## 2024-10-03 PROCEDURE — 99213 OFFICE O/P EST LOW 20 MIN: CPT

## 2024-10-04 LAB
ALBUMIN SERPL ELPH-MCNC: 4.2 G/DL
ALP BLD-CCNC: 94 U/L
ALT SERPL-CCNC: 71 U/L
ANION GAP SERPL CALC-SCNC: 12 MMOL/L
AST SERPL-CCNC: 35 U/L
BILIRUB SERPL-MCNC: 0.6 MG/DL
BUN SERPL-MCNC: 28 MG/DL
CALCIUM SERPL-MCNC: 9.8 MG/DL
CHLORIDE SERPL-SCNC: 98 MMOL/L
CO2 SERPL-SCNC: 28 MMOL/L
CREAT SERPL-MCNC: 1.24 MG/DL
EGFR: 61 ML/MIN/1.73M2
GLUCOSE SERPL-MCNC: 122 MG/DL
POTASSIUM SERPL-SCNC: 4.8 MMOL/L
PROT SERPL-MCNC: 6.5 G/DL
SODIUM SERPL-SCNC: 137 MMOL/L

## 2024-10-04 NOTE — DATA REVIEWED
[FreeTextEntry1] : Independent review of imaging and independent interpretation was performed at today's visit: - 8/15/24 CTA chest

## 2024-10-04 NOTE — REVIEW OF SYSTEMS
[Fever] : no fever [Chills] : no chills [Feeling Poorly] : not feeling poorly [Feeling Tired] : not feeling tired [Chest Pain] : no chest pain [Palpitations] : no palpitations [Lower Ext Edema] : no extremity edema [Shortness Of Breath] : no shortness of breath [Wheezing] : no wheezing [Cough] : no cough [SOB on Exertion] : no shortness of breath during exertion [As Noted in HPI] : as noted in HPI [Negative] : Gastrointestinal

## 2024-10-04 NOTE — HISTORY OF PRESENT ILLNESS
[FreeTextEntry1] : Mr. KAYLEE WARNER is a 74-year-old male who presents for a follow up appointment. Past medical history includes HTN, HLD, Depression, CKD Stage III, BCC of face.  Originally, Mr. Warner presented with chest congestion, nasal congestion and cough, went to urgent care, found to have abnormal CXR, was given a course of ABX and steroid. Subsequent CT and PET showed AUGIE nodule and mediastinal lymphadenopathy. Referred by Thoracic Cancer Navigation for evaluation.  4/9/24 s/p Flexible bronchoscopy with bronchial lavage and then a bronchial ultrasound *Cytopathology of subcarinal, paratracheal (left and right) is non-diagnostic. *Cytopathology of BAL is negative for malignant cells. Thinprep show moderate cellularity composed of groups of uniform ciliated bronchial epithelial cells, scattered alveolar macrophages and mixed inflammatory cells.  5/21/24: s/p Flex Bronch Robotic assisted left upper lobe lingulectomy with mediastinal lymph node dissection. 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 + Level 5 and Level 11. Extensive lymphovascular invasion present Visceral pleura invasion not identified. Resection margin negative for tumor but is 1 mm from the margin after removal of the staple line. PD-L1 1-2%.  6/4/24: patient presented to ED for left PTX. s/p pigtail placement and discharged on 06/06/2024.  Patient has since established care and has been following with Dr. Meyers (Hem/Onc) and Dr. Anant Rae (Rad/Onc).  8/10/24: CT abdomen/pelvis through [facility] - Cholelithiasis. No pericholecystic inflammatory changes. - Diverticulosis without acute diverticulitis. - Mild increased density within the left lateral abdominal wall subcutaneous fat suggesting postsurgical changes. No abdominal wall or inguinal hernia.  [facility]/24 [facility]  - No pulmonary embolus. No evidence of pneumonia. - Interval resection of left upper lobe lung mass with mild scarring/atelectasis adjacent to the sutures and trace left pleural effusion. - Slight interval worsening of mediastinal lymphadenopathy.  Mr. Warner presents today for clinical evaluation; he states that he has completed radiation, and has two more rounds of chemotherapy. Mr. Warner states concern that he continues to note a bulge on his anterior left chest wall below surgical incisions. In addition, he continues to experience nerve like discomfort in his left chest wall, however, improving from his previous symptoms.

## 2024-10-04 NOTE — HISTORY OF PRESENT ILLNESS
[FreeTextEntry1] : Mr. KAYLEE WARNER is a 74-year-old male who presents for a follow up appointment. Past medical history includes HTN, HLD, Depression, CKD Stage III, BCC of face.  Originally, Mr. Warner presented with chest congestion, nasal congestion and cough, went to urgent care, found to have abnormal CXR, was given a course of ABX and steroid. Subsequent CT and PET showed AUGIE nodule and mediastinal lymphadenopathy. Referred by Thoracic Cancer Navigation for evaluation.  4/9/24 s/p Flexible bronchoscopy with bronchial lavage and then a bronchial ultrasound *Cytopathology of subcarinal, paratracheal (left and right) is non-diagnostic. *Cytopathology of BAL is negative for malignant cells. Thinprep show moderate cellularity composed of groups of uniform ciliated bronchial epithelial cells, scattered alveolar macrophages and mixed inflammatory cells.  5/21/24: s/p Flex Bronch Robotic assisted left upper lobe lingulectomy with mediastinal lymph node dissection. 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 + Level 5 and Level 11. Extensive lymphovascular invasion present Visceral pleura invasion not identified. Resection margin negative for tumor but is 1 mm from the margin after removal of the staple line. PD-L1 1-2%.  6/4/24: patient presented to ED for left PTX. s/p pigtail placement and discharged on 06/06/2024.  Patient has since established care and has been following with Dr. Meyers (Hem/Onc) and Dr. Anant Rae (Rad/Onc).  8/10/24: CT abdomen/pelvis through [facility] - Cholelithiasis. No pericholecystic inflammatory changes. - Diverticulosis without acute diverticulitis. - Mild increased density within the left lateral abdominal wall subcutaneous fat suggesting postsurgical changes. No abdominal wall or inguinal hernia.  8/15/24 [facility] chest at [facility]  - No pulmonary embolus. No evidence of pneumonia. - Interval resection of left upper lobe lung mass with mild scarring/atelectasis adjacent to the sutures and trace left pleural effusion. - Slight interval worsening of mediastinal lymphadenopathy.  Mr. Warner presents today for clinical evaluation; he states that he has completed radiation, and has two more rounds of chemotherapy. Mr. Warner states concern that he continues to note a bulge on his anterior left chest wall below surgical incisions. In addition, he continues to experience nerve like discomfort in his left chest wall, however, improving from his previous symptoms.

## 2024-10-04 NOTE — PHYSICAL EXAM
[Sclera] : the sclera and conjunctiva were normal [Neck Appearance] : the appearance of the neck was normal [] : no respiratory distress [Respiration, Rhythm And Depth] : normal respiratory rhythm and effort [Auscultation Breath Sounds / Voice Sounds] : lungs were clear to auscultation bilaterally [Heart Rate And Rhythm] : heart rate was normal and rhythm regular [Heart Sounds] : normal S1 and S2 [Examination Of The Chest] : the chest was normal in appearance [Abdomen Tenderness] : non-tender [Abnormal Walk] : normal gait [Skin Color & Pigmentation] : normal skin color and pigmentation [Sensation] : the sensory exam was normal to light touch and pinprick [Motor Exam] : the motor exam was normal [Oriented To Time, Place, And Person] : oriented to person, place, and time [Impaired Insight] : insight and judgment were intact [Affect] : the affect was normal [Mood] : the mood was normal [FreeTextEntry1] : small bulge on the anterior left chest wall below surgical incisions

## 2024-10-04 NOTE — ASSESSMENT
[FreeTextEntry1] : Mr. KAYLEE WARNER is a 74-year-old male who presents for a follow up appointment. Past medical history includes HTN, HLD, Depression, CKD Stage III, BCC of face.  4/9/24 s/p Flexible bronchoscopy with bronchial lavage and then a bronchial ultrasound *Cytopathology of subcarinal, paratracheal (left and right) is non-diagnostic. *Cytopathology of BAL is negative for malignant cells. Thinprep show moderate cellularity composed of groups of uniform ciliated bronchial epithelial cells, scattered alveolar macrophages and mixed inflammatory cells.  5/21/24: s/p Flex Bronch Robotic assisted left upper lobe lingulectomy with mediastinal lymph node dissection. 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 + Level 5 and Level 11. Extensive lymphovascular invasion present Visceral pleura invasion not identified. Resection margin negative for tumor but is 1 mm from the margin after removal of the staple line. PD-L1 1-2%.  6/4/24: patient presented to ED for left PTX. s/p pigtail placement and discharged on 06/06/2024.  Patient has since established care and has been following with Dr. Meyers (Hem/Onc) and Dr. Anant Rae (Rad/Onc).  8/10/24: CT abdomen/pelvis through [facility] - Cholelithiasis. No pericholecystic inflammatory changes. - Diverticulosis without acute diverticulitis. - Mild increased density within the left lateral abdominal wall subcutaneous fat suggesting postsurgical changes. No abdominal wall or inguinal hernia.  8/15/24 CTA chest at [facility] - No pulmonary embolus. No evidence of pneumonia. - Interval resection of left upper lobe lung mass with mild scarring/atelectasis adjacent to the sutures and trace left pleural effusion. - Slight interval worsening of mediastinal lymphadenopathy.  Today, we discussed Mr. Warner's concerns of bulge on his anterior left chest wall below surgical incisions and a nerve like discomfort. We discussed that these findings are seen postoperatively, and I am confident that in time, they will continue to improve. I am recommending that he continue follow up care with oncology and return to care in office after next round of imaging ordered by Dr. Moore.   I have reviewed the patient's medical records and diagnostic images at time of this consultation and have made the following recommendations: 1. Return to care after next round of imaging, ordered by Dr. Moore.  All questions answered, patient verbalizes understanding and agrees to follow up accordingly.   I, Dr. Hoffmann, personally performed the evaluation and management (E/M) services for this established patient who presents today with an existing condition(s).  That E/M includes conducting the examination, assessing all new/exacerbated conditions, and establishing a plan of care.  Today, my ACP, Valerie Gonzales NP, was here to observe my evaluation and management services for this new problem/exacerbated condition to be followed going forward.

## 2024-10-04 NOTE — ASSESSMENT
[FreeTextEntry1] : Mr. KAYLEE WARNER is a 74-year-old male who presents for a follow up appointment. Past medical history includes HTN, HLD, Depression, CKD Stage III, BCC of face.  4/9/24 s/p Flexible bronchoscopy with bronchial lavage and then a bronchial ultrasound *Cytopathology of subcarinal, paratracheal (left and right) is non-diagnostic. *Cytopathology of BAL is negative for malignant cells. Thinprep show moderate cellularity composed of groups of uniform ciliated bronchial epithelial cells, scattered alveolar macrophages and mixed inflammatory cells.  5/21/24: s/p Flex Bronch Robotic assisted left upper lobe lingulectomy with mediastinal lymph node dissection. 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 + Level 5 and Level 11. Extensive lymphovascular invasion present Visceral pleura invasion not identified. Resection margin negative for tumor but is 1 mm from the margin after removal of the staple line. PD-L1 1-2%.  6/4/24: patient presented to ED for left PTX. s/p pigtail placement and discharged on 06/06/2024.  Patient has since established care and has been following with Dr. Meyers (Hem/Onc) and Dr. Anant Rae (Rad/Onc).  8/10/24: CT abdomen/pelvis through [facility] - Cholelithiasis. No pericholecystic inflammatory changes. - Diverticulosis without acute diverticulitis. - Mild increased density within the left lateral abdominal wall subcutaneous fat suggesting postsurgical changes. No abdominal wall or inguinal hernia.  8/15/24 CTA chest at [facility] - No pulmonary embolus. No evidence of pneumonia. - Interval resection of left upper lobe lung mass with mild scarring/atelectasis adjacent to the sutures and trace left pleural effusion. - Slight interval worsening of mediastinal lymphadenopathy.  Today, we discussed Mr. Warner's concerns of bulge on his anterior left chest wall below surgical incisions and a nerve like discomfort. We discussed that these findings are seen postoperatively, and I am confident that in time, they will continue to improve. I am recommending that he continue follow up care with oncology and return to care in office after next round of imaging ordered by Dr. Moore.   I have reviewed the patient's medical records and diagnostic images at time of this consultation and have made the following recommendations: 1. Return to care after next round of imaging, ordered by Dr. Moore.  All questions answered, patient verbalizes understanding and agrees to follow up accordingly.   I, Dr. Hoffmann, personally performed the evaluation and management (E/M) services for this established patient who presents today with an existing condition(s).  That E/M includes conducting the examination, assessing all new/exacerbated conditions, and establishing a plan of care.  Today, my ACP, Valerie Gonzales NP, was here to observe my evaluation and management services for this new problem/exacerbated condition to be followed going forward.

## 2024-10-04 NOTE — ASSESSMENT
[FreeTextEntry1] : Mr. KAYLEE WARNER is a 74-year-old male who presents for a follow up appointment. Past medical history includes HTN, HLD, Depression, CKD Stage III, BCC of face.  4/9/24 s/p Flexible bronchoscopy with bronchial lavage and then a bronchial ultrasound *Cytopathology of subcarinal, paratracheal (left and right) is non-diagnostic. *Cytopathology of BAL is negative for malignant cells. Thinprep show moderate cellularity composed of groups of uniform ciliated bronchial epithelial cells, scattered alveolar macrophages and mixed inflammatory cells.  5/21/24: s/p Flex Bronch Robotic assisted left upper lobe lingulectomy with mediastinal lymph node dissection. 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 + Level 5 and Level 11. Extensive lymphovascular invasion present Visceral pleura invasion not identified. Resection margin negative for tumor but is 1 mm from the margin after removal of the staple line. PD-L1 1-2%.  6/4/24: patient presented to ED for left PTX. s/p pigtail placement and discharged on 06/06/2024.  Patient has since established care and has been following with Dr. Meyers (Hem/Onc) and Dr. Anant Rae (Rad/Onc).  8/10/24: CT abdomen/pelvis through [facility] - Cholelithiasis. No pericholecystic inflammatory changes. - Diverticulosis without acute diverticulitis. - Mild increased density within the left lateral abdominal wall subcutaneous fat suggesting postsurgical changes. No abdominal wall or inguinal hernia.  8/15/24 CTA chest at [facility] - No pulmonary embolus. No evidence of pneumonia. - Interval resection of left upper lobe lung mass with mild scarring/atelectasis adjacent to the sutures and trace left pleural effusion. - Slight interval worsening of mediastinal lymphadenopathy.  Today, we discussed Mr. Warner's concerns of bulge on his anterior left chest wall below surgical incisions and a nerve like discomfort. We discussed that these findings are seen postoperatively, and I am confident that in time, they will continue to improve. I am recommending that he continue follow up care with oncology and return to care in office after next round of imaging ordered by Dr. Moore.   I have reviewed the patient's medical records and diagnostic images at time of this consultation and have made the following recommendations: 1. Return to care after next round of imaging, ordered by Dr. Moore.  All questions answered, patient verbalizes understanding and agrees to follow up accordingly.   I, Dr. Hoffmann, personally performed the evaluation and management (E/M) services for this established patient who presents today with an existing condition(s).  That E/M includes conducting the examination, assessing all new/exacerbated conditions, and establishing a plan of care.  Today, my ACP, Valeire Gonzales NP, was here to observe my evaluation and management services for this new problem/exacerbated condition to be followed going forward.

## 2024-10-04 NOTE — HISTORY OF PRESENT ILLNESS
[FreeTextEntry1] : Mr. KAYLEE WARNER is a 74-year-old male who presents for a follow up appointment. Past medical history includes HTN, HLD, Depression, CKD Stage III, BCC of face.  Originally, Mr. Warner presented with chest congestion, nasal congestion and cough, went to urgent care, found to have abnormal CXR, was given a course of ABX and steroid. Subsequent CT and PET showed AUGIE nodule and mediastinal lymphadenopathy. Referred by [facility] for evaluation.  4/9/24 s/p Flexible bronchoscopy with bronchial lavage and then a bronchial ultrasound *Cytopathology of subcarinal, paratracheal (left and right) is non-diagnostic. *Cytopathology of BAL is negative for malignant cells. Thinprep show moderate cellularity composed of groups of uniform ciliated bronchial epithelial cells, scattered alveolar macrophages and mixed inflammatory cells.  5/21/24: s/p Flex Bronch Robotic assisted left upper lobe lingulectomy with mediastinal lymph node dissection. 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 + Level 5 and Level 11. Extensive lymphovascular invasion present Visceral pleura invasion not identified. Resection margin negative for tumor but is 1 mm from the margin after removal of the staple line. PD-L1 1-2%.  6/4/24: patient presented to ED for left PTX. s/p pigtail placement and discharged on 06/06/2024.  Patient has since established care and has been following with Dr. Meyers (Hem/Onc) and Dr. Anant Rae (Rad/Onc).  8/10/24: CT abdomen/pelvis through [facility] - Cholelithiasis. No pericholecystic inflammatory changes. - Diverticulosis without acute diverticulitis. - Mild increased density within the left lateral abdominal wall subcutaneous fat suggesting postsurgical changes. No abdominal wall or inguinal hernia.  8/15/24 CTA chest at [facility]  - No pulmonary embolus. No evidence of pneumonia. - Interval resection of left upper lobe lung mass with mild scarring/atelectasis adjacent to the sutures and trace left pleural effusion. - Slight interval worsening of mediastinal lymphadenopathy.  Mr. Warner presents today for clinical evaluation; he states that he has completed radiation, and has two more rounds of chemotherapy. Mr. Warner states concern that he continues to note a bulge on his anterior left chest wall below surgical incisions. In addition, he continues to experience nerve like discomfort in his left chest wall, however, improving from his previous symptoms.

## 2024-10-11 ENCOUNTER — APPOINTMENT (OUTPATIENT)
Dept: HEMATOLOGY ONCOLOGY | Facility: CLINIC | Age: 74
End: 2024-10-11
Payer: MEDICARE

## 2024-10-11 VITALS
HEART RATE: 93 BPM | BODY MASS INDEX: 28.83 KG/M2 | WEIGHT: 194.66 LBS | HEIGHT: 69 IN | TEMPERATURE: 97.7 F | DIASTOLIC BLOOD PRESSURE: 77 MMHG | OXYGEN SATURATION: 96 % | SYSTOLIC BLOOD PRESSURE: 113 MMHG

## 2024-10-11 DIAGNOSIS — C34.90 MALIGNANT NEOPLASM OF UNSPECIFIED PART OF UNSPECIFIED BRONCHUS OR LUNG: ICD-10-CM

## 2024-10-11 PROCEDURE — 99214 OFFICE O/P EST MOD 30 MIN: CPT

## 2024-10-17 ENCOUNTER — RESULT REVIEW (OUTPATIENT)
Age: 74
End: 2024-10-17

## 2024-10-17 ENCOUNTER — APPOINTMENT (OUTPATIENT)
Age: 74
End: 2024-10-17

## 2024-10-17 LAB
ALBUMIN SERPL ELPH-MCNC: 4.3 G/DL — SIGNIFICANT CHANGE UP (ref 3.3–5)
ALP SERPL-CCNC: 94 U/L — SIGNIFICANT CHANGE UP (ref 40–120)
ALT FLD-CCNC: 75 U/L — HIGH (ref 10–45)
ANION GAP SERPL CALC-SCNC: 16 MMOL/L — SIGNIFICANT CHANGE UP (ref 5–17)
AST SERPL-CCNC: 37 U/L — SIGNIFICANT CHANGE UP (ref 10–40)
BASOPHILS # BLD AUTO: 0 K/UL — SIGNIFICANT CHANGE UP (ref 0–0.2)
BASOPHILS NFR BLD AUTO: 0.4 % — SIGNIFICANT CHANGE UP (ref 0–2)
BILIRUB SERPL-MCNC: 0.5 MG/DL — SIGNIFICANT CHANGE UP (ref 0.2–1.2)
BUN SERPL-MCNC: 20 MG/DL — SIGNIFICANT CHANGE UP (ref 7–23)
CALCIUM SERPL-MCNC: 9.7 MG/DL — SIGNIFICANT CHANGE UP (ref 8.4–10.5)
CHLORIDE SERPL-SCNC: 101 MMOL/L — SIGNIFICANT CHANGE UP (ref 96–108)
CO2 SERPL-SCNC: 24 MMOL/L — SIGNIFICANT CHANGE UP (ref 22–31)
CREAT SERPL-MCNC: 1.26 MG/DL — SIGNIFICANT CHANGE UP (ref 0.5–1.3)
EGFR: 60 ML/MIN/1.73M2 — SIGNIFICANT CHANGE UP
EOSINOPHIL # BLD AUTO: 0 K/UL — SIGNIFICANT CHANGE UP (ref 0–0.5)
EOSINOPHIL NFR BLD AUTO: 0.2 % — SIGNIFICANT CHANGE UP (ref 0–6)
GLUCOSE SERPL-MCNC: 193 MG/DL — HIGH (ref 70–99)
HCT VFR BLD CALC: 36.2 % — LOW (ref 39–50)
HGB BLD-MCNC: 12.3 G/DL — LOW (ref 13–17)
LYMPHOCYTES # BLD AUTO: 0.5 K/UL — LOW (ref 1–3.3)
LYMPHOCYTES # BLD AUTO: 8.7 % — LOW (ref 13–44)
MCHC RBC-ENTMCNC: 31.2 PG — SIGNIFICANT CHANGE UP (ref 27–34)
MCHC RBC-ENTMCNC: 33.9 G/DL — SIGNIFICANT CHANGE UP (ref 32–36)
MCV RBC AUTO: 92 FL — SIGNIFICANT CHANGE UP (ref 80–100)
MONOCYTES # BLD AUTO: 0.1 K/UL — SIGNIFICANT CHANGE UP (ref 0–0.9)
MONOCYTES NFR BLD AUTO: 2.4 % — SIGNIFICANT CHANGE UP (ref 2–14)
NEUTROPHILS # BLD AUTO: 5 K/UL — SIGNIFICANT CHANGE UP (ref 1.8–7.4)
NEUTROPHILS NFR BLD AUTO: 88.4 % — HIGH (ref 43–77)
PLATELET # BLD AUTO: 237 K/UL — SIGNIFICANT CHANGE UP (ref 150–400)
POTASSIUM SERPL-MCNC: 4.3 MMOL/L — SIGNIFICANT CHANGE UP (ref 3.5–5.3)
POTASSIUM SERPL-SCNC: 4.3 MMOL/L — SIGNIFICANT CHANGE UP (ref 3.5–5.3)
PROT SERPL-MCNC: 6.8 G/DL — SIGNIFICANT CHANGE UP (ref 6–8.3)
RBC # BLD: 3.93 M/UL — LOW (ref 4.2–5.8)
RBC # FLD: 14 % — SIGNIFICANT CHANGE UP (ref 10.3–14.5)
SODIUM SERPL-SCNC: 140 MMOL/L — SIGNIFICANT CHANGE UP (ref 135–145)
WBC # BLD: 5.6 K/UL — SIGNIFICANT CHANGE UP (ref 3.8–10.5)
WBC # FLD AUTO: 5.6 K/UL — SIGNIFICANT CHANGE UP (ref 3.8–10.5)

## 2024-10-18 ENCOUNTER — APPOINTMENT (OUTPATIENT)
Dept: INTERNAL MEDICINE | Facility: CLINIC | Age: 74
End: 2024-10-18

## 2024-10-25 ENCOUNTER — EMERGENCY (EMERGENCY)
Facility: HOSPITAL | Age: 74
LOS: 1 days | Discharge: HOME | End: 2024-10-25
Attending: EMERGENCY MEDICINE
Payer: MEDICARE

## 2024-10-25 VITALS
SYSTOLIC BLOOD PRESSURE: 135 MMHG | RESPIRATION RATE: 18 BRPM | HEART RATE: 126 BPM | TEMPERATURE: 99 F | OXYGEN SATURATION: 98 % | WEIGHT: 188.27 LBS | DIASTOLIC BLOOD PRESSURE: 91 MMHG | HEIGHT: 69.88 IN

## 2024-10-25 VITALS
RESPIRATION RATE: 18 BRPM | TEMPERATURE: 98 F | SYSTOLIC BLOOD PRESSURE: 134 MMHG | HEART RATE: 81 BPM | OXYGEN SATURATION: 95 % | DIASTOLIC BLOOD PRESSURE: 79 MMHG

## 2024-10-25 DIAGNOSIS — Z98.890 OTHER SPECIFIED POSTPROCEDURAL STATES: Chronic | ICD-10-CM

## 2024-10-25 LAB
ALBUMIN SERPL ELPH-MCNC: 4 G/DL — SIGNIFICANT CHANGE UP (ref 3.3–5.2)
ALP SERPL-CCNC: 87 U/L — SIGNIFICANT CHANGE UP (ref 40–120)
ALT FLD-CCNC: 35 U/L — SIGNIFICANT CHANGE UP
ANION GAP SERPL CALC-SCNC: 11 MMOL/L — SIGNIFICANT CHANGE UP (ref 5–17)
ANISOCYTOSIS BLD QL: SLIGHT — SIGNIFICANT CHANGE UP
AST SERPL-CCNC: 28 U/L — SIGNIFICANT CHANGE UP
BASOPHILS # BLD AUTO: 0 K/UL — SIGNIFICANT CHANGE UP (ref 0–0.2)
BASOPHILS NFR BLD AUTO: 0 % — SIGNIFICANT CHANGE UP (ref 0–2)
BILIRUB SERPL-MCNC: 0.9 MG/DL — SIGNIFICANT CHANGE UP (ref 0.4–2)
BUN SERPL-MCNC: 33.2 MG/DL — HIGH (ref 8–20)
CALCIUM SERPL-MCNC: 9.5 MG/DL — SIGNIFICANT CHANGE UP (ref 8.4–10.5)
CHLORIDE SERPL-SCNC: 99 MMOL/L — SIGNIFICANT CHANGE UP (ref 96–108)
CO2 SERPL-SCNC: 27 MMOL/L — SIGNIFICANT CHANGE UP (ref 22–29)
CREAT SERPL-MCNC: 1.25 MG/DL — SIGNIFICANT CHANGE UP (ref 0.5–1.3)
EGFR: 61 ML/MIN/1.73M2 — SIGNIFICANT CHANGE UP
EOSINOPHIL # BLD AUTO: 0.06 K/UL — SIGNIFICANT CHANGE UP (ref 0–0.5)
EOSINOPHIL NFR BLD AUTO: 3.5 % — SIGNIFICANT CHANGE UP (ref 0–6)
GAS PNL BLDV: SIGNIFICANT CHANGE UP
GIANT PLATELETS BLD QL SMEAR: PRESENT — SIGNIFICANT CHANGE UP
GLUCOSE SERPL-MCNC: 116 MG/DL — HIGH (ref 70–99)
HCT VFR BLD CALC: 29.1 % — LOW (ref 39–50)
HGB BLD-MCNC: 10.4 G/DL — LOW (ref 13–17)
LYMPHOCYTES # BLD AUTO: 0.46 K/UL — LOW (ref 1–3.3)
LYMPHOCYTES # BLD AUTO: 25.2 % — SIGNIFICANT CHANGE UP (ref 13–44)
MAGNESIUM SERPL-MCNC: 1.7 MG/DL — SIGNIFICANT CHANGE UP (ref 1.6–2.6)
MANUAL SMEAR VERIFICATION: SIGNIFICANT CHANGE UP
MCHC RBC-ENTMCNC: 31.6 PG — SIGNIFICANT CHANGE UP (ref 27–34)
MCHC RBC-ENTMCNC: 35.7 GM/DL — SIGNIFICANT CHANGE UP (ref 32–36)
MCV RBC AUTO: 88.4 FL — SIGNIFICANT CHANGE UP (ref 80–100)
MONOCYTES # BLD AUTO: 0.08 K/UL — SIGNIFICANT CHANGE UP (ref 0–0.9)
MONOCYTES NFR BLD AUTO: 4.3 % — SIGNIFICANT CHANGE UP (ref 2–14)
NEUTROPHILS # BLD AUTO: 1.21 K/UL — LOW (ref 1.8–7.4)
NEUTROPHILS NFR BLD AUTO: 67 % — SIGNIFICANT CHANGE UP (ref 43–77)
PHOSPHATE SERPL-MCNC: 2.7 MG/DL — SIGNIFICANT CHANGE UP (ref 2.4–4.7)
PLAT MORPH BLD: NORMAL — SIGNIFICANT CHANGE UP
PLATELET # BLD AUTO: 93 K/UL — LOW (ref 150–400)
POLYCHROMASIA BLD QL SMEAR: SLIGHT — SIGNIFICANT CHANGE UP
POTASSIUM SERPL-MCNC: 4.7 MMOL/L — SIGNIFICANT CHANGE UP (ref 3.5–5.3)
POTASSIUM SERPL-SCNC: 4.7 MMOL/L — SIGNIFICANT CHANGE UP (ref 3.5–5.3)
PROT SERPL-MCNC: 6.7 G/DL — SIGNIFICANT CHANGE UP (ref 6.6–8.7)
RAPID RVP RESULT: SIGNIFICANT CHANGE UP
RBC # BLD: 3.29 M/UL — LOW (ref 4.2–5.8)
RBC # FLD: 13.5 % — SIGNIFICANT CHANGE UP (ref 10.3–14.5)
RBC BLD AUTO: ABNORMAL
SARS-COV-2 RNA SPEC QL NAA+PROBE: SIGNIFICANT CHANGE UP
SODIUM SERPL-SCNC: 137 MMOL/L — SIGNIFICANT CHANGE UP (ref 135–145)
WBC # BLD: 1.81 K/UL — LOW (ref 3.8–10.5)
WBC # FLD AUTO: 1.81 K/UL — LOW (ref 3.8–10.5)

## 2024-10-25 PROCEDURE — 71046 X-RAY EXAM CHEST 2 VIEWS: CPT

## 2024-10-25 PROCEDURE — 0225U NFCT DS DNA&RNA 21 SARSCOV2: CPT

## 2024-10-25 PROCEDURE — 93010 ELECTROCARDIOGRAM REPORT: CPT

## 2024-10-25 PROCEDURE — 82330 ASSAY OF CALCIUM: CPT

## 2024-10-25 PROCEDURE — 84132 ASSAY OF SERUM POTASSIUM: CPT

## 2024-10-25 PROCEDURE — 83735 ASSAY OF MAGNESIUM: CPT

## 2024-10-25 PROCEDURE — 83605 ASSAY OF LACTIC ACID: CPT

## 2024-10-25 PROCEDURE — 99285 EMERGENCY DEPT VISIT HI MDM: CPT

## 2024-10-25 PROCEDURE — 84295 ASSAY OF SERUM SODIUM: CPT

## 2024-10-25 PROCEDURE — 82435 ASSAY OF BLOOD CHLORIDE: CPT

## 2024-10-25 PROCEDURE — 96375 TX/PRO/DX INJ NEW DRUG ADDON: CPT

## 2024-10-25 PROCEDURE — 80053 COMPREHEN METABOLIC PANEL: CPT

## 2024-10-25 PROCEDURE — 99285 EMERGENCY DEPT VISIT HI MDM: CPT | Mod: 25

## 2024-10-25 PROCEDURE — 71046 X-RAY EXAM CHEST 2 VIEWS: CPT | Mod: 26

## 2024-10-25 PROCEDURE — 85025 COMPLETE CBC W/AUTO DIFF WBC: CPT

## 2024-10-25 PROCEDURE — 82947 ASSAY GLUCOSE BLOOD QUANT: CPT

## 2024-10-25 PROCEDURE — 96374 THER/PROPH/DIAG INJ IV PUSH: CPT

## 2024-10-25 PROCEDURE — 87040 BLOOD CULTURE FOR BACTERIA: CPT

## 2024-10-25 PROCEDURE — 85018 HEMOGLOBIN: CPT

## 2024-10-25 PROCEDURE — 85014 HEMATOCRIT: CPT

## 2024-10-25 PROCEDURE — 36415 COLL VENOUS BLD VENIPUNCTURE: CPT

## 2024-10-25 PROCEDURE — 82803 BLOOD GASES ANY COMBINATION: CPT

## 2024-10-25 PROCEDURE — 93005 ELECTROCARDIOGRAM TRACING: CPT

## 2024-10-25 PROCEDURE — 84100 ASSAY OF PHOSPHORUS: CPT

## 2024-10-25 RX ORDER — SODIUM CHLORIDE 0.9 % (FLUSH) 0.9 %
1000 SYRINGE (ML) INJECTION ONCE
Refills: 0 | Status: ACTIVE | OUTPATIENT
Start: 2024-10-25

## 2024-10-25 RX ORDER — VANCOMYCIN HCL-SODIUM CHLORIDE IV SOLN 1.5 GM/250ML-0.9% 1.5-0.9/25 GM/ML-%
1000 SOLUTION INTRAVENOUS ONCE
Refills: 0 | Status: COMPLETED | OUTPATIENT
Start: 2024-10-25 | End: 2024-10-25

## 2024-10-25 RX ORDER — PIPERACILLIN SODIUM AND TAZOBACTAM SODIUM 12; 1.5 G/60ML; G/60ML
3.38 INJECTION, POWDER, LYOPHILIZED, FOR SOLUTION INTRAVENOUS ONCE
Refills: 0 | Status: COMPLETED | OUTPATIENT
Start: 2024-10-25 | End: 2024-10-25

## 2024-10-25 RX ORDER — SODIUM CHLORIDE 0.9 % (FLUSH) 0.9 %
1000 SYRINGE (ML) INJECTION ONCE
Refills: 0 | Status: DISCONTINUED | OUTPATIENT
Start: 2024-10-25 | End: 2024-10-25

## 2024-10-25 RX ADMIN — PIPERACILLIN SODIUM AND TAZOBACTAM SODIUM 200 GRAM(S): 12; 1.5 INJECTION, POWDER, LYOPHILIZED, FOR SOLUTION INTRAVENOUS at 18:26

## 2024-10-25 RX ADMIN — VANCOMYCIN HCL-SODIUM CHLORIDE IV SOLN 1.5 GM/250ML-0.9% 250 MILLIGRAM(S): 1.5-0.9/25 SOLUTION at 19:22

## 2024-10-25 NOTE — ED PROVIDER NOTE - CLINICAL SUMMARY MEDICAL DECISION MAKING FREE TEXT BOX
HPI: 74-year-old male past medical history of lung cancer status post left lobar resection and on chemotherapy, last round of chemotherapy 8 days ago presents complaining of generalized weakness.  Patient states he had diarrhea over the course the last week, last episode 3 days ago.  Denies any recent antibiotic use.  No recent fevers or chills.  Patient states that he gets dizzy every time he stands up and walks around which has been worsening today.  No known sick contacts.  No other current complaints at this time.    ROS:   General: See HPI  Respiratory: No cough, no dyspnea, no pleuritic chest pain  Cardiac: no chest pain, no palpitations, no edema, no jvd  Abdomen: No abdominal pain, no nausea, no vomiting, no current diarrhea  : No dysuria, no increase frequency, no urgency, No discharge  Musculoskeletal: No myalgia, no arthralgia  Neurologic: No headache, no vertigo, no paresthesia, no focal deficits  Skin: No rash, no evidence of trauma  All other ROS are negative    PE:  General: NAD; A&O x3   Head: NC/AT  Eyes: PERRL, EOMI  ENT: Airway patent, dry mucous membranes  Pulmonary: CTA b/l, symmetric breath sounds. No W/R/R.  Cardiac: s1s2, RRR, no M,G,R, No JVD  Abdomen: +BS, ND, NT, soft, no guarding, no rebound, no masses , no rigidity  : No CVA TTP, no suprapubic TTP  Back: Normal  spine  Extremities: FROM, symmetric pulses, capillary refill < 2 seconds, no edema, 5/5 strength in b/l UE and LE  Skin: no rash or bruising  Neurologic: alert, speech clear, no focal deficits  Psych: nl mood/affect, nl insight.    MDM: 74-year-old male past medical history of lung cancer status post left lobar resection and on chemotherapy, last round of chemotherapy 8 days ago presents complaining of generalized weakness with associated dizziness.  Low suspicion ACS, independent review of EKG shows NSR at 80 bpm without STEMI or T wave changes, , QRS 90, QTc 408.  Possible occult infection given chemotherapy 8 days ago with concern for hesham of WBCs.  Will obtain CBC, CMP to evaluate ANC, obtain VBG, BC, UA, UC, magnesium, phosphorus for possible electrolyte abnormalities versus dehydration given recent diarrheal illness and dry mucous membranes.  Will obtain RVP.  Will  give prophylactic antibiotics.  Disposition pending results. HPI: 74-year-old male past medical history of lung cancer status post left lobar resection and on chemotherapy, last round of chemotherapy 8 days ago presents complaining of generalized weakness.  Patient states he had diarrhea over the course the last week, last episode 3 days ago.  Denies any recent antibiotic use.  No recent fevers or chills.  Patient states that he gets dizzy every time he stands up and walks around which has been worsening today.  No known sick contacts.  No other current complaints at this time.    ROS:   General: See HPI  Respiratory: No cough, no dyspnea, no pleuritic chest pain  Cardiac: no chest pain, no palpitations, no edema, no jvd  Abdomen: No abdominal pain, no nausea, no vomiting, no current diarrhea  : No dysuria, no increase frequency, no urgency, No discharge  Musculoskeletal: No myalgia, no arthralgia  Neurologic: No headache, no vertigo, no paresthesia, no focal deficits  Skin: No rash, no evidence of trauma  All other ROS are negative    PE:  General: NAD; A&O x3   Head: NC/AT  Eyes: PERRL, EOMI  ENT: Airway patent, dry mucous membranes  Pulmonary: CTA b/l, symmetric breath sounds. No W/R/R.  Cardiac: s1s2, RRR, no M,G,R, No JVD  Abdomen: +BS, ND, NT, soft, no guarding, no rebound, no masses , no rigidity  : No CVA TTP, no suprapubic TTP  Back: Normal  spine  Extremities: FROM, symmetric pulses, capillary refill < 2 seconds, no edema, 5/5 strength in b/l UE and LE  Skin: no rash or bruising  Neurologic: alert, speech clear, no focal deficits  Psych: nl mood/affect, nl insight.    MDM: 74-year-old male past medical history of lung cancer status post left lobar resection and on chemotherapy, last round of chemotherapy 8 days ago presents complaining of generalized weakness with associated dizziness.  Low suspicion ACS, independent review of EKG shows NSR at 80 bpm without STEMI or T wave changes, , QRS 90, QTc 408.  Possible occult infection given chemotherapy 8 days ago with concern for hesham of WBCs.  Will obtain CBC, CMP to evaluate ANC, obtain VBG, BC, UA, UC, magnesium, phosphorus for possible electrolyte abnormalities versus dehydration given recent diarrheal illness and dry mucous membranes.  Will obtain RVP and CXR.  Will  give prophylactic antibiotics.  Disposition pending results.

## 2024-10-25 NOTE — ED PROVIDER NOTE - NSFOLLOWUPINSTRUCTIONS_ED_ALL_ED_FT
You are advised to please follow up with your primary care doctor within the next 24 hours and return to the Emergency Department for worsening symptoms or any other concerns.  Your doctor may call [phone] to follow up on the specific results of the tests performed today in the emergency department.    Dehydration, Adult  Dehydration is a condition in which there is not enough water or other fluids in the body. This happens when a person loses more fluids than they take in. Important organs, such as the kidneys, brain, and heart, cannot function without a proper amount of fluids. Any loss of fluids from the body can lead to dehydration.    Dehydration can be mild, moderate, or severe. It should be treated right away to prevent it from becoming severe.    What are the causes?  Dehydration may be caused by:  Health conditions, such as diarrhea, vomiting, fever, infection, or sweating or urinating a lot.  Not drinking enough fluids.  Certain medicines, such as medicines that remove excess fluid from the body (diuretics).  Lack of safe drinking water.  Not being able to get enough water and food.  What increases the risk?  The following factors may make you more likely to develop this condition:  Having a long-term (chronic) illness that has not been treated properly, such as diabetes, heart disease, or kidney disease.  Being 65 years of age or older.  Having a disability.  Living in a place that is high in altitude, where thinner, drier air causes more fluid loss.  Doing exercises that put stress on your body for a long time (endurance sports).  Being active in a hot climate.  What are the signs or symptoms?  Symptoms of dehydration depend on how severe it is.    Mild or moderate dehydration    Thirst.  Dry lips or dry mouth.  Dizziness or light-headedness.  Muscle cramps.  Dark urine. Urine may be the color of tea.  Less urine or tears produced than usual.  Headache.  Severe dehydration    Changes in skin. Your skin may be cold and clammy, blotchy, or pale. Your skin also may not return to normal after being lightly pinched and released.  Little or no tears, urine, or sweat.  Rapid breathing and low blood pressure. Your pulse may be weak or may be faster than 100 beats per minute when you are sitting still.  Other changes, such as:  Feeling very thirsty.  Sunken eyes.  Cold hands and feet.  Confusion.  Being very tired (lethargic) or having trouble waking from sleep.  Short-term weight loss.  Loss of consciousness.  How is this diagnosed?  This condition is diagnosed based on your symptoms and a physical exam. You may have blood and urine tests to help confirm the diagnosis.    How is this treated?  A person's hand, showing an inserted IV catheter.   Treatment for this condition depends on how severe it is. Treatment should be started right away. Do not wait until dehydration becomes severe. Severe dehydration is an emergency and needs to be treated in a hospital.  Mild or moderate dehydration can be treated at home. You may be asked to:  Drink more fluids.  Drink an oral rehydration solution (ORS). This drink restores fluids, salts, and minerals in the blood (electrolytes).  Stop any activities that caused dehydration, such as exercise.  Cool off with cool compresses, cool mist, or cool fluids, if heat or too much sweat caused your condition.  Take medicine to treat fever, if fever caused your condition.  Take medicine to treat nausea and diarrhea, if vomiting or diarrhea caused your condition.  Severe dehydration can be treated:  With IV fluids.  By correcting abnormal levels of electrolytes in your body.  By treating the underlying cause of dehydration.  Follow these instructions at home:  Oral rehydration solution    A glass of water with a spoonful of ORS ready to be added to it.  If told by your health care provider, drink an ORS:  Make an ORS by following instructions on the package.  Start by drinking small amounts, about ½ cup (120 mL) every 5–10 minutes.  Slowly increase how much you drink until you have taken the amount recommended by your health care provider.  Eating and drinking    A person drinking water from a glass.   Drink enough clear fluid to keep your urine pale yellow. If you were told to drink an ORS, finish the ORS first and then start slowly drinking other clear fluids. Drink fluids such as:  Water. Do not drink only water. Doing that can lead to hyponatremia, which is having too little salt (sodium) in the body.  Water from ice chips you suck on.  Diluted fruit juice. This is fruit juice that you have added water to.  Low-calorie sports drinks.  Eat foods that contain a healthy balance of electrolytes, such as bananas, oranges, potatoes, tomatoes, and spinach.  Do not drink alcohol.  Avoid the following:  Drinks that contain a lot of sugar. These include high-calorie sports drinks, fruit juice that is not diluted, and soda.  Caffeine.  Foods that are greasy or contain a lot of fat or sugar.  General instructions    Take over-the-counter and prescription medicines only as told by your health care provider.  Do not take sodium tablets. Doing that can lead to having too much sodium in the body (hypernatremia).  Return to your normal activities as told by your health care provider. Ask your health care provider what activities are safe for you.  Keep all follow-up visits. Your health care provider may need to check your progress and suggest new ways to treat your condition.  Contact a health care provider if:  You have muscle cramps, pain, or discomfort, such as:  Pain in your abdomen and the pain gets worse or stays in one area.  Stiff neck.  You have a rash.  You are more irritable than usual.  You are sleepier or have a harder time waking.  You feel weak or dizzy.  You feel very thirsty.  Get help right away if:  You have symptoms of severe dehydration.  You vomit every time you eat or drink.  Your vomiting gets worse, does not go away, or includes blood or green matter (bile).  You are getting treatment but symptoms are getting worse.  You have a fever.  You have a severe headache.  You have:  Diarrhea that gets worse or does not go away.  Blood in your stool. This may cause stool to look black and tarry.  Not urinating, or urinating only a small amount of very dark urine, within 6–8 hours.  You have trouble breathing.  These symptoms may be an emergency. Get help right away.  Do not wait to see if the symptoms will go away.  Do not drive yourself to the hospital. Call 911.

## 2024-10-25 NOTE — ED ADULT NURSE REASSESSMENT NOTE - NS ED NURSE REASSESS COMMENT FT1
Pt received from day rn @ 1930. AOx4, ambulates w/ assistance. Pt resting comfortably at this time, no complaints, in no acute distress. All safety/infection/fall precautions maintained. Call bell within reach. Vitals as documented. Son @ bedside.

## 2024-10-25 NOTE — ED ADULT NURSE REASSESSMENT NOTE - NSFALLHARMRISKINTERV_ED_ALL_ED

## 2024-10-25 NOTE — ED PROVIDER NOTE - DISCHARGE REVIEW MATERIAL PRESENTED
Procedure:  KNEE ARTHROSCOPY WITH POSSIBLE MENISCECTOMY (Left Knee)    Relevant Problems   CARDIO   (+) Hyperlipidemia   (+) Hypertension      GI/HEPATIC   (+) Dysphagia   (+) GERD (gastroesophageal reflux disease)   (+) Gastroesophageal reflux disease   (+) Paraesophageal hernia      MUSCULOSKELETAL   (+) Primary osteoarthritis of both knees   (+) Sacroiliitis (HCC)      NEURO/PSYCH   (+) Chronic pain syndrome        Physical Exam    Airway    Mallampati score: I  TM Distance: >3 FB  Neck ROM: full     Dental   upper dentures,     Cardiovascular      Pulmonary      Other Findings        Anesthesia Plan  ASA Score- 2     Anesthesia Type- general with ASA Monitors  Additional Monitors:   Airway Plan: LMA  Plan Factors-Exercise tolerance (METS): >4 METS  Chart reviewed  Existing labs reviewed  Patient summary reviewed  Patient is not a current smoker  Induction- intravenous  Postoperative Plan- Plan for postoperative opioid use  Informed Consent- Anesthetic plan and risks discussed with patient  I personally reviewed this patient with the CRNA  Discussed and agreed on the Anesthesia Plan with the CRNA  Oscar Ham .

## 2024-10-25 NOTE — ED ADULT NURSE NOTE - OBJECTIVE STATEMENT
AxOx4. Patient c/o generalized weakness hx lung cancer and received 3 doses of chemotherapy. IV placed, labs sent, medicated as per orders.

## 2024-10-25 NOTE — ED PROVIDER NOTE - NS ED ROS FT
Patient admitted to room 202 direct admit from [facility] @ 4603. Patient oriented to room, call light, bed rails, phone, lights and bathroom. Patient instructed about the schedule of the day including: vital sign frequency, lab draws, possible tests, frequency of MD and staff rounds. Patient instructed about prescribed diet, how/when to call for meal service, and television. Bed locked, in lowest position, side rails up 2/4, call light within reach. BP 90/60   Pulse 111   Temp 97.5 °F (36.4 °C) (Oral)   Resp 18   Wt 243 lb 14.4 oz (110.6 kg)   LMP 10/24/2012   SpO2 100%   BMI 41.87 kg/m²  Unable to obtain telemetry box at this time; none available. 6090: Dr. Deshawn Rice aware of admit and will place orders for patient. Dr. Chan: See attending attestation.

## 2024-10-25 NOTE — ED ADULT TRIAGE NOTE - IDEAL BODY WEIGHT(KG)
74 MD Notification    Notified Person: MD    Notified Person Name: JHONATHAN WOODWARD     Notification Date/Time: 2/23/23 0313    Notification Interaction: amcom    Purpose of Notification: 5518 B.M.  Just FYI, pt's BP at 87/46 at 10pm and 89/41 at 3am. Pt AOx4, denies dizziness nor lightheadedness when asked at 10pm and pt asleep at 3am check. Pt on NS 75ml/hr for 1L. Is this ok for u? thanks Charity RN *[phone]    Orders Received:    Comments:

## 2024-10-25 NOTE — ED PROVIDER NOTE - PROGRESS NOTE DETAILS
MD Demetris: received signout pending xray. patient arrived after decreased PO intake and diarrhea. with elevated BUN. Afebrile for entire stay. denies cough. denies abd pain. denies vomiting or nausea. discussed patient's leukopenia in the setting of chemo, but no fever. discussed doing CT for further investigation, but patient prefers a watch/wait approach. given strict return precautions.

## 2024-10-25 NOTE — ED PROVIDER NOTE - PATIENT PORTAL LINK FT
You can access the FollowMyHealth Patient Portal offered by [facility] by registering at the following website: [email or web address]. By joining [facility]’s FollowMyHealth portal, you will also be able to view your health information using other applications (apps) compatible with our system.

## 2024-10-28 NOTE — ED POST DISCHARGE NOTE - RESULT SUMMARY
official cxr reaing shows patchy LLL PNA. Spoke with Pt and he has no fever, no cough, no sob. Feeling well. reports h/o surgery in left lung. PT has follow up with his doctor this Wednesday and will advise of findings. ED return precautions disused.

## 2024-10-30 ENCOUNTER — OUTPATIENT (OUTPATIENT)
Dept: OUTPATIENT SERVICES | Facility: HOSPITAL | Age: 74
LOS: 1 days | Discharge: HOME | End: 2024-10-30

## 2024-10-30 ENCOUNTER — APPOINTMENT (OUTPATIENT)
Dept: INTERNAL MEDICINE | Facility: CLINIC | Age: 74
End: 2024-10-30
Payer: MEDICARE

## 2024-10-30 VITALS
HEIGHT: 69 IN | DIASTOLIC BLOOD PRESSURE: 56 MMHG | SYSTOLIC BLOOD PRESSURE: 88 MMHG | HEART RATE: 105 BPM | BODY MASS INDEX: 27.55 KG/M2 | WEIGHT: 186 LBS

## 2024-10-30 DIAGNOSIS — D64.9 ANEMIA, UNSPECIFIED: ICD-10-CM

## 2024-10-30 DIAGNOSIS — Z98.890 OTHER SPECIFIED POSTPROCEDURAL STATES: Chronic | ICD-10-CM

## 2024-10-30 DIAGNOSIS — R59.0 LOCALIZED ENLARGED LYMPH NODES: ICD-10-CM

## 2024-10-30 DIAGNOSIS — E78.5 HYPERLIPIDEMIA, UNSPECIFIED: ICD-10-CM

## 2024-10-30 DIAGNOSIS — I10 ESSENTIAL (PRIMARY) HYPERTENSION: ICD-10-CM

## 2024-10-30 DIAGNOSIS — N18.9 CHRONIC KIDNEY DISEASE, UNSPECIFIED: ICD-10-CM

## 2024-10-30 LAB
CULTURE RESULTS: SIGNIFICANT CHANGE UP
SPECIMEN SOURCE: SIGNIFICANT CHANGE UP

## 2024-10-30 PROCEDURE — 99214 OFFICE O/P EST MOD 30 MIN: CPT

## 2024-10-30 PROCEDURE — 36415 COLL VENOUS BLD VENIPUNCTURE: CPT

## 2024-10-30 PROCEDURE — G2211 COMPLEX E/M VISIT ADD ON: CPT

## 2024-10-30 RX ORDER — LOSARTAN POTASSIUM 50 MG/1
50 TABLET, FILM COATED ORAL DAILY
Qty: 90 | Refills: 1 | Status: ACTIVE | COMMUNITY
Start: 2024-10-30 | End: 1900-01-01

## 2024-10-31 LAB
ALBUMIN SERPL ELPH-MCNC: 4.2 G/DL
ALP BLD-CCNC: 99 U/L
ALT SERPL-CCNC: 34 U/L
ANION GAP SERPL CALC-SCNC: 14 MMOL/L
AST SERPL-CCNC: 29 U/L
BASOPHILS # BLD AUTO: 0 K/UL
BASOPHILS NFR BLD AUTO: 0 %
BILIRUB SERPL-MCNC: 0.5 MG/DL
BUN SERPL-MCNC: 27 MG/DL
CALCIUM SERPL-MCNC: 9.8 MG/DL
CHLORIDE SERPL-SCNC: 100 MMOL/L
CO2 SERPL-SCNC: 26 MMOL/L
CREAT SERPL-MCNC: 1.48 MG/DL
EGFR: 50 ML/MIN/1.73M2
EOSINOPHIL # BLD AUTO: 0.11 K/UL
EOSINOPHIL NFR BLD AUTO: 5 %
GLUCOSE SERPL-MCNC: 186 MG/DL
HCT VFR BLD CALC: 28.3 %
HGB BLD-MCNC: 10 G/DL
IMM GRANULOCYTES NFR BLD AUTO: 0.5 %
LYMPHOCYTES # BLD AUTO: 0.43 K/UL
LYMPHOCYTES NFR BLD AUTO: 19.4 %
MAN DIFF?: NORMAL
MCHC RBC-ENTMCNC: 31.9 PG
MCHC RBC-ENTMCNC: 35.3 G/DL
MCV RBC AUTO: 90.4 FL
MONOCYTES # BLD AUTO: 0.33 K/UL
MONOCYTES NFR BLD AUTO: 14.9 %
NEUTROPHILS # BLD AUTO: 1.34 K/UL
NEUTROPHILS NFR BLD AUTO: 60.2 %
PLATELET # BLD AUTO: 37 K/UL
POTASSIUM SERPL-SCNC: 4 MMOL/L
PROT SERPL-MCNC: 6.6 G/DL
RBC # BLD: 3.13 M/UL
RBC # FLD: 14 %
SODIUM SERPL-SCNC: 139 MMOL/L
WBC # FLD AUTO: 2.22 K/UL

## 2024-11-05 ENCOUNTER — NON-APPOINTMENT (OUTPATIENT)
Age: 74
End: 2024-11-05

## 2024-11-05 ENCOUNTER — APPOINTMENT (OUTPATIENT)
Dept: HEMATOLOGY ONCOLOGY | Facility: CLINIC | Age: 74
End: 2024-11-05
Payer: MEDICARE

## 2024-11-05 ENCOUNTER — RESULT REVIEW (OUTPATIENT)
Age: 74
End: 2024-11-05

## 2024-11-05 VITALS
WEIGHT: 192.46 LBS | OXYGEN SATURATION: 97 % | SYSTOLIC BLOOD PRESSURE: 131 MMHG | HEIGHT: 69 IN | BODY MASS INDEX: 28.51 KG/M2 | TEMPERATURE: 96.7 F | DIASTOLIC BLOOD PRESSURE: 77 MMHG | HEART RATE: 92 BPM

## 2024-11-05 DIAGNOSIS — C34.90 MALIGNANT NEOPLASM OF UNSPECIFIED PART OF UNSPECIFIED BRONCHUS OR LUNG: ICD-10-CM

## 2024-11-05 LAB
BASOPHILS # BLD AUTO: 0 K/UL — SIGNIFICANT CHANGE UP (ref 0–0.2)
BASOPHILS NFR BLD AUTO: 0.7 % — SIGNIFICANT CHANGE UP (ref 0–2)
EOSINOPHIL # BLD AUTO: 0.2 K/UL — SIGNIFICANT CHANGE UP (ref 0–0.5)
EOSINOPHIL NFR BLD AUTO: 6.3 % — HIGH (ref 0–6)
HCT VFR BLD CALC: 30 % — LOW (ref 39–50)
HGB BLD-MCNC: 10 G/DL — LOW (ref 13–17)
LYMPHOCYTES # BLD AUTO: 0.4 K/UL — LOW (ref 1–3.3)
LYMPHOCYTES # BLD AUTO: 15.2 % — SIGNIFICANT CHANGE UP (ref 13–44)
MCHC RBC-ENTMCNC: 31.9 PG — SIGNIFICANT CHANGE UP (ref 27–34)
MCHC RBC-ENTMCNC: 33.4 G/DL — SIGNIFICANT CHANGE UP (ref 32–36)
MCV RBC AUTO: 95.3 FL — SIGNIFICANT CHANGE UP (ref 80–100)
MONOCYTES # BLD AUTO: 0.4 K/UL — SIGNIFICANT CHANGE UP (ref 0–0.9)
MONOCYTES NFR BLD AUTO: 13.7 % — SIGNIFICANT CHANGE UP (ref 2–14)
NEUTROPHILS # BLD AUTO: 1.8 K/UL — SIGNIFICANT CHANGE UP (ref 1.8–7.4)
NEUTROPHILS NFR BLD AUTO: 64.1 % — SIGNIFICANT CHANGE UP (ref 43–77)
PLATELET # BLD AUTO: 135 K/UL — LOW (ref 150–400)
RBC # BLD: 3.14 M/UL — LOW (ref 4.2–5.8)
RBC # FLD: 16.1 % — HIGH (ref 10.3–14.5)
WBC # BLD: 2.9 K/UL — LOW (ref 3.8–10.5)
WBC # FLD AUTO: 2.9 K/UL — LOW (ref 3.8–10.5)

## 2024-11-05 PROCEDURE — 99214 OFFICE O/P EST MOD 30 MIN: CPT

## 2024-11-05 RX ORDER — OSIMERTINIB 80 1/1
80 TABLET, FILM COATED ORAL DAILY
Qty: 30 | Refills: 6 | Status: ACTIVE | COMMUNITY
Start: 2024-11-05 | End: 1900-01-01

## 2024-11-07 ENCOUNTER — APPOINTMENT (OUTPATIENT)
Age: 74
End: 2024-11-07

## 2024-11-15 ENCOUNTER — APPOINTMENT (OUTPATIENT)
Dept: CT IMAGING | Facility: CLINIC | Age: 74
End: 2024-11-15
Payer: MEDICARE

## 2024-11-15 ENCOUNTER — INPATIENT (INPATIENT)
Facility: HOSPITAL | Age: 74
LOS: 1 days | Discharge: HOME | DRG: 176 | End: 2024-11-17
Attending: STUDENT IN AN ORGANIZED HEALTH CARE EDUCATION/TRAINING PROGRAM | Admitting: HOSPITALIST
Payer: MEDICARE

## 2024-11-15 ENCOUNTER — OUTPATIENT (OUTPATIENT)
Dept: OUTPATIENT SERVICES | Facility: HOSPITAL | Age: 74
LOS: 1 days | End: 2024-11-15
Payer: MEDICARE

## 2024-11-15 VITALS
HEIGHT: 69 IN | DIASTOLIC BLOOD PRESSURE: 89 MMHG | RESPIRATION RATE: 16 BRPM | SYSTOLIC BLOOD PRESSURE: 161 MMHG | TEMPERATURE: 98 F | WEIGHT: 215.39 LBS | HEART RATE: 67 BPM | OXYGEN SATURATION: 97 %

## 2024-11-15 DIAGNOSIS — Z98.890 OTHER SPECIFIED POSTPROCEDURAL STATES: Chronic | ICD-10-CM

## 2024-11-15 DIAGNOSIS — C34.90 MALIGNANT NEOPLASM OF UNSPECIFIED PART OF UNSPECIFIED BRONCHUS OR LUNG: ICD-10-CM

## 2024-11-15 DIAGNOSIS — I26.99 OTHER PULMONARY EMBOLISM WITHOUT ACUTE COR PULMONALE: ICD-10-CM

## 2024-11-15 LAB
ALBUMIN SERPL ELPH-MCNC: 3.9 G/DL — SIGNIFICANT CHANGE UP (ref 3.3–5.2)
ALP SERPL-CCNC: 96 U/L — SIGNIFICANT CHANGE UP (ref 40–120)
ALT FLD-CCNC: 35 U/L — SIGNIFICANT CHANGE UP
ANION GAP SERPL CALC-SCNC: 13 MMOL/L — SIGNIFICANT CHANGE UP (ref 5–17)
APTT BLD: 30.3 SEC — SIGNIFICANT CHANGE UP (ref 24.5–35.6)
AST SERPL-CCNC: 38 U/L — SIGNIFICANT CHANGE UP
BASOPHILS # BLD AUTO: 0.04 K/UL — SIGNIFICANT CHANGE UP (ref 0–0.2)
BASOPHILS NFR BLD AUTO: 0.8 % — SIGNIFICANT CHANGE UP (ref 0–2)
BILIRUB SERPL-MCNC: 0.4 MG/DL — SIGNIFICANT CHANGE UP (ref 0.4–2)
BUN SERPL-MCNC: 15.5 MG/DL — SIGNIFICANT CHANGE UP (ref 8–20)
CALCIUM SERPL-MCNC: 9.1 MG/DL — SIGNIFICANT CHANGE UP (ref 8.4–10.5)
CHLORIDE SERPL-SCNC: 99 MMOL/L — SIGNIFICANT CHANGE UP (ref 96–108)
CO2 SERPL-SCNC: 25 MMOL/L — SIGNIFICANT CHANGE UP (ref 22–29)
CREAT SERPL-MCNC: 1.34 MG/DL — HIGH (ref 0.5–1.3)
EGFR: 56 ML/MIN/1.73M2 — LOW
EOSINOPHIL # BLD AUTO: 0.06 K/UL — SIGNIFICANT CHANGE UP (ref 0–0.5)
EOSINOPHIL NFR BLD AUTO: 1.2 % — SIGNIFICANT CHANGE UP (ref 0–6)
GLUCOSE SERPL-MCNC: 107 MG/DL — HIGH (ref 70–99)
HCT VFR BLD CALC: 31 % — LOW (ref 39–50)
HGB BLD-MCNC: 10.3 G/DL — LOW (ref 13–17)
IMM GRANULOCYTES NFR BLD AUTO: 2.3 % — HIGH (ref 0–0.9)
INR BLD: 1.02 RATIO — SIGNIFICANT CHANGE UP (ref 0.85–1.16)
LYMPHOCYTES # BLD AUTO: 0.63 K/UL — LOW (ref 1–3.3)
LYMPHOCYTES # BLD AUTO: 13 % — SIGNIFICANT CHANGE UP (ref 13–44)
MCHC RBC-ENTMCNC: 31.3 PG — SIGNIFICANT CHANGE UP (ref 27–34)
MCHC RBC-ENTMCNC: 33.2 G/DL — SIGNIFICANT CHANGE UP (ref 32–36)
MCV RBC AUTO: 94.2 FL — SIGNIFICANT CHANGE UP (ref 80–100)
MONOCYTES # BLD AUTO: 0.53 K/UL — SIGNIFICANT CHANGE UP (ref 0–0.9)
MONOCYTES NFR BLD AUTO: 10.9 % — SIGNIFICANT CHANGE UP (ref 2–14)
NEUTROPHILS # BLD AUTO: 3.49 K/UL — SIGNIFICANT CHANGE UP (ref 1.8–7.4)
NEUTROPHILS NFR BLD AUTO: 71.8 % — SIGNIFICANT CHANGE UP (ref 43–77)
NT-PROBNP SERPL-SCNC: 99 PG/ML — SIGNIFICANT CHANGE UP (ref 0–300)
PLATELET # BLD AUTO: 132 K/UL — LOW (ref 150–400)
POTASSIUM SERPL-MCNC: 3.8 MMOL/L — SIGNIFICANT CHANGE UP (ref 3.5–5.3)
POTASSIUM SERPL-SCNC: 3.8 MMOL/L — SIGNIFICANT CHANGE UP (ref 3.5–5.3)
PROT SERPL-MCNC: 6.6 G/DL — SIGNIFICANT CHANGE UP (ref 6.6–8.7)
PROTHROM AB SERPL-ACNC: 11.8 SEC — SIGNIFICANT CHANGE UP (ref 9.9–13.4)
RBC # BLD: 3.29 M/UL — LOW (ref 4.2–5.8)
RBC # FLD: 18 % — HIGH (ref 10.3–14.5)
SODIUM SERPL-SCNC: 137 MMOL/L — SIGNIFICANT CHANGE UP (ref 135–145)
TROPONIN T, HIGH SENSITIVITY RESULT: 13 NG/L — SIGNIFICANT CHANGE UP (ref 0–51)
TROPONIN T, HIGH SENSITIVITY RESULT: 14 NG/L — SIGNIFICANT CHANGE UP (ref 0–51)
WBC # BLD: 4.86 K/UL — SIGNIFICANT CHANGE UP (ref 3.8–10.5)
WBC # FLD AUTO: 4.86 K/UL — SIGNIFICANT CHANGE UP (ref 3.8–10.5)

## 2024-11-15 PROCEDURE — 71275 CT ANGIOGRAPHY CHEST: CPT | Mod: 26

## 2024-11-15 PROCEDURE — 71260 CT THORAX DX C+: CPT

## 2024-11-15 PROCEDURE — 99285 EMERGENCY DEPT VISIT HI MDM: CPT

## 2024-11-15 PROCEDURE — 71045 X-RAY EXAM CHEST 1 VIEW: CPT | Mod: 26

## 2024-11-15 PROCEDURE — 74177 CT ABD & PELVIS W/CONTRAST: CPT

## 2024-11-15 PROCEDURE — 99223 1ST HOSP IP/OBS HIGH 75: CPT

## 2024-11-15 PROCEDURE — 74177 CT ABD & PELVIS W/CONTRAST: CPT | Mod: 26

## 2024-11-15 PROCEDURE — 93010 ELECTROCARDIOGRAM REPORT: CPT

## 2024-11-15 PROCEDURE — 71260 CT THORAX DX C+: CPT | Mod: 26

## 2024-11-15 RX ORDER — ACETAMINOPHEN, DIPHENHYDRAMINE HCL, PHENYLEPHRINE HCL 325; 25; 5 MG/1; MG/1; MG/1
3 TABLET ORAL AT BEDTIME
Refills: 0 | Status: DISCONTINUED | OUTPATIENT
Start: 2024-11-15 | End: 2024-11-17

## 2024-11-15 RX ORDER — HEPARIN SODIUM,PORCINE 1000/ML
3500 VIAL (ML) INJECTION EVERY 6 HOURS
Refills: 0 | Status: DISCONTINUED | OUTPATIENT
Start: 2024-11-15 | End: 2024-11-17

## 2024-11-15 RX ORDER — ACETAMINOPHEN 500MG 500 MG/1
650 TABLET, COATED ORAL EVERY 6 HOURS
Refills: 0 | Status: DISCONTINUED | OUTPATIENT
Start: 2024-11-15 | End: 2024-11-17

## 2024-11-15 RX ORDER — HEPARIN SODIUM,PORCINE 1000/ML
7000 VIAL (ML) INJECTION ONCE
Refills: 0 | Status: COMPLETED | OUTPATIENT
Start: 2024-11-15 | End: 2024-11-15

## 2024-11-15 RX ORDER — MAGNESIUM, ALUMINUM HYDROXIDE 200-225/5
30 SUSPENSION, ORAL (FINAL DOSE FORM) ORAL EVERY 4 HOURS
Refills: 0 | Status: DISCONTINUED | OUTPATIENT
Start: 2024-11-15 | End: 2024-11-17

## 2024-11-15 RX ORDER — SODIUM CHLORIDE 9 MG/ML
1000 INJECTION, SOLUTION INTRAMUSCULAR; INTRAVENOUS; SUBCUTANEOUS ONCE
Refills: 0 | Status: COMPLETED | OUTPATIENT
Start: 2024-11-15 | End: 2024-11-15

## 2024-11-15 RX ORDER — LOSARTAN POTASSIUM 100 MG/1
100 TABLET, FILM COATED ORAL DAILY
Refills: 0 | Status: DISCONTINUED | OUTPATIENT
Start: 2024-11-15 | End: 2024-11-17

## 2024-11-15 RX ORDER — TAMSULOSIN HYDROCHLORIDE 0.4 MG/1
0.4 CAPSULE ORAL DAILY
Refills: 0 | Status: DISCONTINUED | OUTPATIENT
Start: 2024-11-15 | End: 2024-11-17

## 2024-11-15 RX ORDER — HEPARIN SODIUM,PORCINE 1000/ML
7000 VIAL (ML) INJECTION EVERY 6 HOURS
Refills: 0 | Status: DISCONTINUED | OUTPATIENT
Start: 2024-11-15 | End: 2024-11-17

## 2024-11-15 RX ORDER — HEPARIN SODIUM,PORCINE 1000/ML
VIAL (ML) INJECTION
Qty: 25000 | Refills: 0 | Status: DISCONTINUED | OUTPATIENT
Start: 2024-11-15 | End: 2024-11-17

## 2024-11-15 RX ORDER — GLUCOSAMINE SULFATE DIPOT CHLR 500 MG
1 CAPSULE ORAL DAILY
Refills: 0 | Status: DISCONTINUED | OUTPATIENT
Start: 2024-11-15 | End: 2024-11-17

## 2024-11-15 RX ORDER — OSIMERTINIB 40 1/1
80 TABLET, FILM COATED ORAL
Refills: 0 | DISCHARGE

## 2024-11-15 RX ORDER — ONDANSETRON HYDROCHLORIDE 4 MG/1
4 TABLET, FILM COATED ORAL EVERY 8 HOURS
Refills: 0 | Status: DISCONTINUED | OUTPATIENT
Start: 2024-11-15 | End: 2024-11-17

## 2024-11-15 RX ORDER — OSIMERTINIB 40 1/1
80 TABLET, FILM COATED ORAL DAILY
Refills: 0 | Status: DISCONTINUED | OUTPATIENT
Start: 2024-11-15 | End: 2024-11-17

## 2024-11-15 RX ADMIN — Medication 1600 UNIT(S)/HR: at 18:56

## 2024-11-15 RX ADMIN — Medication 1600 UNIT(S)/HR: at 23:25

## 2024-11-15 RX ADMIN — SODIUM CHLORIDE 1000 MILLILITER(S): 9 INJECTION, SOLUTION INTRAMUSCULAR; INTRAVENOUS; SUBCUTANEOUS at 20:41

## 2024-11-15 RX ADMIN — Medication 1600 UNIT(S)/HR: at 20:41

## 2024-11-15 RX ADMIN — Medication 7000 UNIT(S): at 19:25

## 2024-11-15 NOTE — ED ADULT TRIAGE NOTE - CHIEF COMPLAINT QUOTE
Pt states he had an outpatient CT scan at [facility], they called him after he had the scan to inform him that he had a PE, pt states he has lung cancer and is chronically short of breath, pt states he did have pain in his calves 2 weeks ago and the pain resolved, he attributed it to his chemo that he is on. Pt has been experiencing worsening dyspnea on exertion when walking up the stairs to go to bed.

## 2024-11-15 NOTE — ED ADULT NURSE REASSESSMENT NOTE - NS ED NURSE REASSESS COMMENT FT1
Assumed care of pt from AF RN at 1930. Pt is resting comfortably in stretcher. NAD. Pt is A&Ox4. Respirations are even and unlabored. Pt NSR on cardiac monitor. Pt admitted awaiting bed placement. Heparin drip infusing per orders. Plan on going.

## 2024-11-15 NOTE — H&P ADULT - ASSESSMENT
75yo M with PMHx of lung cancer on chemo s/p radiation therapy/ AUGIE Lingulectomy, HTN, HLD, Depression and CKD stage 3 presented to ED c/o SOB/BELL for the past 2 weeks.     R sided PE   -CTA a/p showed right pulmonary artery embolus without CT evidence of right heart strain.  -PE likely provoked in the setting of malignancy, pt does report family hx of VTE   -Pt hemothymically stable   -Dedicated CTA chest ordered by ED, pending   -Cont with heparin infusion   -check TTE, B/L LE doppler   -Heme/onc consulted     Lung CA s/p Chemo/RT   -Pt currently on oral chemo   -Osimertinib nonformulary here but pt can use own medication   -CT a/p showed multiple new indistinct nodules within left lower lobe  -pt f/u with Dr. Moore outpatient   -Oncology consult as above     HTN  -Pt reports that his bp not controlled well   -BP currently 160/83   -will cont home Losartan 100mg, HCTZ 12.5mg for now   -Pt also intermittently takes nebivolol   -monitor BP closely     CKD  -Cr close to baseline   -monitor closely in the setting of IV contrast use   -IV hydration   -renally dose medication     HLD  -cont Atorvastatin 20mg po daily     BPH  -cont Flomax     DVT ppx  -Heparin infusion   Dispo: active, anticipated dc 2-3days       Time-based billing (NON-critical care).     78minutes spent on total encounter. The necessity of the time spent during the encounter on this date of service was due to:   chart review, patient evaluation, independent history taking, documentation, coordination of care and discussion with patient, nurse and pt's wife at bedside.

## 2024-11-15 NOTE — ED PROVIDER NOTE - PROGRESS NOTE DETAILS
heparin started. Pt is stable, no signs of volume overload, lungs clear, no pitting edema. given 1L ns for mildly elevated cr from baseline, cta chest pending. admitted to tele.

## 2024-11-15 NOTE — ED ADULT NURSE NOTE - OBJECTIVE STATEMENT
PST A&Ox4 presenting to the ED c/o shortness of breath. PMH lung cancer (on chemo), Pt denies chest pain, fever, chills. Pt states "my dr told me to come here because they said my ct showed a PE ands they told me to come to the ER." Pt respirations are even and nonlabored and pt is NAD. Pt remains on contionus cardiac monitoring NSR HR 72,  97% RA. Bed locked and in lowest position with wife at bedside.

## 2024-11-15 NOTE — ED PROVIDER NOTE - CLINICAL SUMMARY MEDICAL DECISION MAKING FREE TEXT BOX
Pt is a 73 yo male with PMH of lung cancer on chemo s/p radiation therapy presenting with SOB. Pt has been having worsening SOB, worse with exertion for 3 weeks. Pt reports that he got a ct abdomen pelvis and chest with IV contrast today that showed right sided PE without right heart strain. Pt also reports of bilateral calf pain 3 weeks ago that went away 2 weeks ago. Pt denies chest pain, N/V/D, abdominal pain.    vs stable, exam WNL.    cta chest with iv contrast. cbc, cmp, trop, coags, bnp, heparin started for PE study. Pt is a 73 yo male with PMH of lung cancer on chemo s/p radiation therapy presenting with SOB. Pt has been having worsening SOB, worse with exertion for 3 weeks. Pt reports that he got a ct abdomen pelvis and chest with IV contrast today that showed right sided PE without right heart strain. Pt also reports of bilateral calf pain 3 weeks ago that went away 2 weeks ago. Pt denies chest pain, N/V/D, abdominal pain.    vs stable, exam WNL.    cta chest with iv contrast. cbc, cmp, trop, coags, bnp, heparin started for PE study. admitted for TTE study to further evaluate for right heart strain. Pt is a 75 yo male with PMH of lung cancer on chemo s/p radiation therapy presenting with SOB. Pt has been having worsening SOB, worse with exertion for 3 weeks. Pt reports that he got a ct abdomen pelvis and chest with IV contrast today that showed right sided PE without right heart strain. Pt also reports of bilateral calf pain 3 weeks ago that went away 2 weeks ago. Pt denies chest pain, N/V/D, abdominal pain.    vs stable, exam WNL.    cta chest with iv contrast. cbc, cmp, trop, coags, bnp, heparin started for PE study. admitted for TTE study to further evaluate for right heart strain.    ---------  Ngoc Torres MD, Attending  75 yo male with PMH of lung cancer on chemo s/p radiation therapy sent in for PE ct scan. PT was getting a routine post chemo CT and was informed that he has a PE. Pt endorse several weeks of increased sob with exertion and b/l leg pain that he attributed to his chemo. Pt without active complaints at rest in the ED.  Gen: Well appearing in NAD   Head: NC/AT  Neck: trachea midline  Resp:  No distress, clear to aus bl, no wheeze, rhonchi, rales.   Ext: no deformities, non tender calf b/l, no edema.   Neuro:  A&O appears non focal  Skin:  Warm and dry as visualized  Psych:  Normal affect and mood    ddx includes, but is not limited to the following: PE, right heart strain, possible DVT.   plan: screening labs, start heparin +/- CTA and US Admission.

## 2024-11-15 NOTE — ED ADULT NURSE NOTE - NSFALLUNIVINTERV_ED_ALL_ED
Bed/Stretcher in lowest position, wheels locked, appropriate side rails in place/Call bell, personal items and telephone in reach/Instruct patient to call for assistance before getting out of bed/chair/stretcher/Non-slip footwear applied when patient is off stretcher/[place] to call system/Physically safe environment - no spills, clutter or unnecessary equipment/Purposeful proactive rounding/Room/bathroom lighting operational, light cord in reach

## 2024-11-15 NOTE — H&P ADULT - NSICDXFAMILYHX_GEN_ALL_CORE_FT
FAMILY HISTORY:  FH: prostate cancer    Father  Still living? Unknown  Family history of pulmonary embolism, Age at diagnosis: Age Unknown    Sibling  Still living? Unknown  Family history of basal cell carcinoma, Age at diagnosis: Age Unknown  Family history of pulmonary embolism, Age at diagnosis: Age Unknown  FHx: lymphoma, Age at diagnosis: Age Unknown

## 2024-11-15 NOTE — ED PROVIDER NOTE - OBJECTIVE STATEMENT
Pt is a 73 yo male with PMH of lung cancer on chemo s/p radiation therapy presenting with SOB. Pt has been having worsening SOB for 3 weeks. Pt reports that he got a ct abdomen pelvis and chest with IV contrast today that showed right sided PE without right heart strain. Pt also reports of bilateral calf pain 3 weeks ago that went away 2 weeks ago. Pt denies chest pain, N/V/D, abdominal pain. Pt is a 73 yo male with PMH of lung cancer on chemo s/p radiation therapy presenting with SOB. Pt has been having worsening SOB for 3 weeks. Pt reports that he got a routine ct abdomen pelvis and chest with IV contrast today that showed right sided PE without right heart strain. Pt also reports of bilateral calf pain 3 weeks ago that went away 2 weeks ago. Pt denies chest pain, N/V/D, abdominal pain.

## 2024-11-15 NOTE — ED PROVIDER NOTE - NS_EDPROVIDERDISPOUSERTYPE_ED_A_ED
Bladder non-tender and non-distended. Urine clear yellow. Attending Attestation (For Attendings USE Only)...

## 2024-11-15 NOTE — ED ADULT NURSE NOTE - CHIEF COMPLAINT QUOTE
Pt states he had an outpatient CT scan at [facility], they called him after he had the scan to inform him that he had a PE, pt states he has lung cancer and is chronically short of breath, pt states he did have pain in his calves 2 weeks ago and the pain resolved, he attributed it to his chemo that he is on. Pt has been experiencing worsening dyspnea on exertion when walking up the stairs to go to bed.

## 2024-11-15 NOTE — ED PROVIDER NOTE - ATTENDING CONTRIBUTION TO CARE
Dr. Torres: I personally saw the patient with the resident and performed a substantive portion of the visit. I performed a face to face bedside interview with patient regarding history of present illness, review of symptoms and past medical history. I completed an independent physical exam and all aspects of medical decision making. I have discussed patient's plan of care with resident. I agree with note as stated above, having amended the EMR as needed to reflect my findings. This includes HISTORY OF PRESENT ILLNESS, HIV, PAST MEDICAL/SURGICAL/FAMILY/SOCIAL HISTORY, ALLERGIES AND HOME MEDICATIONS, ROS, PHYSICAL EXAM, MEDICAL DECISION MAKING and any PROGRESS NOTES during the time I functioned as the attending physician for this patient.  SEE MDM

## 2024-11-15 NOTE — ED ADULT NURSE NOTE - NSFALLRISK_ED_ALL_ED
No [Dear  ___] : Dear  [unfilled], [Consult Letter:] : I had the pleasure of evaluating your patient, [unfilled]. [Please see my note below.] : Please see my note below. [Consult Closing:] : Thank you very much for allowing me to participate in the care of this patient.  If you have any questions, please do not hesitate to contact me. [Sincerely,] : Sincerely, [FreeTextEntry3] : Toan Milian MD\par Division of Pediatric Orthopaedics and Rehabilitation\par [facility]\par [address]\[place] [address]\par [phone]\par fax: [phone]\par

## 2024-11-15 NOTE — H&P ADULT - HISTORY OF PRESENT ILLNESS
73yo M with PMHx of lung cancer on chemo s/p radiation therapy/ AUGIE Lingulectomy, HTN, HLD, Depression and CKD stage 3 presented to ED c/o SOB/BELL for the past 2 weeks. Pt reports to worsening dyspnea on exertion when walking up the stairs which unusual from his baseline sob. He also reports to calves pain 2 weeks ago which has resolved since. He got CT a/p as outpatient today and was told that he has PE to come to ED for evaluation. Denies cp, palpitations, syncope, lightheadedness, recent prolonged plan or care ride, fever, chills.  75yo M with PMHx of lung cancer oral chemo s/p 27 radiation therapy, 3 round of chemo/ AUGIE Lingulectomy, HTN, HLD, CKD stage 3 presented to ED c/o SOB/BELL for the past 2 weeks. Pt reports to worsening dyspnea on exertion when walking up the stairs which unusual from his baseline sob. He also reports to calves pain 2 weeks ago which has resolved since. He got CT a/p as outpatient today and was told that he has PE to come to ED for evaluation. Denies cp, palpitations, syncope, lightheadedness, recent prolonged plan or care ride, fever, chills.

## 2024-11-16 ENCOUNTER — TRANSCRIPTION ENCOUNTER (OUTPATIENT)
Age: 74
End: 2024-11-16

## 2024-11-16 ENCOUNTER — RESULT REVIEW (OUTPATIENT)
Age: 74
End: 2024-11-16

## 2024-11-16 LAB
ANION GAP SERPL CALC-SCNC: 11 MMOL/L — SIGNIFICANT CHANGE UP (ref 5–17)
APTT BLD: 118.1 SEC — HIGH (ref 24.5–35.6)
APTT BLD: >200 SEC — CRITICAL HIGH (ref 24.5–35.6)
BUN SERPL-MCNC: 12.8 MG/DL — SIGNIFICANT CHANGE UP (ref 8–20)
CALCIUM SERPL-MCNC: 9.5 MG/DL — SIGNIFICANT CHANGE UP (ref 8.4–10.5)
CHLORIDE SERPL-SCNC: 103 MMOL/L — SIGNIFICANT CHANGE UP (ref 96–108)
CO2 SERPL-SCNC: 25 MMOL/L — SIGNIFICANT CHANGE UP (ref 22–29)
CREAT SERPL-MCNC: 1.27 MG/DL — SIGNIFICANT CHANGE UP (ref 0.5–1.3)
EGFR: 60 ML/MIN/1.73M2 — SIGNIFICANT CHANGE UP
GLUCOSE SERPL-MCNC: 99 MG/DL — SIGNIFICANT CHANGE UP (ref 70–99)
HCT VFR BLD CALC: 29.4 % — LOW (ref 39–50)
HGB BLD-MCNC: 9.7 G/DL — LOW (ref 13–17)
MCHC RBC-ENTMCNC: 30.9 PG — SIGNIFICANT CHANGE UP (ref 27–34)
MCHC RBC-ENTMCNC: 33 G/DL — SIGNIFICANT CHANGE UP (ref 32–36)
MCV RBC AUTO: 93.6 FL — SIGNIFICANT CHANGE UP (ref 80–100)
PLATELET # BLD AUTO: 121 K/UL — LOW (ref 150–400)
POTASSIUM SERPL-MCNC: 3.9 MMOL/L — SIGNIFICANT CHANGE UP (ref 3.5–5.3)
POTASSIUM SERPL-SCNC: 3.9 MMOL/L — SIGNIFICANT CHANGE UP (ref 3.5–5.3)
RBC # BLD: 3.14 M/UL — LOW (ref 4.2–5.8)
RBC # FLD: 17.9 % — HIGH (ref 10.3–14.5)
SODIUM SERPL-SCNC: 139 MMOL/L — SIGNIFICANT CHANGE UP (ref 135–145)
WBC # BLD: 4.74 K/UL — SIGNIFICANT CHANGE UP (ref 3.8–10.5)
WBC # FLD AUTO: 4.74 K/UL — SIGNIFICANT CHANGE UP (ref 3.8–10.5)

## 2024-11-16 PROCEDURE — 99233 SBSQ HOSP IP/OBS HIGH 50: CPT

## 2024-11-16 PROCEDURE — 99223 1ST HOSP IP/OBS HIGH 75: CPT

## 2024-11-16 PROCEDURE — 93306 TTE W/DOPPLER COMPLETE: CPT | Mod: 26

## 2024-11-16 PROCEDURE — 93970 EXTREMITY STUDY: CPT | Mod: 26

## 2024-11-16 RX ORDER — APIXABAN 2.5 MG/1
10 TABLET, FILM COATED ORAL EVERY 12 HOURS
Refills: 0 | Status: DISCONTINUED | OUTPATIENT
Start: 2024-11-16 | End: 2024-11-17

## 2024-11-16 RX ORDER — APIXABAN 2.5 MG/1
1 TABLET, FILM COATED ORAL
Qty: 74 | Refills: 0
Start: 2024-11-16 | End: 2024-12-15

## 2024-11-16 RX ADMIN — Medication 0 UNIT(S)/HR: at 02:18

## 2024-11-16 RX ADMIN — LOSARTAN POTASSIUM 100 MILLIGRAM(S): 100 TABLET, FILM COATED ORAL at 05:41

## 2024-11-16 RX ADMIN — Medication 20 MILLIGRAM(S): at 10:31

## 2024-11-16 RX ADMIN — OSIMERTINIB 80 MILLIGRAM(S): 40 TABLET, FILM COATED ORAL at 10:30

## 2024-11-16 RX ADMIN — APIXABAN 10 MILLIGRAM(S): 2.5 TABLET, FILM COATED ORAL at 18:00

## 2024-11-16 RX ADMIN — Medication 1100 UNIT(S)/HR: at 16:07

## 2024-11-16 RX ADMIN — Medication 1 MILLIGRAM(S): at 10:32

## 2024-11-16 RX ADMIN — Medication 1300 UNIT(S)/HR: at 03:15

## 2024-11-16 RX ADMIN — Medication 1300 UNIT(S)/HR: at 13:14

## 2024-11-16 RX ADMIN — TAMSULOSIN HYDROCHLORIDE 0.4 MILLIGRAM(S): 0.4 CAPSULE ORAL at 10:31

## 2024-11-16 NOTE — PROGRESS NOTE ADULT - SUBJECTIVE AND OBJECTIVE BOX
Hospitalist Daily Progress Note    Chief Complaint:  Patient is a 74y old  Male who presents with a chief complaint of Right sided PE (16 Nov 2024 13:05)      SUBJECTIVE / OVERNIGHT EVENTS:  Patient was seen and examined at bedside. Feels well.   Patient denies chest pain, SOB, abd pain, N/V, fever, chills, dysuria or any other complaints. All remainder ROS negative.     MEDICATIONS  (STANDING):  atorvastatin 20 milliGRAM(s) Oral daily  folic acid 1 milliGRAM(s) Oral daily  heparin  Infusion.  Unit(s)/Hr (16 mL/Hr) IV Continuous <Continuous>  hydrochlorothiazide 12.5 milliGRAM(s) Oral daily  losartan 100 milliGRAM(s) Oral daily  osimertinib 80 milliGRAM(s) Oral daily  tamsulosin 0.4 milliGRAM(s) Oral daily    MEDICATIONS  (PRN):  acetaminophen     Tablet .. 650 milliGRAM(s) Oral every 6 hours PRN Temp greater or equal to 38C (100.4F), Mild Pain (1 - 3)  aluminum hydroxide/magnesium hydroxide/simethicone Suspension 30 milliLiter(s) Oral every 4 hours PRN Dyspepsia  heparin   Injectable 7000 Unit(s) IV Push every 6 hours PRN For aPTT less than 40  heparin   Injectable 3500 Unit(s) IV Push every 6 hours PRN For aPTT between 40 - 57  melatonin 3 milliGRAM(s) Oral at bedtime PRN Insomnia  ondansetron Injectable 4 milliGRAM(s) IV Push every 8 hours PRN Nausea and/or Vomiting        I&O's Summary    15 Nov 2024 07:01  -  16 Nov 2024 07:00  --------------------------------------------------------  IN: 0 mL / OUT: 300 mL / NET: -300 mL        PHYSICAL EXAM:  Vital Signs Last 24 Hrs  T(C): 36.8 (16 Nov 2024 12:16), Max: 36.9 (16 Nov 2024 04:25)  T(F): 98.3 (16 Nov 2024 12:16), Max: 98.5 (16 Nov 2024 07:41)  HR: 69 (16 Nov 2024 12:16) (57 - 69)  BP: 144/78 (16 Nov 2024 12:16) (137/81 - 161/89)  BP(mean): --  RR: 18 (16 Nov 2024 12:16) (16 - 18)  SpO2: 96% (16 Nov 2024 12:16) (93% - 97%)    Parameters below as of 16 Nov 2024 12:16  Patient On (Oxygen Delivery Method): room air          Constitutional: NAD, Resting  ENT: Supple, No JVD  Lungs: CTA B/L, Non-labored breathing  Cardio: RRR, S1/S2, No murmur  Abdomen: Soft, Nontender, Nondistended; Bowel sounds present  Extremities: No calf tenderness, No pitting edema  Musculoskeletal:   No joint swelling  Psych: Calm, cooperative affect appropriate  Neuro: Awake and alert, oriented x 4  Skin: No rashes; no palpable lesions    LABS:                        9.7    4.74  )-----------( 121      ( 16 Nov 2024 04:12 )             29.4     11-16    139  |  103  |  12.8  ----------------------------<  99  3.9   |  25.0  |  1.27    Ca    9.5      16 Nov 2024 04:12    TPro  6.6  /  Alb  3.9  /  TBili  0.4  /  DBili  x   /  AST  38  /  ALT  35  /  AlkPhos  96  11-15    PT/INR - ( 15 Nov 2024 17:29 )   PT: 11.8 sec;   INR: 1.02 ratio         PTT - ( 16 Nov 2024 06:00 )  PTT:118.1 sec      Urinalysis Basic - ( 16 Nov 2024 04:12 )    Color: x / Appearance: x / SG: x / pH: x  Gluc: 99 mg/dL / Ketone: x  / Bili: x / Urobili: x   Blood: x / Protein: x / Nitrite: x   Leuk Esterase: x / RBC: x / WBC x   Sq Epi: x / Non Sq Epi: x / Bacteria: x        CAPILLARY BLOOD GLUCOSE            RADIOLOGY REVIEWED

## 2024-11-16 NOTE — PROGRESS NOTE ADULT - ASSESSMENT
73yo M with PMHx of lung cancer on chemo s/p radiation therapy/ AUGIE Lingulectomy, HTN, HLD, Depression and CKD stage 3 presented to ED c/o SOB/BELL for the past 2 weeks.     #R sided PE   #Right sided below knee DVT  CTA a/p showed right pulmonary artery embolus without CT evidence of right heart strain.  PE likely provoked in the setting of malignancy, pt does report family hx of VTE   Cont with heparin infusion   Duplex with DVT  TTE Pending - Expedited but still pending  Heme onc recs appreciated    Lung CA s/p Chemo/RT   Pt currently on oral chemo   Osimertinib nonformulary here but pt can use own medication   CT a/p showed multiple new indistinct nodules within left lower lobe  pt f/u with Dr. Moore outpatient   Oncology consult    #HTN  Pt reports that his bp not controlled well   BP currently 160/83   will cont home Losartan 100mg, HCTZ 12.5mg for now   Pt also intermittently takes nebivolol   monitor BP closely     #CKD  Cr close to baseline   monitor closely in the setting of IV contrast use   IV hydration   renally dose medication     #HLD  cont Atorvastatin 20mg po daily     #BPH  cont Flomax     DVT ppx  -Heparin infusion     Dispo: Pending TTE - Can then transition to a DOAC     Discussed with son at bedside

## 2024-11-16 NOTE — DISCHARGE NOTE PROVIDER - CARE PROVIDER_API CALL
Vianca Olguin)  [address] [address]  Phone: [phone]  Fax: [phone]  Follow Up Time: 1 week

## 2024-11-16 NOTE — PATIENT PROFILE ADULT - FALL HARM RISK - RISK INTERVENTIONS
Assistance OOB with selected safe patient handling equipment/Assistance with ambulation/Communicate Fall Risk and Risk Factors to all staff, patient, and family/Monitor gait and stability/Reinforce activity limits and safety measures with patient and family/Sit up slowly, dangle for a short time, stand at bedside before walking/Use of alarms - bed, chair and/or voice tab/Visual Cue: Yellow wristband/Bed in lowest position, wheels locked, appropriate side rails in place/Call bell, personal items and telephone in reach/Instruct patient to call for assistance before getting out of bed or chair/Non-slip footwear when patient is out of bed/[place] to call system/Physically safe environment - no spills, clutter or unnecessary equipment/Purposeful Proactive Rounding/Room/bathroom lighting operational, light cord in reach

## 2024-11-16 NOTE — DISCHARGE NOTE PROVIDER - NSDCMRMEDTOKEN_GEN_ALL_CORE_FT
acetaminophen 325 mg oral tablet: 2 tab(s) orally every 6 hours as needed for  mild pain  atorvastatin 20 mg oral tablet: 1 tab(s) orally once a day (in the morning)  Eliquis Starter Pack for Treatment of DVT and PE 5 mg oral tablet: 1 tab(s) orally 2 times a day please do 2 tabs BID x 7 days  then do 1 tab BID  folic acid 1 mg oral tablet: 1 tab(s) orally once a day  losartan-hydroCHLOROthiazide 100 mg-12.5 mg oral tablet: 1 tab(s) orally once a day (in the morning)  Nebivolol 10 mg oral tablet: 0.5 tab(s) orally once a day (in the morning)  osimertinib 80 mg oral tablet: 80 milligram(s) orally once a day  tamsulosin 0.4 mg oral capsule: 1 cap(s) orally once a day  Vascepa 1 g oral capsule: 2 cap(s) orally once a day (in the morning)   acetaminophen 325 mg oral tablet: 2 tab(s) orally every 6 hours as needed for  mild pain  atorvastatin 20 mg oral tablet: 1 tab(s) orally once a day (in the morning)  Eliquis 5 mg oral tablet: 1 tab(s) orally 2 times a day Please take 2 tabs BID x 6 days followed by 1 tab BID  Eliquis Starter Pack for Treatment of DVT and PE 5 mg oral tablet: 1 tab(s) orally 2 times a day please do 2 tabs BID x 7 days  then do 1 tab BID  folic acid 1 mg oral tablet: 1 tab(s) orally once a day  losartan-hydroCHLOROthiazide 100 mg-12.5 mg oral tablet: 1 tab(s) orally once a day (in the morning)  Nebivolol 10 mg oral tablet: 0.5 tab(s) orally once a day (in the morning)  osimertinib 80 mg oral tablet: 80 milligram(s) orally once a day  tamsulosin 0.4 mg oral capsule: 1 cap(s) orally once a day  Vascepa 1 g oral capsule: 2 cap(s) orally once a day (in the morning)

## 2024-11-16 NOTE — DISCHARGE NOTE PROVIDER - NSDCFUSCHEDAPPT_GEN_ALL_CORE_FT
[facility]  [facility]  Scheduled Appointment: 11/18/2024    Vianca Olguin  [facility]  [facility]  Scheduled Appointment: 12/13/2024    Anant Rae  [facility]  [address] [facility] [address]  Scheduled Appointment: 01/02/2025    Damien Chi  [facility]  [address] [address]  Scheduled Appointment: 01/15/2025

## 2024-11-16 NOTE — CONSULT NOTE ADULT - ASSESSMENT
72 y/o male with adenocarcinoma of left lung diagnosed in May 2024 s/p AUGIE wedge resection for pT1N2  (  4 positive Lns , extensive LVI) PDL 1  1-2 %; [employer] showed EGFR [id number].  S/p adjuvant IMRT to left lung  completed 8/28/24  ( Dr Rae) .   S/p 3 out of 4 planned cycles of adjuvant chemotherapy with Pemetrexed and Carboplatin ( last chemo  10/17/24) Oncologist Dr Moore   Currently on adjuvant  osimertinib ( Tagrisso 80 mg daily).    Admitted with increased SOB. CTA showed acute Right sided pulmonary embolism , no CT evidence of  right heart strain.  Bilat LE Doppler showed right below knee DVT.    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 Family hx of VTE in  Started on iv Heparin  If echo  negative for right heart strain- and patient stable- can transition to therapeutic dose DOAC ( with loading dose).  Duration of AC- probably indefinite - minimum 6 months and next extended as long treated for cancer / or if evidence of active malignancy.   Can continue Tagrisso while inpatient.           74 y/o male with adenocarcinoma of left lung diagnosed in May 2024 s/p AUGIE wedge resection for pT1N2  (  4 positive Lns , extensive LVI) PDL 1  1-2 %; [employer] showed EGFR [id number].  S/p adjuvant IMRT to left lung  completed 8/28/24  ( Dr Rae) .   S/p 3 out of 4 planned cycles of adjuvant chemotherapy with Pemetrexed and Carboplatin ( last chemo  10/17/24) Oncologist Dr Moore   Currently on adjuvant  osimertinib ( Tagrisso 80 mg daily).    Admitted with increased SOB. CTA showed acute Right sided pulmonary embolism , no CT evidence of  right heart strain.  Bilat LE Doppler showed right below knee DVT.    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 Family hx of VTE in  Started on iv Heparin  If echo  negative for right heart strain- and patient stable- can transition to therapeutic dose DOAC ( with loading dose).  Duration of AC- probably indefinite - minimum 6 months and next extended as long treated for cancer / or if evidence of active malignancy.  No need for thrombophilia w/up at present ( will not )Can continue Tagrisso while inpatient.

## 2024-11-16 NOTE — CONSULT NOTE ADULT - SUBJECTIVE AND OBJECTIVE BOX
HPI:  73yo M with PMHx of lung cancer oral chemo s/p 27 radiation therapy, 3 round of chemo/ AUGIE Lingulectomy, HTN, HLD, CKD stage 3 presented to ED c/o SOB/BELL for the past 2 weeks. Pt reports to worsening dyspnea on exertion when walking up the stairs which unusual from his baseline sob. He also reports to calves pain 2 weeks ago which has resolved since. He got CT a/p as outpatient today and was told that he has PE to come to ED for evaluation. Denies cp, palpitations, syncope, lightheadedness, recent prolonged plan or care ride, fever, chills.  (15 Nov 2024 19:36)  CTA- showed R pulmonary artery embolus , no CT evidence of R heart strain.  He was started on IV heparin.      PAST MEDICAL & SURGICAL HISTORY:  HTN (hypertension)  Hyperlipidemia   Stage 3 chronic kidney disease  Renal artery stenosis  H/O aortic aneurysm  Basal cell carcinoma  Anxiety and depression  Renal calculi  History of BPH  Hx of AUGIE wedge resection   H/O basal cell carcinoma excision  S/P rotator cuff surgery  Bronchoscopy    Social History: remote former smoker  Drinks be      FAMILY HISTORY:  Family history of pulmonary embolism (Father, Sibling)  FH: prostate cancer  FHx: lymphoma (Sibling)  Family history of basal cell carcinoma (Sibling)      ROS :          MEDICATIONS  (STANDING):  atorvastatin 20 milliGRAM(s) Oral daily  folic acid 1 milliGRAM(s) Oral daily  heparin  Infusion.  Unit(s)/Hr (16 mL/Hr) IV Continuous <Continuous>  hydrochlorothiazide 12.5 milliGRAM(s) Oral daily  losartan 100 milliGRAM(s) Oral daily  osimertinib 80 milliGRAM(s) Oral daily  tamsulosin 0.4 milliGRAM(s) Oral daily    MEDICATIONS  (PRN):  acetaminophen     Tablet .. 650 milliGRAM(s) Oral every 6 hours PRN Temp greater or equal to 38C (100.4F), Mild Pain (1 - 3)  aluminum hydroxide/magnesium hydroxide/simethicone Suspension 30 milliLiter(s) Oral every 4 hours PRN Dyspepsia  heparin   Injectable 7000 Unit(s) IV Push every 6 hours PRN For aPTT less than 40  heparin   Injectable 3500 Unit(s) IV Push every 6 hours PRN For aPTT between 40 - 57  melatonin 3 milliGRAM(s) Oral at bedtime PRN Insomnia  ondansetron Injectable 4 milliGRAM(s) IV Push every 8 hours PRN Nausea and/or Vomiting      Vital Signs Last 24 Hrs  T(C): 36.8 (16 Nov 2024 12:16), Max: 36.9 (16 Nov 2024 04:25)  T(F): 98.3 (16 Nov 2024 12:16), Max: 98.5 (16 Nov 2024 07:41)  HR: 69 (16 Nov 2024 12:16) (57 - 69)  BP: 144/78 (16 Nov 2024 12:16) (137/81 - 161/89)  BP(mean): --  RR: 18 (16 Nov 2024 12:16) (16 - 18)  SpO2: 96% (16 Nov 2024 12:16) (93% - 97%)    Parameters below as of 16 Nov 2024 12:16  Patient On (Oxygen Delivery Method): room air                                9.7    4.74  )-----------( 121      ( 16 Nov 2024 04:12 )             29.4     11-16    139  |  103  |  12.8  ----------------------------<  99  3.9   |  25.0  |  1.27    Ca    9.5      16 Nov 2024 04:12    TPro  6.6  /  Alb  3.9  /  TBili  0.4  /  DBili  x   /  AST  38  /  ALT  35  /  AlkPhos  96  11-15    ACC: [id number] EXAM:  CT ANGIO CHEST [facility]   ORDERED BY: BINU JOY     PROCEDURE DATE:  11/15/2024          INTERPRETATION:  Clinical information: Pulmonary embolus. Exam is   compared to study performed earlier on the same day.    CTangiogram of the chest was obtained following administration of   intravenous contrast. Approximately 90 cc of Omnipaque 350 was   administered and 10 cc was discarded. Coronal, sagittal and MIP images   were submitted for review.    Few small lymph nodes are present in the mediastinum.    Heart is normal in size. Calcification of the coronary arteries is noted.   No pericardial effusion is noted. Pulmonary arteries are normal in   caliber. Filling defects are noted in the distal right main pulmonary   artery extending into the segmental branches of the right upper, right   middle and right lower lobe pulmonary artery branches. Appearance is   unchanged when compared to exam performed earlier on the same day.    No endobronchial lesions are noted. Patient is status post wedge   resection in the left upper lobe. Few ill-defined nodules are noted in   the left lower lobe. Compressive atelectasis is noted involving portion   of the left lower lobe. Small left pleural effusion is noted.    Below the diaphragm, visualized portions of the abdomen are unremarkable.    Degenerative changes of the spine are noted.    IMPRESSION: No significant change in the pulmonary emboli noted in the   distal right main pulmonary artery extending into the segmental branches   of the right upper, right middle and right lower lobe pulmonary artery   branches when compared to exam performed earlier on the same day. No   right heart strain is noted.    --- End of Report ---  SYLVIA TONG MD; Attending Radiologist  This document has been electronically signed. Nov 16 2024  9:09AM      Bilat LE Doppler 11/15/24  RIGHT:  Normal compressibility of the RIGHT common femoral, femoral and popliteal   veins.  Doppler examination shows normal spontaneous and phasic flow in the   nonoccluded segments.  Thrombus in the right posterior tibial vein, soleal vein, and peroneal   vein which show lack of compression and poor color Doppler flow.    LEFT:  Normal compressibility of the LEFT common femoral, femoral and popliteal   veins.  Doppler examination shows normal spontaneous and phasic flow.  No LEFT calf vein thrombosis is detected.    IMPRESSION:  Acute deep venous thrombosis: below the knee.       HPI:  73yo M with PMHx of lung cancer oral chemo s/p 27 radiation therapy, 3 round of chemo/ AUGIE Lingulectomy, HTN, HLD, CKD stage 3 presented to ED c/o SOB/BELL for the past 2 weeks. Pt reports to worsening dyspnea on exertion when walking up the stairs which unusual from his baseline sob. He also reports to calves pain 2 weeks ago which has resolved since. He got CT a/p as outpatient today and was told that he has PE to come to ED for evaluation. Denies cp, palpitations, syncope, lightheadedness, recent prolonged plan or care ride, fever, chills.  (15 Nov 2024 19:36)  CTA- showed R pulmonary artery embolus , no CT evidence of R heart strain.  He was started on IV heparin.      PAST MEDICAL & SURGICAL HISTORY:  HTN (hypertension)  Hyperlipidemia   Stage 3 chronic kidney disease  Renal artery stenosis  H/O aortic aneurysm  Basal cell carcinoma  Anxiety and depression  Renal calculi  History of BPH  Hx of AUGIE wedge resection   H/O basal cell carcinoma excision  S/P rotator cuff surgery  Bronchoscopy    Social History: remote former smoker  Drinks be      FAMILY HISTORY:  Family history of pulmonary embolism (Father, brother  FH: prostate cancer  FHx: lymphoma (Sibling)  Family history of basal cell carcinoma (Sibling)      ROS : as above. All other systems negative. overall he has been feeling better since he stopped chemotherapy. Outpatient CT scans were done as a routine follow up         MEDICATIONS  (STANDING):  atorvastatin 20 milliGRAM(s) Oral daily  folic acid 1 milliGRAM(s) Oral daily  heparin  Infusion.  Unit(s)/Hr (16 mL/Hr) IV Continuous <Continuous>  hydrochlorothiazide 12.5 milliGRAM(s) Oral daily  losartan 100 milliGRAM(s) Oral daily  osimertinib 80 milliGRAM(s) Oral daily  tamsulosin 0.4 milliGRAM(s) Oral daily    MEDICATIONS  (PRN):  acetaminophen     Tablet .. 650 milliGRAM(s) Oral every 6 hours PRN Temp greater or equal to 38C (100.4F), Mild Pain (1 - 3)  aluminum hydroxide/magnesium hydroxide/simethicone Suspension 30 milliLiter(s) Oral every 4 hours PRN Dyspepsia  heparin   Injectable 7000 Unit(s) IV Push every 6 hours PRN For aPTT less than 40  heparin   Injectable 3500 Unit(s) IV Push every 6 hours PRN For aPTT between 40 - 57  melatonin 3 milliGRAM(s) Oral at bedtime PRN Insomnia  ondansetron Injectable 4 milliGRAM(s) IV Push every 8 hours PRN Nausea and/or Vomiting      Vital Signs Last 24 Hrs  T(C): 36.8 (16 Nov 2024 12:16), Max: 36.9 (16 Nov 2024 04:25)  T(F): 98.3 (16 Nov 2024 12:16), Max: 98.5 (16 Nov 2024 07:41)  HR: 69 (16 Nov 2024 12:16) (57 - 69)  BP: 144/78 (16 Nov 2024 12:16) (137/81 - 161/89)  BP(mean): --  RR: 18 (16 Nov 2024 12:16) (16 - 18)  SpO2: 96% (16 Nov 2024 12:16) (93% - 97%)    Parameters below as of 16 Nov 2024 12:16  Patient On (Oxygen Delivery Method): room air                                9.7    4.74  )-----------( 121      ( 16 Nov 2024 04:12 )             29.4     11-16    139  |  103  |  12.8  ----------------------------<  99  3.9   |  25.0  |  1.27    Ca    9.5      16 Nov 2024 04:12    TPro  6.6  /  Alb  3.9  /  TBili  0.4  /  DBili  x   /  AST  38  /  ALT  35  /  AlkPhos  96  11-15    ACC: [id number] [facility]  [facility]   ORDERED BY: BINU JOY     PROCEDURE DATE:  11/15/2024          INTERPRETATION:  Clinical information: Pulmonary embolus. Exam is   compared to study performed earlier on the same day.    CTangiogram of the chest was obtained following administration of   intravenous contrast. Approximately 90 cc of Omnipaque 350 was   administered and 10 cc was discarded. Coronal, sagittal and MIP images   were submitted for review.    Few small lymph nodes are present in the mediastinum.    Heart is normal in size. Calcification of the coronary arteries is noted.   No pericardial effusion is noted. Pulmonary arteries are normal in   caliber. Filling defects are noted in the distal right main pulmonary   artery extending into the segmental branches of the right upper, right   middle and right lower lobe pulmonary artery branches. Appearance is   unchanged when compared to exam performed earlier on the same day.    No endobronchial lesions are noted. Patient is status post wedge   resection in the left upper lobe. Few ill-defined nodules are noted in   the left lower lobe. Compressive atelectasis is noted involving portion   of the left lower lobe. Small left pleural effusion is noted.    Below the diaphragm, visualized portions of the abdomen are unremarkable.    Degenerative changes of the spine are noted.    IMPRESSION: No significant change in the pulmonary emboli noted in the   distal right main pulmonary artery extending into the segmental branches   of the right upper, right middle and right lower lobe pulmonary artery   branches when compared to exam performed earlier on the same day. No   right heart strain is noted.    --- End of Report ---  SYLVIA TONG MD; Attending Radiologist  This document has been electronically signed. Nov 16 2024  9:09AM      Bilat LE Doppler 11/15/24  RIGHT:  Normal compressibility of the RIGHT common femoral, femoral and popliteal   veins.  Doppler examination shows normal spontaneous and phasic flow in the   nonoccluded segments.  Thrombus in the right posterior tibial vein, soleal vein, and peroneal   vein which show lack of compression and poor color Doppler flow.    LEFT:  Normal compressibility of the LEFT common femoral, femoral and popliteal   veins.  Doppler examination shows normal spontaneous and phasic flow.  No LEFT calf vein thrombosis is detected.    IMPRESSION:  Acute deep venous thrombosis: below the knee.       HPI:  75yo M with PMHx of lung cancer oral chemo s/p 27 radiation therapy, 3 round of chemo/ AUGIE Lingulectomy, HTN, HLD, CKD stage 3 presented to ED c/o SOB/BELL for the past 2 weeks. Pt reports to worsening dyspnea on exertion when walking up the stairs which unusual from his baseline sob. He also reports to calves pain 2 weeks ago which has resolved since. He got CT a/p as outpatient today and was told that he has PE to come to ED for evaluation. Denies cp, palpitations, syncope, lightheadedness, recent prolonged plan or care ride, fever, chills.  (15 Nov 2024 19:36)  CTA- showed R pulmonary artery embolus , no CT evidence of R heart strain.  He was started on IV heparin.      PAST MEDICAL & SURGICAL HISTORY:  HTN (hypertension)  Hyperlipidemia   Stage 3 chronic kidney disease  Renal artery stenosis  H/O aortic aneurysm  Basal cell carcinoma  Anxiety and depression  Renal calculi  History of BPH  Hx of AUGIE wedge resection   H/O basal cell carcinoma excision  S/P rotator cuff surgery  Bronchoscopy    Social History: remote former smoker  Drinks be      FAMILY HISTORY:  Family history of pulmonary embolism (Father, brother  FH: prostate cancer  FHx: lymphoma (Sibling)  Family history of basal cell carcinoma (Sibling)      ROS : as above. All other systems negative. overall he has been feeling better since he stopped chemotherapy. Outpatient CT scans were done as a routine follow up         MEDICATIONS  (STANDING):  atorvastatin 20 milliGRAM(s) Oral daily  folic acid 1 milliGRAM(s) Oral daily  heparin  Infusion.  Unit(s)/Hr (16 mL/Hr) IV Continuous <Continuous>  hydrochlorothiazide 12.5 milliGRAM(s) Oral daily  losartan 100 milliGRAM(s) Oral daily  osimertinib 80 milliGRAM(s) Oral daily  tamsulosin 0.4 milliGRAM(s) Oral daily    MEDICATIONS  (PRN):  acetaminophen     Tablet .. 650 milliGRAM(s) Oral every 6 hours PRN Temp greater or equal to 38C (100.4F), Mild Pain (1 - 3)  aluminum hydroxide/magnesium hydroxide/simethicone Suspension 30 milliLiter(s) Oral every 4 hours PRN Dyspepsia  heparin   Injectable 7000 Unit(s) IV Push every 6 hours PRN For aPTT less than 40  heparin   Injectable 3500 Unit(s) IV Push every 6 hours PRN For aPTT between 40 - 57  melatonin 3 milliGRAM(s) Oral at bedtime PRN Insomnia  ondansetron Injectable 4 milliGRAM(s) IV Push every 8 hours PRN Nausea and/or Vomiting      Vital Signs Last 24 Hrs  T(C): 36.8 (16 Nov 2024 12:16), Max: 36.9 (16 Nov 2024 04:25)  T(F): 98.3 (16 Nov 2024 12:16), Max: 98.5 (16 Nov 2024 07:41)  HR: 69 (16 Nov 2024 12:16) (57 - 69)  BP: 144/78 (16 Nov 2024 12:16) (137/81 - 161/89)  BP(mean): --  RR: 18 (16 Nov 2024 12:16) (16 - 18)  SpO2: 96% (16 Nov 2024 12:16) (93% - 97%)    Parameters below as of 16 Nov 2024 12:16  Patient On (Oxygen Delivery Method): room air      Exam : NAD   Lungs clear Non labored breathing  Heart RRR  Abd soft ND NT  Extr no calf tenderness no edema   neuro  non focal   Skin no rashes                         9.7    4.74  )-----------( 121      ( 16 Nov 2024 04:12 )             29.4     11-16    139  |  103  |  12.8  ----------------------------<  99  3.9   |  25.0  |  1.27    Ca    9.5      16 Nov 2024 04:12    TPro  6.6  /  Alb  3.9  /  TBili  0.4  /  DBili  x   /  AST  38  /  ALT  35  /  AlkPhos  96  11-15    ACC: [id number] EXAM:  CT ANGIO CHEST [facility]   ORDERED BY: BINU JOY     PROCEDURE DATE:  11/15/2024          INTERPRETATION:  Clinical information: Pulmonary embolus. Exam is   compared to study performed earlier on the same day.    CTangiogram of the chest was obtained following administration of   intravenous contrast. Approximately 90 cc of Omnipaque 350 was   administered and 10 cc was discarded. Coronal, sagittal and MIP images   were submitted for review.    Few small lymph nodes are present in the mediastinum.    Heart is normal in size. Calcification of the coronary arteries is noted.   No pericardial effusion is noted. Pulmonary arteries are normal in   caliber. Filling defects are noted in the distal right main pulmonary   artery extending into the segmental branches of the right upper, right   middle and right lower lobe pulmonary artery branches. Appearance is   unchanged when compared to exam performed earlier on the same day.    No endobronchial lesions are noted. Patient is status post wedge   resection in the left upper lobe. Few ill-defined nodules are noted in   the left lower lobe. Compressive atelectasis is noted involving portion   of the left lower lobe. Small left pleural effusion is noted.    Below the diaphragm, visualized portions of the abdomen are unremarkable.    Degenerative changes of the spine are noted.    IMPRESSION: No significant change in the pulmonary emboli noted in the   distal right main pulmonary artery extending into the segmental branches   of the right upper, right middle and right lower lobe pulmonary artery   branches when compared to exam performed earlier on the same day. No   right heart strain is noted.    --- End of Report ---  SYLVIA TONG MD; Attending Radiologist  This document has been electronically signed. Nov 16 2024  9:09AM      Bilat LE Doppler 11/15/24  RIGHT:  Normal compressibility of the RIGHT common femoral, femoral and popliteal   veins.  Doppler examination shows normal spontaneous and phasic flow in the   nonoccluded segments.  Thrombus in the right posterior tibial vein, soleal vein, and peroneal   vein which show lack of compression and poor color Doppler flow.    LEFT:  Normal compressibility of the LEFT common femoral, femoral and popliteal   veins.  Doppler examination shows normal spontaneous and phasic flow.  No LEFT calf vein thrombosis is detected.    IMPRESSION:  Acute deep venous thrombosis: below the knee.

## 2024-11-16 NOTE — DISCHARGE NOTE PROVIDER - HOSPITAL COURSE
73yo M with PMHx of lung cancer oral chemo s/p 27 radiation therapy, 3 round of chemo/ AUGIE Lingulectomy, HTN, HLD, CKD stage 3 presented to ED c/o SOB/BELL for the past 2 weeks. Pt reports to worsening dyspnea on exertion when walking up the stairs which unusual from his baseline sob. He also reports to calves pain 2 weeks ago which has resolved since. He got CT a/p as outpatient today and was told that he has PE to come to ED for evaluation. Denies cp, palpitations, syncope, lightheadedness, recent prolonged plan or care ride, fever, chills.  CTA Chest confirmed pE. US of RLE showed dVT. TTE without right heart strain. Medically stable for transition to DO[facility] and dC home.

## 2024-11-16 NOTE — DISCHARGE NOTE PROVIDER - ATTENDING DISCHARGE PHYSICAL EXAMINATION:
PHYSICAL EXAM:  Vital Signs Last 24 Hrs  T(F): 98.4 (16 Nov 2024 16:08), Max: 98.5 (16 Nov 2024 07:41)  HR: 65 (16 Nov 2024 16:08) (57 - 69)  BP: 149/81 (16 Nov 2024 16:08) (137/81 - 160/83)  RR: 19 (16 Nov 2024 16:08) (18 - 19)  SpO2: 92% (16 Nov 2024 16:08) (92% - 96%)    GENERAL: NAD, Resting in bed  Eyes: EOMI, PERRLA  ENMT: Conjunctiva and sclera clear; supple neck, No JVD  Cardiovascular: S1,S2, RRR, No murmur  Respiratory: CTA B/L, Non-labored breathing  GI: Bowel sounds present; Soft, Nontender, Nondistended. No hepatomegaly  Genitourinary: Deferred  Skin:  no breakdowns, ulcers or discharge, No rashes or lesions  Neurology: Alert & Oriented X3, non-focal and spontaneous movements of all extremities, CN 2 to 12 grossly intact   Psych: Appropriate mood and affect, calm, pleasant, Responds appropriately to questions   GENERAL: NAD, Resting in bed  Eyes: EOMI, PERRLA  ENMT: Conjunctiva and sclera clear; supple neck, No JVD  Cardiovascular: S1,S2, RRR, No murmur  Respiratory: CTA B/L, Non-labored breathing  GI: Bowel sounds present; Soft, Nontender, Nondistended. No hepatomegaly  Genitourinary: Deferred  Skin:  no breakdowns, ulcers or discharge, No rashes or lesions  Neurology: Alert & Oriented X3, non-focal and spontaneous movements of all extremities, CN 2 to 12 grossly intact   Psych: Appropriate mood and affect, calm, pleasant, Responds appropriately to questions

## 2024-11-16 NOTE — DISCHARGE NOTE PROVIDER - NSDCHC_MEDRECSTATUS_GEN_ALL_CORE
unintentional/loss of appetite Admission Reconciliation is Completed  Discharge Reconciliation is Completed

## 2024-11-16 NOTE — DISCHARGE NOTE PROVIDER - NSDCCPCAREPLAN_GEN_ALL_CORE_FT
PRINCIPAL DISCHARGE DIAGNOSIS  Diagnosis: Pulmonary embolism  Assessment and Plan of Treatment: And dVT of RLE  TTE was without right heart strain  Please take eliquis as prescribed.  please follow up with heme onc

## 2024-11-17 VITALS
OXYGEN SATURATION: 94 % | DIASTOLIC BLOOD PRESSURE: 88 MMHG | TEMPERATURE: 98 F | SYSTOLIC BLOOD PRESSURE: 156 MMHG | HEART RATE: 68 BPM | RESPIRATION RATE: 18 BRPM

## 2024-11-17 LAB
APTT BLD: 33.8 SEC — SIGNIFICANT CHANGE UP (ref 24.5–35.6)
APTT BLD: 38.2 SEC — HIGH (ref 24.5–35.6)
HCT VFR BLD CALC: 31 % — LOW (ref 39–50)
HGB BLD-MCNC: 10.4 G/DL — LOW (ref 13–17)
MCHC RBC-ENTMCNC: 31.3 PG — SIGNIFICANT CHANGE UP (ref 27–34)
MCHC RBC-ENTMCNC: 33.5 G/DL — SIGNIFICANT CHANGE UP (ref 32–36)
MCV RBC AUTO: 93.4 FL — SIGNIFICANT CHANGE UP (ref 80–100)
PLATELET # BLD AUTO: 118 K/UL — LOW (ref 150–400)
RBC # BLD: 3.32 M/UL — LOW (ref 4.2–5.8)
RBC # FLD: 17.7 % — HIGH (ref 10.3–14.5)
WBC # BLD: 4.54 K/UL — SIGNIFICANT CHANGE UP (ref 3.8–10.5)
WBC # FLD AUTO: 4.54 K/UL — SIGNIFICANT CHANGE UP (ref 3.8–10.5)

## 2024-11-17 PROCEDURE — 99239 HOSP IP/OBS DSCHRG MGMT >30: CPT

## 2024-11-17 RX ORDER — APIXABAN 2.5 MG/1
1 TABLET, FILM COATED ORAL
Qty: 72 | Refills: 0
Start: 2024-11-17 | End: 2024-12-16

## 2024-11-17 RX ADMIN — TAMSULOSIN HYDROCHLORIDE 0.4 MILLIGRAM(S): 0.4 CAPSULE ORAL at 11:37

## 2024-11-17 RX ADMIN — Medication 1 MILLIGRAM(S): at 11:37

## 2024-11-17 RX ADMIN — APIXABAN 10 MILLIGRAM(S): 2.5 TABLET, FILM COATED ORAL at 06:00

## 2024-11-17 RX ADMIN — Medication 20 MILLIGRAM(S): at 11:37

## 2024-11-17 RX ADMIN — LOSARTAN POTASSIUM 100 MILLIGRAM(S): 100 TABLET, FILM COATED ORAL at 05:59

## 2024-11-17 NOTE — DISCHARGE NOTE NURSING/CASE MANAGEMENT/SOCIAL WORK - FINANCIAL ASSISTANCE
[facility] provides services at a reduced cost to those who are determined to be eligible through [facility]’s financial assistance program. Information regarding [facility]’s financial assistance program can be found by going to [email or web address] or by calling [phone].

## 2024-11-17 NOTE — DISCHARGE NOTE NURSING/CASE MANAGEMENT/SOCIAL WORK - NSCORESITESY/N_GEN_A_CORE_RD
Kendrick Fitzpatrick MD       Current medications:    No current facility-administered medications for this encounter.         Allergies:  No Known Allergies    Problem List:    Patient Active Problem List   Diagnosis Code    ZOILA (obstructive sleep apnea) G47.33    PLMD (periodic limb movement disorder) G47.61    CPAP (continuous positive airway pressure) dependence Z99.89    Nausea R11.0    Diarrhea of presumed infectious origin A09    Dizziness R42    Acute gastroenteritis K52.9    Calculus of gallbladder without cholecystitis K80.20    Hiatal hernia K44.9    Calculus of gallbladder with biliary obstruction but without cholecystitis K80.21       Past Medical History:        Diagnosis Date    Allergic rhinitis     Calculus of gallbladder without cholecystitis 9/27/2017    CPAP (continuous positive airway pressure) dependence     12cm    Diabetes ([address]     Hiatal hernia 9/27/2017    Hypertension     Hypothyroidism     Murmur     Obesity     ZOILA (obstructive sleep apnea)     AHI:  28.5 per PSG, 9/2014    PLMD (periodic limb movement disorder)     Thyroid disease     Type II or unspecified type diabetes mellitus without mention of complication, not stated as uncontrolled        Past Surgical History:        Procedure Laterality Date    BREAST BIOPSY Right 2012    CARPAL TUNNEL RELEASE      bilateral    HYSTERECTOMY         Social History:    Social History   Substance Use Topics    Smoking status: Never Smoker    Smokeless tobacco: Never Used    Alcohol use No                                Counseling given: Not Answered      Vital Signs (Current):   Vitals:    10/31/17 0847   BP: 125/71   Pulse: (!) 49   Resp: 20   Temp: 97.6 °F (36.4 °C)   TempSrc: Temporal   SpO2: 97%   Weight: (!) 354 lb (160.6 kg)   Height: 5' 7\" (1.702 m)                                              BP Readings from Last 3 Encounters:   10/31/17 125/71   10/13/17 138/80   09/13/17 (!) 134/100       NPO Status: Time of last liquid consumption: 2130                        Time of last solid consumption: 2130                        Date of last liquid consumption: 10/30/17                        Date of last solid food consumption: 10/30/17    BMI:   Wt Readings from Last 3 Encounters:   10/31/17 (!) 354 lb (160.6 kg)   10/20/17 (!) 354 lb (160.6 kg)   10/13/17 (!) 354 lb 11.2 oz (160.9 kg)     Body mass index is 55.44 kg/m².     CBC:   Lab Results   Component Value Date    WBC 6.6 09/07/2017    RBC 5.00 09/07/2017    HGB 14.8 09/07/2017    HCT 45.6 09/07/2017    MCV 91.2 09/07/2017    RDW 13.7 09/07/2017     09/07/2017       CMP:   Lab Results   Component Value Date     09/07/2017    K 3.9 09/07/2017     09/07/2017    CO2 26 09/07/2017    BUN 13 09/07/2017    CREATININE 0.8 09/07/2017    LABGLOM >60 09/07/2017    GLUCOSE 112 09/07/2017    PROT 8.2 09/07/2017    CALCIUM 10.1 09/07/2017    BILITOT 0.6 09/07/2017    ALKPHOS 63 09/07/2017    AST 24 09/07/2017    ALT 28 09/07/2017       POC Tests:   Recent Labs      10/31/17   0842   POCGLU  149*       Coags: No results found for: PROTIME, INR, APTT    HCG (If Applicable): No results found for: PREGTESTUR, PREGSERUM, HCG, HCGQUANT     ABGs: No results found for: PHART, PO2ART, IXH0IPT, KPX1SEM, BEART, P3SABIIU     Type & Screen (If Applicable):  No results found for: LABABO, [address]    Anesthesia Evaluation  Patient summary reviewed and Nursing notes reviewed history of anesthetic complications:   Airway: Mallampati: II  TM distance: >3 FB   Neck ROM: full  Mouth opening: > = 3 FB Dental: normal exam         Pulmonary:normal exam  breath sounds clear to auscultation  (+) sleep apnea: on CPAP,                             Cardiovascular:    (+) hypertension:, valvular problems/murmurs:, murmur,         Rhythm: regular  Rate: normal                    Neuro/Psych:   (+) neuromuscular disease:,             GI/Hepatic/Renal:   (+) hiatal hernia, GERD:,           Endo/Other:    (+) Type No

## 2024-11-17 NOTE — DISCHARGE NOTE NURSING/CASE MANAGEMENT/SOCIAL WORK - PATIENT PORTAL LINK FT
You can access the FollowMyHealth Patient Portal offered by [facility] by registering at the following website: [email or web address]. By joining [facility]’s FollowMyHealth portal, you will also be able to view your health information using other applications (apps) compatible with our system.

## 2024-11-18 ENCOUNTER — RESULT REVIEW (OUTPATIENT)
Age: 74
End: 2024-11-18

## 2024-11-18 ENCOUNTER — APPOINTMENT (OUTPATIENT)
Dept: HEMATOLOGY ONCOLOGY | Facility: CLINIC | Age: 74
End: 2024-11-18

## 2024-11-18 LAB
BASOPHILS # BLD AUTO: 0.1 K/UL — SIGNIFICANT CHANGE UP (ref 0–0.2)
BASOPHILS NFR BLD AUTO: 1.2 % — SIGNIFICANT CHANGE UP (ref 0–2)
EOSINOPHIL # BLD AUTO: 0.1 K/UL — SIGNIFICANT CHANGE UP (ref 0–0.5)
EOSINOPHIL NFR BLD AUTO: 1.2 % — SIGNIFICANT CHANGE UP (ref 0–6)
HCT VFR BLD CALC: 33.1 % — LOW (ref 39–50)
HGB BLD-MCNC: 10.9 G/DL — LOW (ref 13–17)
LYMPHOCYTES # BLD AUTO: 0.8 K/UL — LOW (ref 1–3.3)
LYMPHOCYTES # BLD AUTO: 16.1 % — SIGNIFICANT CHANGE UP (ref 13–44)
MCHC RBC-ENTMCNC: 32.3 PG — SIGNIFICANT CHANGE UP (ref 27–34)
MCHC RBC-ENTMCNC: 32.9 G/DL — SIGNIFICANT CHANGE UP (ref 32–36)
MCV RBC AUTO: 98 FL — SIGNIFICANT CHANGE UP (ref 80–100)
MONOCYTES # BLD AUTO: 0.4 K/UL — SIGNIFICANT CHANGE UP (ref 0–0.9)
MONOCYTES NFR BLD AUTO: 8.5 % — SIGNIFICANT CHANGE UP (ref 2–14)
NEUTROPHILS # BLD AUTO: 3.4 K/UL — SIGNIFICANT CHANGE UP (ref 1.8–7.4)
NEUTROPHILS NFR BLD AUTO: 72.9 % — SIGNIFICANT CHANGE UP (ref 43–77)
PLATELET # BLD AUTO: 102 K/UL — LOW (ref 150–400)
RBC # BLD: 3.38 M/UL — LOW (ref 4.2–5.8)
RBC # FLD: 16.5 % — HIGH (ref 10.3–14.5)
WBC # BLD: 4.6 K/UL — SIGNIFICANT CHANGE UP (ref 3.8–10.5)
WBC # FLD AUTO: 4.6 K/UL — SIGNIFICANT CHANGE UP (ref 3.8–10.5)

## 2024-11-26 PROCEDURE — 85610 PROTHROMBIN TIME: CPT

## 2024-11-26 PROCEDURE — 83880 ASSAY OF NATRIURETIC PEPTIDE: CPT

## 2024-11-26 PROCEDURE — 93005 ELECTROCARDIOGRAM TRACING: CPT

## 2024-11-26 PROCEDURE — 36415 COLL VENOUS BLD VENIPUNCTURE: CPT

## 2024-11-26 PROCEDURE — 93970 EXTREMITY STUDY: CPT

## 2024-11-26 PROCEDURE — 71045 X-RAY EXAM CHEST 1 VIEW: CPT

## 2024-11-26 PROCEDURE — 84484 ASSAY OF TROPONIN QUANT: CPT

## 2024-11-26 PROCEDURE — 71275 CT ANGIOGRAPHY CHEST: CPT | Mod: MC

## 2024-11-26 PROCEDURE — 99285 EMERGENCY DEPT VISIT HI MDM: CPT | Mod: 25

## 2024-11-26 PROCEDURE — 80053 COMPREHEN METABOLIC PANEL: CPT

## 2024-11-26 PROCEDURE — 80048 BASIC METABOLIC PNL TOTAL CA: CPT

## 2024-11-26 PROCEDURE — 85025 COMPLETE CBC W/AUTO DIFF WBC: CPT

## 2024-11-26 PROCEDURE — 85027 COMPLETE CBC AUTOMATED: CPT

## 2024-11-26 PROCEDURE — 93306 TTE W/DOPPLER COMPLETE: CPT

## 2024-11-26 PROCEDURE — 96365 THER/PROPH/DIAG IV INF INIT: CPT

## 2024-11-26 PROCEDURE — 85730 THROMBOPLASTIN TIME PARTIAL: CPT

## 2024-12-03 ENCOUNTER — APPOINTMENT (OUTPATIENT)
Dept: INTERNAL MEDICINE | Facility: CLINIC | Age: 74
End: 2024-12-03
Payer: MEDICARE

## 2024-12-03 VITALS — DIASTOLIC BLOOD PRESSURE: 62 MMHG | SYSTOLIC BLOOD PRESSURE: 108 MMHG

## 2024-12-03 VITALS
SYSTOLIC BLOOD PRESSURE: 110 MMHG | WEIGHT: 315 LBS | HEART RATE: 78 BPM | HEIGHT: 69 IN | OXYGEN SATURATION: 97 % | DIASTOLIC BLOOD PRESSURE: 65 MMHG | BODY MASS INDEX: 46.65 KG/M2

## 2024-12-03 DIAGNOSIS — J30.9 ALLERGIC RHINITIS, UNSPECIFIED: ICD-10-CM

## 2024-12-03 DIAGNOSIS — I10 ESSENTIAL (PRIMARY) HYPERTENSION: ICD-10-CM

## 2024-12-03 DIAGNOSIS — E78.5 HYPERLIPIDEMIA, UNSPECIFIED: ICD-10-CM

## 2024-12-03 DIAGNOSIS — R73.09 OTHER ABNORMAL GLUCOSE: ICD-10-CM

## 2024-12-03 PROCEDURE — 36415 COLL VENOUS BLD VENIPUNCTURE: CPT

## 2024-12-03 PROCEDURE — 99214 OFFICE O/P EST MOD 30 MIN: CPT

## 2024-12-03 PROCEDURE — G2211 COMPLEX E/M VISIT ADD ON: CPT

## 2024-12-03 RX ORDER — AZELASTINE HYDROCHLORIDE 137 UG/1
137 SPRAY, METERED NASAL TWICE DAILY
Qty: 1 | Refills: 1 | Status: ACTIVE | COMMUNITY
Start: 2024-12-03 | End: 1900-01-01

## 2024-12-03 NOTE — ASU PATIENT PROFILE, ADULT - BLOOD AVOIDANCE/RESTRICTIONS, PROFILE
[facility] Procedure Time-Out and Procedure Note      Time-Out Verified By:  QUINCY Cordova and AARON Vizcaino  Patient Identity: Ching Serrano  Procedure and Procedure Site: Excision chin lesion  Consents Signed: Yes  Any anticoagulants, aspirin, antiplatelets, or nonsteroidal anti-inflammatory drugs for the past 5 days?  Yes    Local Anesthesia: 1% Lidocaine with epinephrine  Specimen Size: 0.5 cm  Orientation: Orientation was not marked  Specimen Identified and Labeled: Yes  Specimen sent to lab:   Yes  Specimen Labeled for pathology as: Chin lesion   Time of excision:  1023  Placed in formalin? Yes  Lab order placed:  Yes  Closure Time:  1028  Suture used: 4-0 Monocryl P3, 4-0 Prolene P3            Discussion of Wound/Incision Care with Patient:  Yes                  
(E4) spontaneous
none

## 2024-12-04 LAB
ANION GAP SERPL CALC-SCNC: 14 MMOL/L
BUN SERPL-MCNC: 21 MG/DL
CALCIUM SERPL-MCNC: 10.1 MG/DL
CHLORIDE SERPL-SCNC: 101 MMOL/L
CO2 SERPL-SCNC: 25 MMOL/L
CREAT SERPL-MCNC: 1.75 MG/DL
EGFR: 41 ML/MIN/1.73M2
ESTIMATED AVERAGE GLUCOSE: 111 MG/DL
GLUCOSE SERPL-MCNC: 110 MG/DL
HBA1C MFR BLD HPLC: 5.5 %
POTASSIUM SERPL-SCNC: 4.3 MMOL/L
SODIUM SERPL-SCNC: 141 MMOL/L

## 2024-12-13 ENCOUNTER — APPOINTMENT (OUTPATIENT)
Dept: HEMATOLOGY ONCOLOGY | Facility: CLINIC | Age: 74
End: 2024-12-13
Payer: MEDICARE

## 2024-12-13 ENCOUNTER — RESULT REVIEW (OUTPATIENT)
Age: 74
End: 2024-12-13

## 2024-12-13 VITALS
HEIGHT: 69 IN | WEIGHT: 187 LBS | OXYGEN SATURATION: 97 % | TEMPERATURE: 98.1 F | SYSTOLIC BLOOD PRESSURE: 144 MMHG | BODY MASS INDEX: 27.7 KG/M2 | DIASTOLIC BLOOD PRESSURE: 84 MMHG | HEART RATE: 79 BPM

## 2024-12-13 DIAGNOSIS — N18.9 ACUTE KIDNEY FAILURE, UNSPECIFIED: ICD-10-CM

## 2024-12-13 DIAGNOSIS — C34.90 MALIGNANT NEOPLASM OF UNSPECIFIED PART OF UNSPECIFIED BRONCHUS OR LUNG: ICD-10-CM

## 2024-12-13 DIAGNOSIS — N18.9 CHRONIC KIDNEY DISEASE, UNSPECIFIED: ICD-10-CM

## 2024-12-13 DIAGNOSIS — N17.9 ACUTE KIDNEY FAILURE, UNSPECIFIED: ICD-10-CM

## 2024-12-13 DIAGNOSIS — I26.99 OTHER PULMONARY EMBOLISM W/OUT ACUTE COR PULMONALE: ICD-10-CM

## 2024-12-13 LAB
BASOPHILS # BLD AUTO: 0 K/UL — SIGNIFICANT CHANGE UP (ref 0–0.2)
BASOPHILS NFR BLD AUTO: 0.8 % — SIGNIFICANT CHANGE UP (ref 0–2)
EOSINOPHIL # BLD AUTO: 0.3 K/UL — SIGNIFICANT CHANGE UP (ref 0–0.5)
EOSINOPHIL NFR BLD AUTO: 5.6 % — SIGNIFICANT CHANGE UP (ref 0–6)
HCT VFR BLD CALC: 36.8 % — LOW (ref 39–50)
HGB BLD-MCNC: 12 G/DL — LOW (ref 13–17)
LYMPHOCYTES # BLD AUTO: 0.5 K/UL — LOW (ref 1–3.3)
LYMPHOCYTES # BLD AUTO: 11 % — LOW (ref 13–44)
MCHC RBC-ENTMCNC: 31.7 PG — SIGNIFICANT CHANGE UP (ref 27–34)
MCHC RBC-ENTMCNC: 32.6 G/DL — SIGNIFICANT CHANGE UP (ref 32–36)
MCV RBC AUTO: 97.5 FL — SIGNIFICANT CHANGE UP (ref 80–100)
MONOCYTES # BLD AUTO: 0.4 K/UL — SIGNIFICANT CHANGE UP (ref 0–0.9)
MONOCYTES NFR BLD AUTO: 7.6 % — SIGNIFICANT CHANGE UP (ref 2–14)
NEUTROPHILS # BLD AUTO: 3.7 K/UL — SIGNIFICANT CHANGE UP (ref 1.8–7.4)
NEUTROPHILS NFR BLD AUTO: 74.9 % — SIGNIFICANT CHANGE UP (ref 43–77)
PLATELET # BLD AUTO: 93 K/UL — LOW (ref 150–400)
RBC # BLD: 3.77 M/UL — LOW (ref 4.2–5.8)
RBC # FLD: 13.7 % — SIGNIFICANT CHANGE UP (ref 10.3–14.5)
WBC # BLD: 4.9 K/UL — SIGNIFICANT CHANGE UP (ref 3.8–10.5)
WBC # FLD AUTO: 4.9 K/UL — SIGNIFICANT CHANGE UP (ref 3.8–10.5)

## 2024-12-13 PROCEDURE — G2211 COMPLEX E/M VISIT ADD ON: CPT

## 2024-12-13 PROCEDURE — 99215 OFFICE O/P EST HI 40 MIN: CPT

## 2024-12-13 RX ORDER — APIXABAN 5 MG/1
5 TABLET, FILM COATED ORAL
Qty: 60 | Refills: 3 | Status: ACTIVE | COMMUNITY
Start: 2024-12-13 | End: 1900-01-01

## 2024-12-16 LAB
ALBUMIN SERPL ELPH-MCNC: 4.4 G/DL
ALP BLD-CCNC: 89 U/L
ALT SERPL-CCNC: 26 U/L
ANION GAP SERPL CALC-SCNC: 14 MMOL/L
AST SERPL-CCNC: 26 U/L
BILIRUB SERPL-MCNC: 0.4 MG/DL
BUN SERPL-MCNC: 21 MG/DL
CALCIUM SERPL-MCNC: 10 MG/DL
CEA SERPL-MCNC: 1.1 NG/ML
CHLORIDE SERPL-SCNC: 101 MMOL/L
CO2 SERPL-SCNC: 25 MMOL/L
CREAT SERPL-MCNC: 1.71 MG/DL
EGFR: 42 ML/MIN/1.73M2
GLUCOSE SERPL-MCNC: 108 MG/DL
POTASSIUM SERPL-SCNC: 4.3 MMOL/L
PROT SERPL-MCNC: 7.1 G/DL
SODIUM SERPL-SCNC: 140 MMOL/L

## 2025-01-02 ENCOUNTER — APPOINTMENT (OUTPATIENT)
Dept: RADIATION ONCOLOGY | Facility: CLINIC | Age: 75
End: 2025-01-02
Payer: MEDICARE

## 2025-01-02 VITALS
BODY MASS INDEX: 28.27 KG/M2 | WEIGHT: 191.4 LBS | SYSTOLIC BLOOD PRESSURE: 130 MMHG | RESPIRATION RATE: 16 BRPM | DIASTOLIC BLOOD PRESSURE: 75 MMHG | HEART RATE: 72 BPM | OXYGEN SATURATION: 99 %

## 2025-01-02 DIAGNOSIS — Z92.21 PERSONAL HISTORY OF ANTINEOPLASTIC CHEMOTHERAPY: ICD-10-CM

## 2025-01-02 DIAGNOSIS — Z92.3 PERSONAL HISTORY OF IRRADIATION: ICD-10-CM

## 2025-01-02 DIAGNOSIS — R59.0 LOCALIZED ENLARGED LYMPH NODES: ICD-10-CM

## 2025-01-02 DIAGNOSIS — Z51.12 ENCOUNTER FOR ANTINEOPLASTIC IMMUNOTHERAPY: ICD-10-CM

## 2025-01-02 PROCEDURE — G2211 COMPLEX E/M VISIT ADD ON: CPT

## 2025-01-02 PROCEDURE — 99212 OFFICE O/P EST SF 10 MIN: CPT

## 2025-01-04 PROBLEM — Z92.21 PERSONAL HISTORY OF CHEMOTHERAPY: Status: ACTIVE | Noted: 2025-01-04

## 2025-01-04 PROBLEM — Z92.3 PERSONAL HISTORY OF RADIATION THERAPY: Status: ACTIVE | Noted: 2025-01-04

## 2025-01-04 PROBLEM — Z51.12 MAINTENANCE ANTINEOPLASTIC IMMUNOTHERAPY: Status: ACTIVE | Noted: 2025-01-04

## 2025-01-08 ENCOUNTER — OUTPATIENT (OUTPATIENT)
Dept: OUTPATIENT SERVICES | Facility: HOSPITAL | Age: 75
LOS: 1 days | Discharge: HOME | End: 2025-01-08

## 2025-01-08 DIAGNOSIS — Z98.890 OTHER SPECIFIED POSTPROCEDURAL STATES: Chronic | ICD-10-CM

## 2025-01-08 DIAGNOSIS — D64.9 ANEMIA, UNSPECIFIED: ICD-10-CM

## 2025-01-10 ENCOUNTER — RESULT REVIEW (OUTPATIENT)
Age: 75
End: 2025-01-10

## 2025-01-10 ENCOUNTER — APPOINTMENT (OUTPATIENT)
Dept: HEMATOLOGY ONCOLOGY | Facility: CLINIC | Age: 75
End: 2025-01-10
Payer: MEDICARE

## 2025-01-10 ENCOUNTER — NON-APPOINTMENT (OUTPATIENT)
Age: 75
End: 2025-01-10

## 2025-01-10 VITALS
SYSTOLIC BLOOD PRESSURE: 127 MMHG | HEART RATE: 66 BPM | BODY MASS INDEX: 28.24 KG/M2 | HEIGHT: 69 IN | TEMPERATURE: 98.6 F | WEIGHT: 190.7 LBS | OXYGEN SATURATION: 95 % | DIASTOLIC BLOOD PRESSURE: 81 MMHG

## 2025-01-10 LAB
BASOPHILS # BLD AUTO: 0 K/UL — SIGNIFICANT CHANGE UP (ref 0–0.2)
BASOPHILS NFR BLD AUTO: 0.7 % — SIGNIFICANT CHANGE UP (ref 0–2)
EOSINOPHIL # BLD AUTO: 0.1 K/UL — SIGNIFICANT CHANGE UP (ref 0–0.5)
EOSINOPHIL NFR BLD AUTO: 2.6 % — SIGNIFICANT CHANGE UP (ref 0–6)
HCT VFR BLD CALC: 37.8 % — LOW (ref 39–50)
HGB BLD-MCNC: 12.6 G/DL — LOW (ref 13–17)
LYMPHOCYTES # BLD AUTO: 0.6 K/UL — LOW (ref 1–3.3)
LYMPHOCYTES # BLD AUTO: 11.1 % — LOW (ref 13–44)
MCHC RBC-ENTMCNC: 31.1 PG — SIGNIFICANT CHANGE UP (ref 27–34)
MCHC RBC-ENTMCNC: 33.3 G/DL — SIGNIFICANT CHANGE UP (ref 32–36)
MCV RBC AUTO: 93.3 FL — SIGNIFICANT CHANGE UP (ref 80–100)
MONOCYTES # BLD AUTO: 0.4 K/UL — SIGNIFICANT CHANGE UP (ref 0–0.9)
MONOCYTES NFR BLD AUTO: 8.4 % — SIGNIFICANT CHANGE UP (ref 2–14)
NEUTROPHILS # BLD AUTO: 3.9 K/UL — SIGNIFICANT CHANGE UP (ref 1.8–7.4)
NEUTROPHILS NFR BLD AUTO: 77.3 % — HIGH (ref 43–77)
PLATELET # BLD AUTO: 114 K/UL — LOW (ref 150–400)
RBC # BLD: 4.05 M/UL — LOW (ref 4.2–5.8)
RBC # FLD: 12.9 % — SIGNIFICANT CHANGE UP (ref 10.3–14.5)
WBC # BLD: 5 K/UL — SIGNIFICANT CHANGE UP (ref 3.8–10.5)
WBC # FLD AUTO: 5 K/UL — SIGNIFICANT CHANGE UP (ref 3.8–10.5)

## 2025-01-10 PROCEDURE — 99214 OFFICE O/P EST MOD 30 MIN: CPT

## 2025-01-13 LAB
ALBUMIN SERPL ELPH-MCNC: 4.5 G/DL
ALP BLD-CCNC: 94 U/L
ALT SERPL-CCNC: 22 U/L
ANION GAP SERPL CALC-SCNC: 13 MMOL/L
AST SERPL-CCNC: 27 U/L
BILIRUB SERPL-MCNC: 0.3 MG/DL
BUN SERPL-MCNC: 24 MG/DL
CALCIUM SERPL-MCNC: 9.8 MG/DL
CEA SERPL-MCNC: 1.4 NG/ML
CHLORIDE SERPL-SCNC: 102 MMOL/L
CO2 SERPL-SCNC: 25 MMOL/L
CREAT SERPL-MCNC: 1.81 MG/DL
EGFR: 39 ML/MIN/1.73M2
GLUCOSE SERPL-MCNC: 107 MG/DL
POTASSIUM SERPL-SCNC: 4.2 MMOL/L
PROT SERPL-MCNC: 7 G/DL
SODIUM SERPL-SCNC: 140 MMOL/L

## 2025-01-14 ENCOUNTER — APPOINTMENT (OUTPATIENT)
Age: 75
End: 2025-01-14

## 2025-01-15 ENCOUNTER — APPOINTMENT (OUTPATIENT)
Dept: INTERNAL MEDICINE | Facility: CLINIC | Age: 75
End: 2025-01-15
Payer: MEDICARE

## 2025-01-15 ENCOUNTER — NON-APPOINTMENT (OUTPATIENT)
Age: 75
End: 2025-01-15

## 2025-01-15 VITALS
DIASTOLIC BLOOD PRESSURE: 72 MMHG | OXYGEN SATURATION: 97 % | BODY MASS INDEX: 27.85 KG/M2 | HEART RATE: 69 BPM | SYSTOLIC BLOOD PRESSURE: 120 MMHG | HEIGHT: 69 IN | WEIGHT: 188 LBS

## 2025-01-15 DIAGNOSIS — J06.9 ACUTE UPPER RESPIRATORY INFECTION, UNSPECIFIED: ICD-10-CM

## 2025-01-15 DIAGNOSIS — E78.5 HYPERLIPIDEMIA, UNSPECIFIED: ICD-10-CM

## 2025-01-15 DIAGNOSIS — C34.90 MALIGNANT NEOPLASM OF UNSPECIFIED PART OF UNSPECIFIED BRONCHUS OR LUNG: ICD-10-CM

## 2025-01-15 DIAGNOSIS — J18.9 PNEUMONIA, UNSPECIFIED ORGANISM: ICD-10-CM

## 2025-01-15 DIAGNOSIS — I26.99 OTHER PULMONARY EMBOLISM W/OUT ACUTE COR PULMONALE: ICD-10-CM

## 2025-01-15 DIAGNOSIS — N18.9 ACUTE KIDNEY FAILURE, UNSPECIFIED: ICD-10-CM

## 2025-01-15 DIAGNOSIS — N18.9 CHRONIC KIDNEY DISEASE, UNSPECIFIED: ICD-10-CM

## 2025-01-15 DIAGNOSIS — E86.0 DEHYDRATION: ICD-10-CM

## 2025-01-15 DIAGNOSIS — N17.9 ACUTE KIDNEY FAILURE, UNSPECIFIED: ICD-10-CM

## 2025-01-15 DIAGNOSIS — I10 ESSENTIAL (PRIMARY) HYPERTENSION: ICD-10-CM

## 2025-01-15 PROCEDURE — G2211 COMPLEX E/M VISIT ADD ON: CPT

## 2025-01-15 PROCEDURE — 36415 COLL VENOUS BLD VENIPUNCTURE: CPT

## 2025-01-15 PROCEDURE — 99214 OFFICE O/P EST MOD 30 MIN: CPT

## 2025-01-15 RX ORDER — AZITHROMYCIN 250 MG/1
250 TABLET, FILM COATED ORAL
Qty: 1 | Refills: 0 | Status: ACTIVE | COMMUNITY
Start: 2025-01-15 | End: 1900-01-01

## 2025-01-15 RX ORDER — AMOXICILLIN 500 MG/1
500 TABLET, FILM COATED ORAL EVERY 8 HOURS
Qty: 30 | Refills: 0 | Status: ACTIVE | COMMUNITY
Start: 2025-01-15 | End: 1900-01-01

## 2025-01-16 LAB
ANION GAP SERPL CALC-SCNC: 15 MMOL/L
BUN SERPL-MCNC: 22 MG/DL
CALCIUM SERPL-MCNC: 9.4 MG/DL
CHLORIDE SERPL-SCNC: 102 MMOL/L
CO2 SERPL-SCNC: 24 MMOL/L
CREAT SERPL-MCNC: 1.56 MG/DL
EGFR: 47 ML/MIN/1.73M2
GLUCOSE SERPL-MCNC: 123 MG/DL
POTASSIUM SERPL-SCNC: 4.7 MMOL/L
SODIUM SERPL-SCNC: 141 MMOL/L

## 2025-01-17 ENCOUNTER — NON-APPOINTMENT (OUTPATIENT)
Age: 75
End: 2025-01-17

## 2025-01-17 ENCOUNTER — APPOINTMENT (OUTPATIENT)
Dept: PHYSICAL MEDICINE AND REHAB | Facility: CLINIC | Age: 75
End: 2025-01-17
Payer: MEDICARE

## 2025-01-17 DIAGNOSIS — M79.2 NEURALGIA AND NEURITIS, UNSPECIFIED: ICD-10-CM

## 2025-01-17 DIAGNOSIS — G89.12 ACUTE POST-THORACOTOMY PAIN: ICD-10-CM

## 2025-01-17 PROCEDURE — 99204 OFFICE O/P NEW MOD 45 MIN: CPT

## 2025-02-10 ENCOUNTER — OUTPATIENT (OUTPATIENT)
Dept: OUTPATIENT SERVICES | Facility: HOSPITAL | Age: 75
LOS: 1 days | End: 2025-02-10
Payer: MEDICARE

## 2025-02-10 ENCOUNTER — APPOINTMENT (OUTPATIENT)
Dept: CT IMAGING | Facility: CLINIC | Age: 75
End: 2025-02-10

## 2025-02-10 DIAGNOSIS — Z98.890 OTHER SPECIFIED POSTPROCEDURAL STATES: Chronic | ICD-10-CM

## 2025-02-10 DIAGNOSIS — C34.90 MALIGNANT NEOPLASM OF UNSPECIFIED PART OF UNSPECIFIED BRONCHUS OR LUNG: ICD-10-CM

## 2025-02-10 PROCEDURE — 74177 CT ABD & PELVIS W/CONTRAST: CPT | Mod: 26

## 2025-02-10 PROCEDURE — 71260 CT THORAX DX C+: CPT | Mod: 26

## 2025-02-25 ENCOUNTER — APPOINTMENT (OUTPATIENT)
Dept: HEMATOLOGY ONCOLOGY | Facility: CLINIC | Age: 75
End: 2025-02-25
Payer: MEDICARE

## 2025-02-25 ENCOUNTER — RESULT REVIEW (OUTPATIENT)
Age: 75
End: 2025-02-25

## 2025-02-25 VITALS
OXYGEN SATURATION: 98 % | SYSTOLIC BLOOD PRESSURE: 121 MMHG | BODY MASS INDEX: 28.28 KG/M2 | DIASTOLIC BLOOD PRESSURE: 76 MMHG | TEMPERATURE: 98.2 F | HEIGHT: 69 IN | HEART RATE: 59 BPM | WEIGHT: 190.92 LBS

## 2025-02-25 DIAGNOSIS — C34.90 MALIGNANT NEOPLASM OF UNSPECIFIED PART OF UNSPECIFIED BRONCHUS OR LUNG: ICD-10-CM

## 2025-02-25 DIAGNOSIS — I26.99 OTHER PULMONARY EMBOLISM W/OUT ACUTE COR PULMONALE: ICD-10-CM

## 2025-02-25 LAB
BASOPHILS # BLD AUTO: 0.04 K/UL — SIGNIFICANT CHANGE UP (ref 0–0.2)
BASOPHILS NFR BLD AUTO: 1 % — SIGNIFICANT CHANGE UP (ref 0–2)
EOSINOPHIL # BLD AUTO: 0.15 K/UL — SIGNIFICANT CHANGE UP (ref 0–0.5)
EOSINOPHIL NFR BLD AUTO: 3.9 % — SIGNIFICANT CHANGE UP (ref 0–6)
HCT VFR BLD CALC: 44.2 % — SIGNIFICANT CHANGE UP (ref 39–50)
HGB BLD-MCNC: 14.1 G/DL — SIGNIFICANT CHANGE UP (ref 13–17)
IMM GRANULOCYTES # BLD AUTO: 0.01 K/UL — SIGNIFICANT CHANGE UP (ref 0–0.07)
IMM GRANULOCYTES NFR BLD AUTO: 0.3 % — SIGNIFICANT CHANGE UP (ref 0–0.9)
IMMATURE PLATELET FRACTION #: 9.2 K/UL — SIGNIFICANT CHANGE UP (ref 3.9–12.5)
IMMATURE PLATELET FRACTION %: 10.5 % — HIGH (ref 1.6–7.1)
LYMPHOCYTES # BLD AUTO: 0.58 K/UL — LOW (ref 1–3.3)
LYMPHOCYTES NFR BLD AUTO: 15 % — SIGNIFICANT CHANGE UP (ref 13–44)
MCHC RBC-ENTMCNC: 29.7 PG — SIGNIFICANT CHANGE UP (ref 27–34)
MCHC RBC-ENTMCNC: 31.9 G/DL — LOW (ref 32–36)
MCV RBC AUTO: 93.1 FL — SIGNIFICANT CHANGE UP (ref 80–100)
MONOCYTES # BLD AUTO: 0.32 K/UL — SIGNIFICANT CHANGE UP (ref 0–0.9)
MONOCYTES NFR BLD AUTO: 8.3 % — SIGNIFICANT CHANGE UP (ref 2–14)
NEUTROPHILS # BLD AUTO: 2.77 K/UL — SIGNIFICANT CHANGE UP (ref 1.8–7.4)
NEUTROPHILS NFR BLD AUTO: 71.5 % — SIGNIFICANT CHANGE UP (ref 43–77)
NRBC # BLD AUTO: 0 K/UL — SIGNIFICANT CHANGE UP (ref 0–0)
NRBC BLD AUTO-RTO: 0 /100 WBCS — SIGNIFICANT CHANGE UP (ref 0–0)
PLATELET # BLD AUTO: 88 K/UL — LOW (ref 150–400)
PMV BLD: 12.2 FL — SIGNIFICANT CHANGE UP (ref 7–13)
RBC # BLD: 4.75 M/UL — SIGNIFICANT CHANGE UP (ref 4.2–5.8)
WBC # BLD: 3.87 K/UL — SIGNIFICANT CHANGE UP (ref 3.8–10.5)
WBC # FLD AUTO: 3.87 K/UL — SIGNIFICANT CHANGE UP (ref 3.8–10.5)

## 2025-02-25 PROCEDURE — 99215 OFFICE O/P EST HI 40 MIN: CPT

## 2025-02-26 LAB
ALBUMIN SERPL ELPH-MCNC: 4.4 G/DL
ALP BLD-CCNC: 90 U/L
ALT SERPL-CCNC: 29 U/L
ANION GAP SERPL CALC-SCNC: 14 MMOL/L
AST SERPL-CCNC: 31 U/L
BILIRUB SERPL-MCNC: 0.3 MG/DL
BUN SERPL-MCNC: 27 MG/DL
CALCIUM SERPL-MCNC: 10.2 MG/DL
CEA SERPL-MCNC: 1.1 NG/ML
CHLORIDE SERPL-SCNC: 103 MMOL/L
CO2 SERPL-SCNC: 25 MMOL/L
CREAT SERPL-MCNC: 1.75 MG/DL
EGFR: 41 ML/MIN/1.73M2
GLUCOSE SERPL-MCNC: 111 MG/DL
POTASSIUM SERPL-SCNC: 4.3 MMOL/L
PROT SERPL-MCNC: 7.1 G/DL
SODIUM SERPL-SCNC: 143 MMOL/L

## 2025-04-02 ENCOUNTER — OUTPATIENT (OUTPATIENT)
Dept: OUTPATIENT SERVICES | Facility: HOSPITAL | Age: 75
LOS: 1 days | Discharge: HOME | End: 2025-04-02

## 2025-04-02 DIAGNOSIS — Z98.890 OTHER SPECIFIED POSTPROCEDURAL STATES: Chronic | ICD-10-CM

## 2025-04-02 DIAGNOSIS — D64.9 ANEMIA, UNSPECIFIED: ICD-10-CM

## 2025-04-09 ENCOUNTER — RESULT REVIEW (OUTPATIENT)
Age: 75
End: 2025-04-09

## 2025-04-09 ENCOUNTER — APPOINTMENT (OUTPATIENT)
Dept: HEMATOLOGY ONCOLOGY | Facility: CLINIC | Age: 75
End: 2025-04-09
Payer: MEDICARE

## 2025-04-09 VITALS
TEMPERATURE: 97.7 F | BODY MASS INDEX: 28.29 KG/M2 | WEIGHT: 191 LBS | HEIGHT: 69 IN | OXYGEN SATURATION: 95 % | SYSTOLIC BLOOD PRESSURE: 123 MMHG | DIASTOLIC BLOOD PRESSURE: 70 MMHG | HEART RATE: 64 BPM

## 2025-04-09 LAB
BASOPHILS # BLD AUTO: 0.02 K/UL — SIGNIFICANT CHANGE UP (ref 0–0.2)
BASOPHILS NFR BLD AUTO: 0.5 % — SIGNIFICANT CHANGE UP (ref 0–2)
EOSINOPHIL # BLD AUTO: 0.13 K/UL — SIGNIFICANT CHANGE UP (ref 0–0.5)
EOSINOPHIL NFR BLD AUTO: 3.3 % — SIGNIFICANT CHANGE UP (ref 0–6)
GIANT PLATELETS BLD QL SMEAR: PRESENT
HCT VFR BLD CALC: 41.4 % — SIGNIFICANT CHANGE UP (ref 39–50)
HGB BLD-MCNC: 13.4 G/DL — SIGNIFICANT CHANGE UP (ref 13–17)
IMM GRANULOCYTES # BLD AUTO: 0.01 K/UL — SIGNIFICANT CHANGE UP (ref 0–0.07)
IMM GRANULOCYTES NFR BLD AUTO: 0.3 % — SIGNIFICANT CHANGE UP (ref 0–0.9)
IMMATURE PLATELET FRACTION #: 9 K/UL — SIGNIFICANT CHANGE UP (ref 3.9–12.5)
IMMATURE PLATELET FRACTION %: 12.8 % — HIGH (ref 1.6–7.1)
LG PLATELETS BLD QL AUTO: SLIGHT — SIGNIFICANT CHANGE UP
LYMPHOCYTES # BLD AUTO: 0.54 K/UL — LOW (ref 1–3.3)
LYMPHOCYTES NFR BLD AUTO: 13.6 % — SIGNIFICANT CHANGE UP (ref 13–44)
MANUAL SMEAR VERIFICATION: SIGNIFICANT CHANGE UP
MCHC RBC-ENTMCNC: 29.3 PG — SIGNIFICANT CHANGE UP (ref 27–34)
MCHC RBC-ENTMCNC: 32.4 G/DL — SIGNIFICANT CHANGE UP (ref 32–36)
MCV RBC AUTO: 90.4 FL — SIGNIFICANT CHANGE UP (ref 80–100)
MONOCYTES # BLD AUTO: 0.31 K/UL — SIGNIFICANT CHANGE UP (ref 0–0.9)
MONOCYTES NFR BLD AUTO: 7.8 % — SIGNIFICANT CHANGE UP (ref 2–14)
NEUTROPHILS # BLD AUTO: 2.95 K/UL — SIGNIFICANT CHANGE UP (ref 1.8–7.4)
NEUTROPHILS NFR BLD AUTO: 74.5 % — SIGNIFICANT CHANGE UP (ref 43–77)
NRBC # BLD AUTO: 0 K/UL — SIGNIFICANT CHANGE UP (ref 0–0)
NRBC # FLD: 0 K/UL — SIGNIFICANT CHANGE UP (ref 0–0)
NRBC BLD AUTO-RTO: 0 /100 WBCS — SIGNIFICANT CHANGE UP (ref 0–0)
PLAT MORPH BLD: NORMAL — SIGNIFICANT CHANGE UP
PLATELET # BLD AUTO: 70 K/UL — LOW (ref 150–400)
PMV BLD: 12.2 FL — SIGNIFICANT CHANGE UP (ref 7–13)
RBC # BLD: 4.58 M/UL — SIGNIFICANT CHANGE UP (ref 4.2–5.8)
RBC # FLD: 14.4 % — SIGNIFICANT CHANGE UP (ref 10.3–14.5)
RBC BLD AUTO: SIGNIFICANT CHANGE UP
WBC # BLD: 3.96 K/UL — SIGNIFICANT CHANGE UP (ref 3.8–10.5)
WBC # FLD AUTO: 3.96 K/UL — SIGNIFICANT CHANGE UP (ref 3.8–10.5)

## 2025-04-09 PROCEDURE — 99214 OFFICE O/P EST MOD 30 MIN: CPT

## 2025-04-14 LAB
ALBUMIN SERPL ELPH-MCNC: 4.4 G/DL
ALP BLD-CCNC: 103 U/L
ALT SERPL-CCNC: 26 U/L
ANION GAP SERPL CALC-SCNC: 11 MMOL/L
AST SERPL-CCNC: 23 U/L
BILIRUB SERPL-MCNC: 0.6 MG/DL
BUN SERPL-MCNC: 24 MG/DL
CALCIUM SERPL-MCNC: 10.1 MG/DL
CEA SERPL-MCNC: 1.1 NG/ML
CHLORIDE SERPL-SCNC: 101 MMOL/L
CO2 SERPL-SCNC: 28 MMOL/L
CREAT SERPL-MCNC: 1.69 MG/DL
EGFRCR SERPLBLD CKD-EPI 2021: 42 ML/MIN/1.73M2
GLUCOSE SERPL-MCNC: 153 MG/DL
POTASSIUM SERPL-SCNC: 4.1 MMOL/L
PROT SERPL-MCNC: 6.8 G/DL
SODIUM SERPL-SCNC: 141 MMOL/L

## 2025-04-17 ENCOUNTER — APPOINTMENT (OUTPATIENT)
Dept: INTERNAL MEDICINE | Facility: CLINIC | Age: 75
End: 2025-04-17
Payer: MEDICARE

## 2025-04-17 ENCOUNTER — LABORATORY RESULT (OUTPATIENT)
Age: 75
End: 2025-04-17

## 2025-04-17 VITALS
HEART RATE: 70 BPM | BODY MASS INDEX: 28.14 KG/M2 | WEIGHT: 190 LBS | OXYGEN SATURATION: 97 % | HEIGHT: 69 IN | DIASTOLIC BLOOD PRESSURE: 60 MMHG | SYSTOLIC BLOOD PRESSURE: 128 MMHG

## 2025-04-17 DIAGNOSIS — E78.5 HYPERLIPIDEMIA, UNSPECIFIED: ICD-10-CM

## 2025-04-17 DIAGNOSIS — Z00.00 ENCOUNTER FOR GENERAL ADULT MEDICAL EXAMINATION W/OUT ABNORMAL FINDINGS: ICD-10-CM

## 2025-04-17 DIAGNOSIS — C34.90 MALIGNANT NEOPLASM OF UNSPECIFIED PART OF UNSPECIFIED BRONCHUS OR LUNG: ICD-10-CM

## 2025-04-17 DIAGNOSIS — I26.99 OTHER PULMONARY EMBOLISM W/OUT ACUTE COR PULMONALE: ICD-10-CM

## 2025-04-17 DIAGNOSIS — N18.9 CHRONIC KIDNEY DISEASE, UNSPECIFIED: ICD-10-CM

## 2025-04-17 DIAGNOSIS — I10 ESSENTIAL (PRIMARY) HYPERTENSION: ICD-10-CM

## 2025-04-17 DIAGNOSIS — R73.09 OTHER ABNORMAL GLUCOSE: ICD-10-CM

## 2025-04-17 PROCEDURE — G0439: CPT

## 2025-04-17 PROCEDURE — 36415 COLL VENOUS BLD VENIPUNCTURE: CPT

## 2025-04-18 LAB
ALBUMIN SERPL ELPH-MCNC: 4.4 G/DL
ALP BLD-CCNC: 94 U/L
ALT SERPL-CCNC: 27 U/L
ANION GAP SERPL CALC-SCNC: 10 MMOL/L
AST SERPL-CCNC: 28 U/L
BASOPHILS # BLD AUTO: 0.02 K/UL
BASOPHILS NFR BLD AUTO: 0.5 %
BILIRUB SERPL-MCNC: 0.6 MG/DL
BUN SERPL-MCNC: 22 MG/DL
CALCIUM SERPL-MCNC: 9.8 MG/DL
CHLORIDE SERPL-SCNC: 101 MMOL/L
CHOLEST SERPL-MCNC: 148 MG/DL
CO2 SERPL-SCNC: 28 MMOL/L
CREAT SERPL-MCNC: 1.72 MG/DL
EGFRCR SERPLBLD CKD-EPI 2021: 41 ML/MIN/1.73M2
EOSINOPHIL # BLD AUTO: 0.12 K/UL
EOSINOPHIL NFR BLD AUTO: 2.7 %
ESTIMATED AVERAGE GLUCOSE: 117 MG/DL
GLUCOSE SERPL-MCNC: 96 MG/DL
HBA1C MFR BLD HPLC: 5.7 %
HCT VFR BLD CALC: 41.8 %
HDLC SERPL-MCNC: 37 MG/DL
HGB BLD-MCNC: 13.8 G/DL
IMM GRANULOCYTES NFR BLD AUTO: 0.2 %
LDLC SERPL-MCNC: 76 MG/DL
LYMPHOCYTES # BLD AUTO: 0.5 K/UL
LYMPHOCYTES NFR BLD AUTO: 11.4 %
MAN DIFF?: NORMAL
MCHC RBC-ENTMCNC: 30.1 PG
MCHC RBC-ENTMCNC: 33 G/DL
MCV RBC AUTO: 91.3 FL
MONOCYTES # BLD AUTO: 0.37 K/UL
MONOCYTES NFR BLD AUTO: 8.5 %
NEUTROPHILS # BLD AUTO: 3.35 K/UL
NEUTROPHILS NFR BLD AUTO: 76.7 %
NONHDLC SERPL-MCNC: 111 MG/DL
PLATELET # BLD AUTO: 66 K/UL
POTASSIUM SERPL-SCNC: 4.4 MMOL/L
PROT SERPL-MCNC: 6.8 G/DL
RBC # BLD: 4.58 M/UL
RBC # FLD: 15.5 %
SODIUM SERPL-SCNC: 138 MMOL/L
TRIGL SERPL-MCNC: 209 MG/DL
WBC # FLD AUTO: 4.37 K/UL

## 2025-04-29 ENCOUNTER — RESULT REVIEW (OUTPATIENT)
Age: 75
End: 2025-04-29

## 2025-04-29 ENCOUNTER — APPOINTMENT (OUTPATIENT)
Dept: HEMATOLOGY ONCOLOGY | Facility: CLINIC | Age: 75
End: 2025-04-29

## 2025-04-29 LAB
BASOPHILS # BLD AUTO: 0.02 K/UL — SIGNIFICANT CHANGE UP (ref 0–0.2)
BASOPHILS NFR BLD AUTO: 0.4 % — SIGNIFICANT CHANGE UP (ref 0–2)
EOSINOPHIL # BLD AUTO: 0.2 K/UL — SIGNIFICANT CHANGE UP (ref 0–0.5)
EOSINOPHIL NFR BLD AUTO: 4.2 % — SIGNIFICANT CHANGE UP (ref 0–6)
HCT VFR BLD CALC: 41.2 % — SIGNIFICANT CHANGE UP (ref 39–50)
HGB BLD-MCNC: 13.4 G/DL — SIGNIFICANT CHANGE UP (ref 13–17)
IMM GRANULOCYTES # BLD AUTO: 0.01 K/UL — SIGNIFICANT CHANGE UP (ref 0–0.07)
IMM GRANULOCYTES NFR BLD AUTO: 0.2 % — SIGNIFICANT CHANGE UP (ref 0–0.9)
IMMATURE PLATELET FRACTION #: 8.4 K/UL — SIGNIFICANT CHANGE UP (ref 3.9–12.5)
IMMATURE PLATELET FRACTION %: 11.7 % — HIGH (ref 1.6–7.1)
LYMPHOCYTES # BLD AUTO: 0.62 K/UL — LOW (ref 1–3.3)
LYMPHOCYTES NFR BLD AUTO: 13.1 % — SIGNIFICANT CHANGE UP (ref 13–44)
MACROCYTES BLD QL: SLIGHT — SIGNIFICANT CHANGE UP
MANUAL SMEAR VERIFICATION: SIGNIFICANT CHANGE UP
MCHC RBC-ENTMCNC: 29.5 PG — SIGNIFICANT CHANGE UP (ref 27–34)
MCHC RBC-ENTMCNC: 32.5 G/DL — SIGNIFICANT CHANGE UP (ref 32–36)
MCV RBC AUTO: 90.5 FL — SIGNIFICANT CHANGE UP (ref 80–100)
MONOCYTES # BLD AUTO: 0.38 K/UL — SIGNIFICANT CHANGE UP (ref 0–0.9)
MONOCYTES NFR BLD AUTO: 8.1 % — SIGNIFICANT CHANGE UP (ref 2–14)
NEUTROPHILS # BLD AUTO: 3.49 K/UL — SIGNIFICANT CHANGE UP (ref 1.8–7.4)
NEUTROPHILS NFR BLD AUTO: 74 % — SIGNIFICANT CHANGE UP (ref 43–77)
NRBC # BLD AUTO: 0 K/UL — SIGNIFICANT CHANGE UP (ref 0–0)
NRBC # FLD: 0 K/UL — SIGNIFICANT CHANGE UP (ref 0–0)
NRBC BLD AUTO-RTO: 0 /100 WBCS — SIGNIFICANT CHANGE UP (ref 0–0)
PLAT MORPH BLD: NORMAL — SIGNIFICANT CHANGE UP
PLATELET # BLD AUTO: 72 K/UL — LOW (ref 150–400)
PMV BLD: 12.7 FL — SIGNIFICANT CHANGE UP (ref 7–13)
RBC # BLD: 4.55 M/UL — SIGNIFICANT CHANGE UP (ref 4.2–5.8)
RBC # FLD: 14.3 % — SIGNIFICANT CHANGE UP (ref 10.3–14.5)
RBC BLD AUTO: SIGNIFICANT CHANGE UP
WBC # BLD: 4.72 K/UL — SIGNIFICANT CHANGE UP (ref 3.8–10.5)
WBC # FLD AUTO: 4.72 K/UL — SIGNIFICANT CHANGE UP (ref 3.8–10.5)

## 2025-04-30 LAB
ALBUMIN SERPL ELPH-MCNC: 4.4 G/DL
ALP BLD-CCNC: 86 U/L
ALT SERPL-CCNC: 31 U/L
ANION GAP SERPL CALC-SCNC: 13 MMOL/L
AST SERPL-CCNC: 26 U/L
BILIRUB SERPL-MCNC: 0.5 MG/DL
BUN SERPL-MCNC: 22 MG/DL
CALCIUM SERPL-MCNC: 9.6 MG/DL
CHLORIDE SERPL-SCNC: 100 MMOL/L
CO2 SERPL-SCNC: 25 MMOL/L
CREAT SERPL-MCNC: 1.75 MG/DL
EGFRCR SERPLBLD CKD-EPI 2021: 41 ML/MIN/1.73M2
GLUCOSE SERPL-MCNC: 98 MG/DL
POTASSIUM SERPL-SCNC: 4.4 MMOL/L
PROT SERPL-MCNC: 6.6 G/DL
SODIUM SERPL-SCNC: 138 MMOL/L

## 2025-05-11 ENCOUNTER — OUTPATIENT (OUTPATIENT)
Dept: OUTPATIENT SERVICES | Facility: HOSPITAL | Age: 75
LOS: 1 days | End: 2025-05-11
Payer: MEDICARE

## 2025-05-11 DIAGNOSIS — Z98.890 OTHER SPECIFIED POSTPROCEDURAL STATES: Chronic | ICD-10-CM

## 2025-05-11 DIAGNOSIS — C34.90 MALIGNANT NEOPLASM OF UNSPECIFIED PART OF UNSPECIFIED BRONCHUS OR LUNG: ICD-10-CM

## 2025-05-11 PROCEDURE — A9585: CPT

## 2025-05-11 PROCEDURE — 70553 MRI BRAIN STEM W/O & W/DYE: CPT

## 2025-05-12 ENCOUNTER — OUTPATIENT (OUTPATIENT)
Dept: OUTPATIENT SERVICES | Facility: HOSPITAL | Age: 75
LOS: 1 days | End: 2025-05-12

## 2025-05-12 ENCOUNTER — APPOINTMENT (OUTPATIENT)
Dept: CT IMAGING | Facility: CLINIC | Age: 75
End: 2025-05-12
Payer: MEDICARE

## 2025-05-12 DIAGNOSIS — Z98.890 OTHER SPECIFIED POSTPROCEDURAL STATES: Chronic | ICD-10-CM

## 2025-05-12 DIAGNOSIS — C34.90 MALIGNANT NEOPLASM OF UNSPECIFIED PART OF UNSPECIFIED BRONCHUS OR LUNG: ICD-10-CM

## 2025-05-12 PROCEDURE — 74177 CT ABD & PELVIS W/CONTRAST: CPT | Mod: 26

## 2025-05-12 PROCEDURE — 71260 CT THORAX DX C+: CPT | Mod: 26

## 2025-05-14 ENCOUNTER — RESULT REVIEW (OUTPATIENT)
Age: 75
End: 2025-05-14

## 2025-05-14 ENCOUNTER — APPOINTMENT (OUTPATIENT)
Dept: HEMATOLOGY ONCOLOGY | Facility: CLINIC | Age: 75
End: 2025-05-14

## 2025-05-14 LAB
ANISOCYTOSIS BLD QL: SLIGHT — SIGNIFICANT CHANGE UP
BASOPHILS # BLD AUTO: 0.03 K/UL — SIGNIFICANT CHANGE UP (ref 0–0.2)
BASOPHILS NFR BLD AUTO: 0.8 % — SIGNIFICANT CHANGE UP (ref 0–2)
EOSINOPHIL # BLD AUTO: 0.21 K/UL — SIGNIFICANT CHANGE UP (ref 0–0.5)
EOSINOPHIL NFR BLD AUTO: 5.4 % — SIGNIFICANT CHANGE UP (ref 0–6)
HCT VFR BLD CALC: 40.5 % — SIGNIFICANT CHANGE UP (ref 39–50)
HGB BLD-MCNC: 13.3 G/DL — SIGNIFICANT CHANGE UP (ref 13–17)
IMM GRANULOCYTES # BLD AUTO: 0.01 K/UL — SIGNIFICANT CHANGE UP (ref 0–0.07)
IMM GRANULOCYTES NFR BLD AUTO: 0.3 % — SIGNIFICANT CHANGE UP (ref 0–0.9)
IMMATURE PLATELET FRACTION #: 9.4 K/UL — SIGNIFICANT CHANGE UP (ref 3.9–12.5)
IMMATURE PLATELET FRACTION %: 9.3 % — HIGH (ref 1.6–7.1)
LG PLATELETS BLD QL AUTO: SLIGHT — SIGNIFICANT CHANGE UP
LYMPHOCYTES # BLD AUTO: 0.63 K/UL — LOW (ref 1–3.3)
LYMPHOCYTES NFR BLD AUTO: 16.2 % — SIGNIFICANT CHANGE UP (ref 13–44)
MACROCYTES BLD QL: SLIGHT — SIGNIFICANT CHANGE UP
MANUAL SMEAR VERIFICATION: SIGNIFICANT CHANGE UP
MCHC RBC-ENTMCNC: 29.6 PG — SIGNIFICANT CHANGE UP (ref 27–34)
MCHC RBC-ENTMCNC: 32.8 G/DL — SIGNIFICANT CHANGE UP (ref 32–36)
MCV RBC AUTO: 90 FL — SIGNIFICANT CHANGE UP (ref 80–100)
MICROCYTES BLD QL: SLIGHT — SIGNIFICANT CHANGE UP
MONOCYTES # BLD AUTO: 0.38 K/UL — SIGNIFICANT CHANGE UP (ref 0–0.9)
MONOCYTES NFR BLD AUTO: 9.8 % — SIGNIFICANT CHANGE UP (ref 2–14)
NEUTROPHILS # BLD AUTO: 2.63 K/UL — SIGNIFICANT CHANGE UP (ref 1.8–7.4)
NEUTROPHILS NFR BLD AUTO: 67.5 % — SIGNIFICANT CHANGE UP (ref 43–77)
NRBC # BLD AUTO: 0 K/UL — SIGNIFICANT CHANGE UP (ref 0–0)
NRBC # FLD: 0 K/UL — SIGNIFICANT CHANGE UP (ref 0–0)
NRBC BLD AUTO-RTO: 0 /100 WBCS — SIGNIFICANT CHANGE UP (ref 0–0)
PLAT MORPH BLD: NORMAL — SIGNIFICANT CHANGE UP
PLATELET # BLD AUTO: 92 K/UL — LOW (ref 150–400)
PMV BLD: 12.3 FL — SIGNIFICANT CHANGE UP (ref 7–13)
RBC # BLD: 4.5 M/UL — SIGNIFICANT CHANGE UP (ref 4.2–5.8)
RBC # FLD: 14.3 % — SIGNIFICANT CHANGE UP (ref 10.3–14.5)
RBC BLD AUTO: SIGNIFICANT CHANGE UP
WBC # BLD: 3.89 K/UL — SIGNIFICANT CHANGE UP (ref 3.8–10.5)
WBC # FLD AUTO: 3.89 K/UL — SIGNIFICANT CHANGE UP (ref 3.8–10.5)

## 2025-05-21 ENCOUNTER — APPOINTMENT (OUTPATIENT)
Dept: HEMATOLOGY ONCOLOGY | Facility: CLINIC | Age: 75
End: 2025-05-21

## 2025-05-21 ENCOUNTER — RESULT REVIEW (OUTPATIENT)
Age: 75
End: 2025-05-21

## 2025-05-21 LAB
BASOPHILS # BLD AUTO: 0.03 K/UL — SIGNIFICANT CHANGE UP (ref 0–0.2)
BASOPHILS NFR BLD AUTO: 0.6 % — SIGNIFICANT CHANGE UP (ref 0–2)
EOSINOPHIL # BLD AUTO: 0.22 K/UL — SIGNIFICANT CHANGE UP (ref 0–0.5)
EOSINOPHIL NFR BLD AUTO: 4.5 % — SIGNIFICANT CHANGE UP (ref 0–6)
HCT VFR BLD CALC: 40.2 % — SIGNIFICANT CHANGE UP (ref 39–50)
HGB BLD-MCNC: 13.2 G/DL — SIGNIFICANT CHANGE UP (ref 13–17)
IMM GRANULOCYTES # BLD AUTO: 0.02 K/UL — SIGNIFICANT CHANGE UP (ref 0–0.07)
IMM GRANULOCYTES NFR BLD AUTO: 0.4 % — SIGNIFICANT CHANGE UP (ref 0–0.9)
LYMPHOCYTES # BLD AUTO: 0.66 K/UL — LOW (ref 1–3.3)
LYMPHOCYTES NFR BLD AUTO: 13.4 % — SIGNIFICANT CHANGE UP (ref 13–44)
MCHC RBC-ENTMCNC: 30.1 PG — SIGNIFICANT CHANGE UP (ref 27–34)
MCHC RBC-ENTMCNC: 32.8 G/DL — SIGNIFICANT CHANGE UP (ref 32–36)
MCV RBC AUTO: 91.6 FL — SIGNIFICANT CHANGE UP (ref 80–100)
MONOCYTES # BLD AUTO: 0.43 K/UL — SIGNIFICANT CHANGE UP (ref 0–0.9)
MONOCYTES NFR BLD AUTO: 8.7 % — SIGNIFICANT CHANGE UP (ref 2–14)
NEUTROPHILS # BLD AUTO: 3.58 K/UL — SIGNIFICANT CHANGE UP (ref 1.8–7.4)
NEUTROPHILS NFR BLD AUTO: 72.4 % — SIGNIFICANT CHANGE UP (ref 43–77)
NRBC # BLD AUTO: 0 K/UL — SIGNIFICANT CHANGE UP (ref 0–0)
NRBC # FLD: 0 K/UL — SIGNIFICANT CHANGE UP (ref 0–0)
NRBC BLD AUTO-RTO: 0 /100 WBCS — SIGNIFICANT CHANGE UP (ref 0–0)
PLATELET # BLD AUTO: 106 K/UL — LOW (ref 150–400)
PMV BLD: 11.6 FL — SIGNIFICANT CHANGE UP (ref 7–13)
RBC # BLD: 4.39 M/UL — SIGNIFICANT CHANGE UP (ref 4.2–5.8)
RBC # FLD: 14.4 % — SIGNIFICANT CHANGE UP (ref 10.3–14.5)
WBC # BLD: 4.94 K/UL — SIGNIFICANT CHANGE UP (ref 3.8–10.5)
WBC # FLD AUTO: 4.94 K/UL — SIGNIFICANT CHANGE UP (ref 3.8–10.5)

## 2025-05-22 ENCOUNTER — NON-APPOINTMENT (OUTPATIENT)
Age: 75
End: 2025-05-22

## 2025-05-29 NOTE — PLAN
Brief Postoperative Note      Patient: Cleo Rodriguez  YOB: 1958  MRN: [id number]    Date of Procedure: 5/29/2025    Pre-Op Diagnosis Codes:      * Spinal stenosis of lumbar region, unspecified whether neurogenic claudication present [M48.061]     * Ruptured lumbar disc [M51.26]    Post-Op Diagnosis: Same       Procedure(s):  L2 - L3 LAMINECTOMY, LEFT FACETECTOMY AND DISCECTOMY, L2 - L5 REVSISON OF FUSION (O-ARM)    Surgeon(s):  Sadiq Moya MD    Assistant:  Surgical Assistant: Bayron Reddy    Anesthesia: General    Estimated Blood Loss (mL): 200     Complications: None    Specimens:   * No specimens in log *    Implants:  Implant Name Type Inv. Item Serial No.  Lot No. LRB No. Used Action   GRAFT BNE SUB L CANC FRZN MORSELIZED W/ VIABLE CELL NGOZI - [id number]  GRAFT BNE SUB L CANC FRZN MORSELIZED W/ VIABLE CELL NGOZI [id number] [facility] [facility] N[facility]A 1 Implanted   GRAFT BNE SUB L CANC FRZN MORSELIZED W/ VIABLE CELL NGOZI - [id number]  GRAFT BNE SUB L CANC FRZN MORSELIZED W/ VIABLE CELL NGOZI [id number] [employer]PL[employer] [facility] N/A N/A 1 Implanted   SET SCR SPNL L6MM DIA5.5MM TI BRK OFF KRISTIN W/ DETACH [facility] - SNA  SET SCR SPNL L6MM DIA5.5MM TI BRK OFF KRISTIN W/ DETACH [facility] NA [employer]-WD NA N/A 8 Implanted   SCREW SPNL L50MM DIA5.5MM POST THORACOLUMBOSACRAL CO CHROM - SNA  SCREW SPNL L50MM DIA5.5MM POST THORACOLUMBOSACRAL CO CHROM NA [employer] DANE[employer]-WD NA N/A 3 Implanted   SCREW SPNL L45MM DIA5.5MM POST THORACOLUMBOSACRAL CO CHROM - SNA  SCREW SPNL L45MM DIA5.5MM POST THORACOLUMBOSACRAL CO CHROM NA [employer]ANEK-WD NA N/A 1 Implanted   MISHEL SPNL L70MM DIA5.5MM THOR LUMBER TI CRV [facility] SOLERA - SNA  MISHEL SPNL L70MM DIA5.5MM THOR LUMBER TI CRV [facility] SOLERA NA [employer] DANEK-WD NA N/A 1 Implanted   MISHEL SPNL L80MM DIA5.5MM ANT POST THORACOLUMBOSACRAL TI - SNA  IMSHEL SPNL L80MM 
[FreeTextEntry1] : pt needs colonoscopy and is up to date with pneumo vac discussed shingles vac

## 2025-05-30 ENCOUNTER — OUTPATIENT (OUTPATIENT)
Dept: OUTPATIENT SERVICES | Facility: HOSPITAL | Age: 75
LOS: 1 days | Discharge: HOME | End: 2025-05-30

## 2025-05-30 DIAGNOSIS — D64.9 ANEMIA, UNSPECIFIED: ICD-10-CM

## 2025-05-30 DIAGNOSIS — Z98.890 OTHER SPECIFIED POSTPROCEDURAL STATES: Chronic | ICD-10-CM

## 2025-06-03 ENCOUNTER — APPOINTMENT (OUTPATIENT)
Dept: HEMATOLOGY ONCOLOGY | Facility: CLINIC | Age: 75
End: 2025-06-03

## 2025-06-03 ENCOUNTER — RESULT REVIEW (OUTPATIENT)
Age: 75
End: 2025-06-03

## 2025-06-03 LAB
BASOPHILS # BLD AUTO: 0.04 K/UL — SIGNIFICANT CHANGE UP (ref 0–0.2)
BASOPHILS NFR BLD AUTO: 0.9 % — SIGNIFICANT CHANGE UP (ref 0–2)
EOSINOPHIL # BLD AUTO: 0.29 K/UL — SIGNIFICANT CHANGE UP (ref 0–0.5)
EOSINOPHIL NFR BLD AUTO: 6.3 % — HIGH (ref 0–6)
HCT VFR BLD CALC: 40 % — SIGNIFICANT CHANGE UP (ref 39–50)
HGB BLD-MCNC: 13 G/DL — SIGNIFICANT CHANGE UP (ref 13–17)
IMM GRANULOCYTES # BLD AUTO: 0.02 K/UL — SIGNIFICANT CHANGE UP (ref 0–0.07)
IMM GRANULOCYTES NFR BLD AUTO: 0.4 % — SIGNIFICANT CHANGE UP (ref 0–0.9)
IMMATURE PLATELET FRACTION #: 8 K/UL — SIGNIFICANT CHANGE UP (ref 3.9–12.5)
IMMATURE PLATELET FRACTION %: 9.1 % — HIGH (ref 1.6–7.1)
LYMPHOCYTES # BLD AUTO: 0.63 K/UL — LOW (ref 1–3.3)
LYMPHOCYTES NFR BLD AUTO: 13.6 % — SIGNIFICANT CHANGE UP (ref 13–44)
MCHC RBC-ENTMCNC: 29.5 PG — SIGNIFICANT CHANGE UP (ref 27–34)
MCHC RBC-ENTMCNC: 32.5 G/DL — SIGNIFICANT CHANGE UP (ref 32–36)
MCV RBC AUTO: 90.7 FL — SIGNIFICANT CHANGE UP (ref 80–100)
MONOCYTES # BLD AUTO: 0.4 K/UL — SIGNIFICANT CHANGE UP (ref 0–0.9)
MONOCYTES NFR BLD AUTO: 8.7 % — SIGNIFICANT CHANGE UP (ref 2–14)
NEUTROPHILS # BLD AUTO: 3.24 K/UL — SIGNIFICANT CHANGE UP (ref 1.8–7.4)
NEUTROPHILS NFR BLD AUTO: 70.1 % — SIGNIFICANT CHANGE UP (ref 43–77)
NRBC # BLD AUTO: 0 K/UL — SIGNIFICANT CHANGE UP (ref 0–0)
NRBC # FLD: 0 K/UL — SIGNIFICANT CHANGE UP (ref 0–0)
NRBC BLD AUTO-RTO: 0 /100 WBCS — SIGNIFICANT CHANGE UP (ref 0–0)
PLATELET # BLD AUTO: 88 K/UL — LOW (ref 150–400)
PMV BLD: 11.8 FL — SIGNIFICANT CHANGE UP (ref 7–13)
RBC # BLD: 4.41 M/UL — SIGNIFICANT CHANGE UP (ref 4.2–5.8)
RBC # FLD: 14.1 % — SIGNIFICANT CHANGE UP (ref 10.3–14.5)
WBC # BLD: 4.62 K/UL — SIGNIFICANT CHANGE UP (ref 3.8–10.5)
WBC # FLD AUTO: 4.62 K/UL — SIGNIFICANT CHANGE UP (ref 3.8–10.5)

## 2025-06-04 LAB
ALBUMIN SERPL ELPH-MCNC: 4.5 G/DL
ALP BLD-CCNC: 104 U/L
ALT SERPL-CCNC: 36 U/L
ANION GAP SERPL CALC-SCNC: 12 MMOL/L
AST SERPL-CCNC: 26 U/L
BILIRUB SERPL-MCNC: 0.5 MG/DL
BUN SERPL-MCNC: 23 MG/DL
CALCIUM SERPL-MCNC: 10.1 MG/DL
CHLORIDE SERPL-SCNC: 103 MMOL/L
CO2 SERPL-SCNC: 25 MMOL/L
CREAT SERPL-MCNC: 1.73 MG/DL
EGFRCR SERPLBLD CKD-EPI 2021: 41 ML/MIN/1.73M2
GLUCOSE SERPL-MCNC: 120 MG/DL
POTASSIUM SERPL-SCNC: 4.4 MMOL/L
PROT SERPL-MCNC: 6.7 G/DL
SODIUM SERPL-SCNC: 140 MMOL/L

## 2025-06-05 ENCOUNTER — APPOINTMENT (OUTPATIENT)
Dept: RADIATION ONCOLOGY | Facility: CLINIC | Age: 75
End: 2025-06-05
Payer: MEDICARE

## 2025-06-05 VITALS
WEIGHT: 19 LBS | BODY MASS INDEX: 2.81 KG/M2 | OXYGEN SATURATION: 98 % | HEART RATE: 72 BPM | RESPIRATION RATE: 16 BRPM | DIASTOLIC BLOOD PRESSURE: 76 MMHG | SYSTOLIC BLOOD PRESSURE: 128 MMHG | HEIGHT: 69 IN

## 2025-06-05 PROCEDURE — 99212 OFFICE O/P EST SF 10 MIN: CPT

## 2025-06-17 ENCOUNTER — APPOINTMENT (OUTPATIENT)
Dept: HEMATOLOGY ONCOLOGY | Facility: CLINIC | Age: 75
End: 2025-06-17
Payer: MEDICARE

## 2025-06-17 ENCOUNTER — RESULT REVIEW (OUTPATIENT)
Age: 75
End: 2025-06-17

## 2025-06-17 VITALS
OXYGEN SATURATION: 96 % | WEIGHT: 201.5 LBS | HEIGHT: 69 IN | BODY MASS INDEX: 29.84 KG/M2 | HEART RATE: 60 BPM | SYSTOLIC BLOOD PRESSURE: 122 MMHG | TEMPERATURE: 98.4 F | DIASTOLIC BLOOD PRESSURE: 74 MMHG

## 2025-06-17 LAB
BASOPHILS # BLD AUTO: 0.03 K/UL — SIGNIFICANT CHANGE UP (ref 0–0.2)
BASOPHILS NFR BLD AUTO: 0.7 % — SIGNIFICANT CHANGE UP (ref 0–2)
EOSINOPHIL # BLD AUTO: 0.32 K/UL — SIGNIFICANT CHANGE UP (ref 0–0.5)
EOSINOPHIL NFR BLD AUTO: 7 % — HIGH (ref 0–6)
HCT VFR BLD CALC: 41.2 % — SIGNIFICANT CHANGE UP (ref 39–50)
HGB BLD-MCNC: 13.5 G/DL — SIGNIFICANT CHANGE UP (ref 13–17)
IMM GRANULOCYTES # BLD AUTO: 0.01 K/UL — SIGNIFICANT CHANGE UP (ref 0–0.07)
IMM GRANULOCYTES NFR BLD AUTO: 0.2 % — SIGNIFICANT CHANGE UP (ref 0–0.9)
IMMATURE PLATELET FRACTION #: 9.9 K/UL — SIGNIFICANT CHANGE UP (ref 3.9–12.5)
IMMATURE PLATELET FRACTION %: 11.6 % — HIGH (ref 1.6–7.1)
LYMPHOCYTES # BLD AUTO: 0.7 K/UL — LOW (ref 1–3.3)
LYMPHOCYTES NFR BLD AUTO: 15.4 % — SIGNIFICANT CHANGE UP (ref 13–44)
MCHC RBC-ENTMCNC: 30 PG — SIGNIFICANT CHANGE UP (ref 27–34)
MCHC RBC-ENTMCNC: 32.8 G/DL — SIGNIFICANT CHANGE UP (ref 32–36)
MCV RBC AUTO: 91.6 FL — SIGNIFICANT CHANGE UP (ref 80–100)
MONOCYTES # BLD AUTO: 0.33 K/UL — SIGNIFICANT CHANGE UP (ref 0–0.9)
MONOCYTES NFR BLD AUTO: 7.2 % — SIGNIFICANT CHANGE UP (ref 2–14)
NEUTROPHILS # BLD AUTO: 3.17 K/UL — SIGNIFICANT CHANGE UP (ref 1.8–7.4)
NEUTROPHILS NFR BLD AUTO: 69.5 % — SIGNIFICANT CHANGE UP (ref 43–77)
NRBC # BLD AUTO: 0 K/UL — SIGNIFICANT CHANGE UP (ref 0–0)
NRBC # FLD: 0 K/UL — SIGNIFICANT CHANGE UP (ref 0–0)
NRBC BLD AUTO-RTO: 0 /100 WBCS — SIGNIFICANT CHANGE UP (ref 0–0)
PLATELET # BLD AUTO: 85 K/UL — LOW (ref 150–400)
PMV BLD: 12.4 FL — SIGNIFICANT CHANGE UP (ref 7–13)
RBC # BLD: 4.5 M/UL — SIGNIFICANT CHANGE UP (ref 4.2–5.8)
RBC # FLD: 14.1 % — SIGNIFICANT CHANGE UP (ref 10.3–14.5)
WBC # BLD: 4.56 K/UL — SIGNIFICANT CHANGE UP (ref 3.8–10.5)
WBC # FLD AUTO: 4.56 K/UL — SIGNIFICANT CHANGE UP (ref 3.8–10.5)

## 2025-06-17 PROCEDURE — 99215 OFFICE O/P EST HI 40 MIN: CPT

## 2025-06-19 LAB
ALBUMIN SERPL ELPH-MCNC: 4.3 G/DL
ALP BLD-CCNC: 105 U/L
ALT SERPL-CCNC: 30 U/L
ANION GAP SERPL CALC-SCNC: 11 MMOL/L
AST SERPL-CCNC: 30 U/L
BILIRUB SERPL-MCNC: 0.5 MG/DL
BUN SERPL-MCNC: 25 MG/DL
CALCIUM SERPL-MCNC: 10.2 MG/DL
CEA SERPL-MCNC: 1.1 NG/ML
CHLORIDE SERPL-SCNC: 102 MMOL/L
CO2 SERPL-SCNC: 26 MMOL/L
CREAT SERPL-MCNC: 1.69 MG/DL
EGFRCR SERPLBLD CKD-EPI 2021: 42 ML/MIN/1.73M2
GLUCOSE SERPL-MCNC: 123 MG/DL
POTASSIUM SERPL-SCNC: 4.6 MMOL/L
PROT SERPL-MCNC: 6.7 G/DL
SODIUM SERPL-SCNC: 139 MMOL/L

## 2025-07-23 ENCOUNTER — RESULT REVIEW (OUTPATIENT)
Age: 75
End: 2025-07-23

## 2025-07-31 ENCOUNTER — RESULT REVIEW (OUTPATIENT)
Age: 75
End: 2025-07-31

## 2025-07-31 ENCOUNTER — APPOINTMENT (OUTPATIENT)
Dept: HEMATOLOGY ONCOLOGY | Facility: CLINIC | Age: 75
End: 2025-07-31

## 2025-08-04 LAB
ALBUMIN SERPL ELPH-MCNC: 4.3 G/DL
ALP BLD-CCNC: 92 U/L
ALT SERPL-CCNC: 29 U/L
ANION GAP SERPL CALC-SCNC: 11 MMOL/L
AST SERPL-CCNC: 26 U/L
BILIRUB SERPL-MCNC: 0.5 MG/DL
BUN SERPL-MCNC: 23 MG/DL
CALCIUM SERPL-MCNC: 9.6 MG/DL
CEA SERPL-MCNC: 1.3 NG/ML
CHLORIDE SERPL-SCNC: 104 MMOL/L
CO2 SERPL-SCNC: 25 MMOL/L
CREAT SERPL-MCNC: 1.66 MG/DL
EGFRCR SERPLBLD CKD-EPI 2021: 43 ML/MIN/1.73M2
GLUCOSE SERPL-MCNC: 118 MG/DL
POTASSIUM SERPL-SCNC: 4.1 MMOL/L
PROT SERPL-MCNC: 6.6 G/DL
SODIUM SERPL-SCNC: 141 MMOL/L

## 2025-08-08 ENCOUNTER — OUTPATIENT (OUTPATIENT)
Dept: OUTPATIENT SERVICES | Facility: HOSPITAL | Age: 75
LOS: 1 days | End: 2025-08-08

## 2025-08-08 ENCOUNTER — APPOINTMENT (OUTPATIENT)
Dept: CT IMAGING | Facility: CLINIC | Age: 75
End: 2025-08-08

## 2025-08-08 DIAGNOSIS — Z98.890 OTHER SPECIFIED POSTPROCEDURAL STATES: Chronic | ICD-10-CM

## 2025-08-08 DIAGNOSIS — C34.90 MALIGNANT NEOPLASM OF UNSPECIFIED PART OF UNSPECIFIED BRONCHUS OR LUNG: ICD-10-CM

## 2025-08-08 PROCEDURE — 74177 CT ABD & PELVIS W/CONTRAST: CPT | Mod: 26

## 2025-08-08 PROCEDURE — 71260 CT THORAX DX C+: CPT | Mod: 26

## 2025-08-13 ENCOUNTER — APPOINTMENT (OUTPATIENT)
Dept: INTERNAL MEDICINE | Facility: CLINIC | Age: 75
End: 2025-08-13
Payer: MEDICARE

## 2025-08-13 VITALS — BODY MASS INDEX: 28.8 KG/M2 | HEART RATE: 60 BPM | OXYGEN SATURATION: 98 % | WEIGHT: 195 LBS

## 2025-08-13 VITALS — DIASTOLIC BLOOD PRESSURE: 70 MMHG | SYSTOLIC BLOOD PRESSURE: 128 MMHG

## 2025-08-13 DIAGNOSIS — E78.5 HYPERLIPIDEMIA, UNSPECIFIED: ICD-10-CM

## 2025-08-13 DIAGNOSIS — I10 ESSENTIAL (PRIMARY) HYPERTENSION: ICD-10-CM

## 2025-08-13 DIAGNOSIS — R91.1 SOLITARY PULMONARY NODULE: ICD-10-CM

## 2025-08-13 DIAGNOSIS — N18.9 CHRONIC KIDNEY DISEASE, UNSPECIFIED: ICD-10-CM

## 2025-08-13 PROCEDURE — 99214 OFFICE O/P EST MOD 30 MIN: CPT

## 2025-08-13 PROCEDURE — G2211 COMPLEX E/M VISIT ADD ON: CPT

## 2025-08-14 ENCOUNTER — RESULT REVIEW (OUTPATIENT)
Age: 75
End: 2025-08-14

## 2025-08-14 ENCOUNTER — APPOINTMENT (OUTPATIENT)
Dept: HEMATOLOGY ONCOLOGY | Facility: CLINIC | Age: 75
End: 2025-08-14

## 2025-08-15 LAB
ALBUMIN SERPL ELPH-MCNC: 4.4 G/DL
ALP BLD-CCNC: 97 U/L
ALT SERPL-CCNC: 31 U/L
ANION GAP SERPL CALC-SCNC: 12 MMOL/L
AST SERPL-CCNC: 31 U/L
BILIRUB SERPL-MCNC: 0.5 MG/DL
BUN SERPL-MCNC: 24 MG/DL
CALCIUM SERPL-MCNC: 9.6 MG/DL
CEA SERPL-MCNC: 1.4 NG/ML
CHLORIDE SERPL-SCNC: 103 MMOL/L
CO2 SERPL-SCNC: 25 MMOL/L
CREAT SERPL-MCNC: 1.66 MG/DL
EGFRCR SERPLBLD CKD-EPI 2021: 43 ML/MIN/1.73M2
GLUCOSE SERPL-MCNC: 123 MG/DL
POTASSIUM SERPL-SCNC: 3.9 MMOL/L
PROT SERPL-MCNC: 6.9 G/DL
SODIUM SERPL-SCNC: 139 MMOL/L

## 2025-08-20 ENCOUNTER — APPOINTMENT (OUTPATIENT)
Dept: HEMATOLOGY ONCOLOGY | Facility: CLINIC | Age: 75
End: 2025-08-20
Payer: MEDICARE

## 2025-08-20 VITALS
OXYGEN SATURATION: 96 % | HEART RATE: 56 BPM | SYSTOLIC BLOOD PRESSURE: 123 MMHG | WEIGHT: 199.01 LBS | HEIGHT: 69 IN | DIASTOLIC BLOOD PRESSURE: 75 MMHG | BODY MASS INDEX: 29.48 KG/M2

## 2025-08-20 DIAGNOSIS — C34.90 MALIGNANT NEOPLASM OF UNSPECIFIED PART OF UNSPECIFIED BRONCHUS OR LUNG: ICD-10-CM

## 2025-08-20 DIAGNOSIS — I26.99 OTHER PULMONARY EMBOLISM W/OUT ACUTE COR PULMONALE: ICD-10-CM

## 2025-08-20 PROCEDURE — 99215 OFFICE O/P EST HI 40 MIN: CPT

## 2025-08-20 PROCEDURE — G2211 COMPLEX E/M VISIT ADD ON: CPT

## 2025-08-29 ENCOUNTER — APPOINTMENT (OUTPATIENT)
Dept: HEMATOLOGY ONCOLOGY | Facility: CLINIC | Age: 75
End: 2025-08-29

## 2025-08-29 ENCOUNTER — RESULT REVIEW (OUTPATIENT)
Age: 75
End: 2025-08-29

## 2025-09-02 LAB
ALBUMIN SERPL ELPH-MCNC: 4.5 G/DL
ALP BLD-CCNC: 85 U/L
ALT SERPL-CCNC: 34 U/L
ANION GAP SERPL CALC-SCNC: 10 MMOL/L
AST SERPL-CCNC: 71 U/L
BILIRUB SERPL-MCNC: 0.6 MG/DL
BUN SERPL-MCNC: 25 MG/DL
CALCIUM SERPL-MCNC: 9.5 MG/DL
CHLORIDE SERPL-SCNC: 102 MMOL/L
CO2 SERPL-SCNC: 27 MMOL/L
CREAT SERPL-MCNC: 1.74 MG/DL
EGFRCR SERPLBLD CKD-EPI 2021: 41 ML/MIN/1.73M2
GLUCOSE SERPL-MCNC: 109 MG/DL
POTASSIUM SERPL-SCNC: 5.3 MMOL/L
PROT SERPL-MCNC: 7.1 G/DL
SODIUM SERPL-SCNC: 139 MMOL/L

## 2025-09-11 ENCOUNTER — APPOINTMENT (OUTPATIENT)
Dept: HEMATOLOGY ONCOLOGY | Facility: CLINIC | Age: 75
End: 2025-09-11

## 2025-09-11 ENCOUNTER — RESULT REVIEW (OUTPATIENT)
Age: 75
End: 2025-09-11

## 2025-09-12 LAB
ALBUMIN SERPL ELPH-MCNC: 4.7 G/DL
ALP BLD-CCNC: 76 U/L
ALT SERPL-CCNC: 39 U/L
ANION GAP SERPL CALC-SCNC: 11 MMOL/L
AST SERPL-CCNC: 33 U/L
BILIRUB SERPL-MCNC: 0.7 MG/DL
BUN SERPL-MCNC: 24 MG/DL
CALCIUM SERPL-MCNC: 9.9 MG/DL
CEA SERPL-MCNC: 1.2 NG/ML
CHLORIDE SERPL-SCNC: 98 MMOL/L
CO2 SERPL-SCNC: 27 MMOL/L
CREAT SERPL-MCNC: 1.75 MG/DL
EGFRCR SERPLBLD CKD-EPI 2021: 40 ML/MIN/1.73M2
GLUCOSE SERPL-MCNC: 102 MG/DL
POTASSIUM SERPL-SCNC: 4.3 MMOL/L
PROT SERPL-MCNC: 7 G/DL
SODIUM SERPL-SCNC: 136 MMOL/L

## (undated) DEVICE — SUT SILK 2-0 30" SH

## (undated) DEVICE — STAPLER COVIDIEN ENDO GIA STANDARD HANDLE

## (undated) DEVICE — XI ARM GRASPER TIP UP FENESTRATED

## (undated) DEVICE — XI OBTURATOR OPTICAL BLADELESS 8MM

## (undated) DEVICE — ELCTR GROUNDING PAD ADULT COVIDIEN

## (undated) DEVICE — SOL IRR POUR NS 0.9% 500ML

## (undated) DEVICE — DRSG GAUZE PACKTNER ROLL

## (undated) DEVICE — GRASPER LAPA 5MMX35CM

## (undated) DEVICE — NDL HYPO SAFE 18G X 1.5" (PINK)

## (undated) DEVICE — BLADE SURGICAL #10 STAINLESS

## (undated) DEVICE — DRSG CURITY GAUZE SPONGE 4 X 4" 12-PLY

## (undated) DEVICE — VALVE BIOPSY BRONCHOVIDEOSCOPE

## (undated) DEVICE — DRSG TEGADERM 2.5X3"

## (undated) DEVICE — ADAPTER FIBEROPTIC BRONCHOSCOPE DUAL AXIS SWIVEL

## (undated) DEVICE — GOWN XL

## (undated) DEVICE — SUT VICRYL 0 27" UR-6

## (undated) DEVICE — APPLICATOR FOR PROGEL EXTENDED SPRAY TIP 29CM

## (undated) DEVICE — NDL HYPO REGULAR BEVEL 22G X 1.5" (TURQUOISE)

## (undated) DEVICE — TUBING STRYKEFLOW II SUCTION / IRRIGATOR

## (undated) DEVICE — D HELP - CLEARVIEW CLEARIFY SYSTEM

## (undated) DEVICE — VENODYNE/SCD SLEEVE CALF MEDIUM

## (undated) DEVICE — PACK ROBOTIC LIJ

## (undated) DEVICE — SYR SLIP 10CC

## (undated) DEVICE — LUBRICANT INST ELECTROLUBE Z SOLUTION

## (undated) DEVICE — WARMING BLANKET LOWER ADULT

## (undated) DEVICE — XI SEAL UNIVERSIAL 5-12MM

## (undated) DEVICE — ELCTR BOVIE PENCIL SMOKE EVACUATION

## (undated) DEVICE — XI STAPLER ENDOWRIST 45

## (undated) DEVICE — POSITIONER STRAP ARMBOARD VELCRO TS-30

## (undated) DEVICE — XI DRAPE COLUMN

## (undated) DEVICE — POSITIONER FOAM HEAD CRADLE (PINK)

## (undated) DEVICE — DURABLE MEDICAL EQUIPMENT: Type: DURABLE MEDICAL EQUIPMENT

## (undated) DEVICE — XI STAPLER ENDOWRIST 30 CURVED TIP

## (undated) DEVICE — VALVE SUCTION EVIS 160/200/240

## (undated) DEVICE — XI DRAPE ARM

## (undated) DEVICE — NDL ASPIRATION VIZISHOT2 21G

## (undated) DEVICE — ELCTR BOVIE TIP BLADE INSULATED 6.5" EDGE

## (undated) DEVICE — ENDOCATCH GENERAL 10MM (PURPLE)

## (undated) DEVICE — SUT PROLENE 0 30" CT-1

## (undated) DEVICE — XI ENDOWRIST STAPLER SHEATH

## (undated) DEVICE — BALLOON SINGLE FOR BF-UC160F

## (undated) DEVICE — XI ARM FORCEP FENESTRATED BIPOLAR 8MM

## (undated) DEVICE — SUT MONOCRYL 4-0 27" PS-2 UNDYED

## (undated) DEVICE — NDL ASPIRATION 22G W SYR

## (undated) DEVICE — TUBING SUCTION NONCONDUCTIVE 6MM X 12FT

## (undated) DEVICE — XI ARM GRASPER BIPOLAR LONG 8MM

## (undated) DEVICE — TUBING OLYMPUS INSUFFLATION

## (undated) DEVICE — TRAP SPECIMEN SPUTUM 40CC

## (undated) DEVICE — DRAPE 3/4 SHEET 52X76"

## (undated) DEVICE — DRSG TELFA 3 X 8

## (undated) DEVICE — ELCTR BOVIE TIP BLADE INSULATED 2.75" EDGE